# Patient Record
Sex: FEMALE | Race: WHITE | NOT HISPANIC OR LATINO | Employment: FULL TIME | URBAN - METROPOLITAN AREA
[De-identification: names, ages, dates, MRNs, and addresses within clinical notes are randomized per-mention and may not be internally consistent; named-entity substitution may affect disease eponyms.]

---

## 2017-03-10 ENCOUNTER — ALLSCRIPTS OFFICE VISIT (OUTPATIENT)
Dept: OTHER | Facility: OTHER | Age: 52
End: 2017-03-10

## 2017-04-28 ENCOUNTER — ALLSCRIPTS OFFICE VISIT (OUTPATIENT)
Dept: OTHER | Facility: OTHER | Age: 52
End: 2017-04-28

## 2017-04-28 DIAGNOSIS — E78.5 HYPERLIPIDEMIA: ICD-10-CM

## 2017-04-28 DIAGNOSIS — R53.83 OTHER FATIGUE: ICD-10-CM

## 2017-04-28 DIAGNOSIS — M75.21 BICIPITAL TENDINITIS OF RIGHT SHOULDER: ICD-10-CM

## 2017-04-28 DIAGNOSIS — Z12.31 ENCOUNTER FOR SCREENING MAMMOGRAM FOR MALIGNANT NEOPLASM OF BREAST: ICD-10-CM

## 2017-05-03 ENCOUNTER — LAB CONVERSION - ENCOUNTER (OUTPATIENT)
Dept: OTHER | Facility: OTHER | Age: 52
End: 2017-05-03

## 2017-05-03 LAB
A/G RATIO (HISTORICAL): 2.3 (CALC) (ref 1–2.5)
ALBUMIN SERPL BCP-MCNC: 4.4 G/DL (ref 3.6–5.1)
ALP SERPL-CCNC: 74 U/L (ref 33–130)
ALT SERPL W P-5'-P-CCNC: 12 U/L (ref 6–29)
AST SERPL W P-5'-P-CCNC: 12 U/L (ref 10–35)
BILIRUB SERPL-MCNC: 0.5 MG/DL (ref 0.2–1.2)
BUN SERPL-MCNC: 18 MG/DL (ref 7–25)
BUN/CREA RATIO (HISTORICAL): ABNORMAL (CALC) (ref 6–22)
CALCIUM SERPL-MCNC: 9.2 MG/DL (ref 8.6–10.4)
CHLORIDE SERPL-SCNC: 104 MMOL/L (ref 98–110)
CHOLEST SERPL-MCNC: 204 MG/DL (ref 125–200)
CHOLEST/HDLC SERPL: 3.5 (CALC)
CO2 SERPL-SCNC: 26 MMOL/L (ref 20–31)
CREAT SERPL-MCNC: 0.68 MG/DL (ref 0.5–1.05)
DEPRECATED RDW RBC AUTO: 13.9 % (ref 11–15)
EGFR AFRICAN AMERICAN (HISTORICAL): 117 ML/MIN/1.73M2
EGFR-AMERICAN CALC (HISTORICAL): 101 ML/MIN/1.73M2
GAMMA GLOBULIN (HISTORICAL): 1.9 G/DL (CALC) (ref 1.9–3.7)
GLUCOSE (HISTORICAL): 106 MG/DL (ref 65–99)
HCT VFR BLD AUTO: 41 % (ref 35–45)
HDLC SERPL-MCNC: 58 MG/DL
HGB BLD-MCNC: 13.8 G/DL (ref 11.7–15.5)
LDL CHOLESTEROL (HISTORICAL): 123 MG/DL (CALC)
MCH RBC QN AUTO: 31.3 PG (ref 27–33)
MCHC RBC AUTO-ENTMCNC: 33.5 G/DL (ref 32–36)
MCV RBC AUTO: 93.3 FL (ref 80–100)
NON-HDL-CHOL (CHOL-HDL) (HISTORICAL): 146 MG/DL (CALC)
PLATELET # BLD AUTO: 230 THOUSAND/UL (ref 140–400)
PMV BLD AUTO: 8.8 FL (ref 7.5–12.5)
POTASSIUM SERPL-SCNC: 4 MMOL/L (ref 3.5–5.3)
RBC # BLD AUTO: 4.39 MILLION/UL (ref 3.8–5.1)
SODIUM SERPL-SCNC: 138 MMOL/L (ref 135–146)
TOTAL PROTEIN (HISTORICAL): 6.3 G/DL (ref 6.1–8.1)
TRIGL SERPL-MCNC: 117 MG/DL
TSH SERPL DL<=0.05 MIU/L-ACNC: 2.35 MIU/L
WBC # BLD AUTO: 8 THOUSAND/UL (ref 3.8–10.8)

## 2017-05-15 ENCOUNTER — ALLSCRIPTS OFFICE VISIT (OUTPATIENT)
Dept: OTHER | Facility: OTHER | Age: 52
End: 2017-05-15

## 2017-05-22 ENCOUNTER — ALLSCRIPTS OFFICE VISIT (OUTPATIENT)
Dept: OTHER | Facility: OTHER | Age: 52
End: 2017-05-22

## 2017-08-28 ENCOUNTER — GENERIC CONVERSION - ENCOUNTER (OUTPATIENT)
Dept: OTHER | Facility: OTHER | Age: 52
End: 2017-08-28

## 2017-10-09 ENCOUNTER — ALLSCRIPTS OFFICE VISIT (OUTPATIENT)
Dept: OTHER | Facility: OTHER | Age: 52
End: 2017-10-09

## 2017-10-09 DIAGNOSIS — M75.21 BICIPITAL TENDINITIS OF RIGHT SHOULDER: ICD-10-CM

## 2017-10-10 NOTE — PROGRESS NOTES
Assessment  1  Biceps tendinitis of right shoulder (726 12) (M75 21)   2  Right rotator cuff tendinitis (726 10) (M75 81)    Plan  Biceps tendinitis of right shoulder    · *1 - [facility]  *  Status: Active  Requested for: [id number]  Care Summary provided  : Yes    Chief Complaint  1  Shoulder Pain    Discussion/Summary    Left shoulder pain suspected biceps tendinitis or rotator cuff tendinitisInjection given again into the subacromial space today versus the biceps tendon last visit  Referral to physical therapy  She has not been able to complete therapy so were going to try that again  If she continues to have pain despite having therapy and injection when she comes back in 6 weeks we will look at getting an MRI  History of Present Illness  Patient comes in with regards to her left shoulder  She was seen back in May  She was given a cortisone injection this help  She was unable to attend physical therapy due to family issues  She comes in today she reports the pain is in the from the shoulder its most exquisite when she crosses her arms and tries to reach for opposite side her hip  Her pain is like it was before  Active Problems  1  Acute bronchitis (466 0) (J20 9)   2  Arm skin lesion, right (709 9) (L98 9)   3  Arthralgia of hip, right (719 45) (M25 551)   4  Back Pain   5  Biceps tendinitis of right shoulder (726 12) (M75 21)   6  Biceps tendinitis on left (726 12) (M75 22)   7  Biliary dyskinesia (575 8) (K82 8)   8  Depression with anxiety (300 4) (F41 8)   9  Dermatitis (692 9) (L30 9)   10  Dyslipidemia (272 4) (E78 5)   11  Encounter for screening mammogram for breast cancer (V76 12) (Z12 31)   12  Fall (E888 9) (W19 XXXA)   13  Fatigue (780 79) (R53 83)   14  Gastritis (535 50) (K29 70)   15  Hot flashes, menopausal (627 2) (N95 1)   16  Impingement syndrome of left shoulder (726 2) (M75 42)   17  Insomnia (780 52) (G47 00)   18  Keloid of skin (701 4) (L91 0)   19  Laceration (879 8)   20  Lipoma (214 9) (D17 9)   21  Meralgia paresthetica of right side (355 1) (G57 11)   22  Neck pain (723 1) (M54 2)   23  Neck strain (847 0) (S16 1XXA)   24  Onychomycosis of toenail (110 1) (B35 1)   25  Pain in joint of right shoulder region (719 41) (M25 511)   26  Pharyngitis (462) (J02 9)   27  Rash (782 1) (R21)   28  Shoulder pain (719 41) (M25 519)   29  Sprain of neck (847 0) (S13 9XXA)   30  Status post fall (V15 88) (Z91 81)   31  Tendonitis of wrist, left (727 05) (M77 8)   32  TMJ arthralgia (524 62) (M26 629)   33  Upper respiratory infection (465 9) (J06 9)    Past Medical History   · History of headache (V13 89) (Z87 898)    Family History  Sister    · Family history of seizures (V19 8) (Z84 89)   · Family history of systemic lupus erythematosus (V19 4) (Z82 69)    Social History   · Current every day smoker (305 1) (F17 200)   · 3/4 PPD x25+ years   · Employed   · at [employer]   ·    · No alcohol use   · No illicit drug use    Current Meds   1  PARoxetine HCl ER 12 5 MG Oral Tablet Extended Release 24 Hour; Take 1 tablet daily; Therapy: 51LPB8420 to (Last Rx:15Qva2445)  Requested for: 07Jco9274 Ordered    Allergies  1  Iodine Crystals    Vitals   Recorded: 52NOM3711 12:10PM   Heart Rate 71   Systolic 733   Diastolic 79   Height 5 ft 2 in   Weight 134 lb 2 08 oz   BMI Calculated 24 53   BSA Calculated 1 61     Physical Exam  Shows deficits in flexion and abduction  She has pain with empty can she has pain with speed's and Neer's  She has pain over the biceps tendon  Cross-arm adduction also causes pain  Range of motion      Procedure    Procedure: Injection of the right  Indication:  inflammation  Potential complications include bleeding  Risk were discussed with the patient  Verbal consent was obtained prior to the procedure  Alcohol and betadine was used to prep the area  ethyl chloride spray was used as a topical anesthetic   Was used to inject 1 mL of 1% Lidocaine,-1 mL 0 25% Bupivacaine-and-1 mL of 6mg/mL betamethasone  A bandage was applied  the patient tolerated the procedure well  Complications: none        Signatures   Electronically signed by : Lauren Wetzel DO; Oct  9 2017 12:36PM EST                       (Author)

## 2018-01-09 NOTE — RESULT NOTES
Message   can you please let pt know that her throat culture was negative for strep?  thank you     Verified Results  (1) THROAT CULTURE (CULTURE, UPPER RESPIRATORY) [id number] 08:31PM Serafin Tiburcio     Test Name Result Flag Reference   CLINICAL REPORT (Report)     Test:        Throat culture  Specimen Type:   Throat  Specimen Date:   8/9/2016 8:31 PM  Result Date:    8/11/2016 9:08 AM  Result Status:   Final result  Resulting Lab:   [facility] [address] [place] [address]            Tel: [phone]      CULTURE                                       ------------------                                   Negative for beta-hemolytic Streptococcus

## 2018-01-13 VITALS
BODY MASS INDEX: 24.93 KG/M2 | DIASTOLIC BLOOD PRESSURE: 70 MMHG | TEMPERATURE: 96.2 F | WEIGHT: 135.5 LBS | HEIGHT: 62 IN | SYSTOLIC BLOOD PRESSURE: 110 MMHG | HEART RATE: 80 BPM | RESPIRATION RATE: 14 BRPM

## 2018-01-13 VITALS
BODY MASS INDEX: 24.68 KG/M2 | SYSTOLIC BLOOD PRESSURE: 123 MMHG | DIASTOLIC BLOOD PRESSURE: 79 MMHG | HEIGHT: 62 IN | WEIGHT: 134.13 LBS | HEART RATE: 71 BPM

## 2018-01-14 VITALS
BODY MASS INDEX: 25.12 KG/M2 | DIASTOLIC BLOOD PRESSURE: 68 MMHG | HEIGHT: 62 IN | TEMPERATURE: 98.1 F | WEIGHT: 136.5 LBS | SYSTOLIC BLOOD PRESSURE: 106 MMHG | RESPIRATION RATE: 14 BRPM | HEART RATE: 76 BPM

## 2018-01-14 VITALS
BODY MASS INDEX: 25.12 KG/M2 | DIASTOLIC BLOOD PRESSURE: 88 MMHG | WEIGHT: 136.5 LBS | HEART RATE: 109 BPM | HEIGHT: 62 IN | SYSTOLIC BLOOD PRESSURE: 142 MMHG
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
## 2018-01-22 VITALS
TEMPERATURE: 97.3 F | SYSTOLIC BLOOD PRESSURE: 112 MMHG | DIASTOLIC BLOOD PRESSURE: 66 MMHG | HEART RATE: 78 BPM | RESPIRATION RATE: 14 BRPM

## 2018-02-21 ENCOUNTER — OFFICE VISIT (OUTPATIENT)
Dept: FAMILY MEDICINE CLINIC | Facility: CLINIC | Age: 53
End: 2018-02-21
Payer: COMMERCIAL

## 2018-02-21 VITALS
SYSTOLIC BLOOD PRESSURE: 122 MMHG | WEIGHT: 139.2 LBS | HEIGHT: 62 IN | TEMPERATURE: 98.8 F | DIASTOLIC BLOOD PRESSURE: 78 MMHG | RESPIRATION RATE: 16 BRPM | HEART RATE: 88 BPM | BODY MASS INDEX: 25.62 KG/M2

## 2018-02-21 DIAGNOSIS — J11.1 INFLUENZA: Primary | ICD-10-CM

## 2018-02-21 PROCEDURE — 99213 OFFICE O/P EST LOW 20 MIN: CPT | Performed by: NURSE PRACTITIONER

## 2018-02-21 RX ORDER — OSELTAMIVIR PHOSPHATE 75 MG/1
75 CAPSULE ORAL EVERY 12 HOURS SCHEDULED
Qty: 10 CAPSULE | Refills: 0 | Status: SHIPPED | OUTPATIENT
Start: 2018-02-21 | End: 2018-02-26

## 2018-02-21 NOTE — LETTER
February 21, 2018     Patient: Yohannes Ruiz   YOB: 1965   Date of Visit: 2/21/2018       To Whom it May Concern:    Betzy Cheek is under my professional care  She was seen in my office on 2/21/2018  She may return to work on 2/23/2018  If you have any questions or concerns, please don't hesitate to call           Sincerely,          HARESH Pete        CC: No Recipients

## 2018-02-21 NOTE — PROGRESS NOTES
FAMILY PRACTICE OFFICE VISIT       NAME: Jeana Arredondo  AGE: 46 y o  SEX: female       : 1965        MRN: [id number]    DATE: 2018  TIME: 6:43 PM    Assessment and Plan     Problem List Items Addressed This Visit     None      Visit Diagnoses     Influenza    -  Primary    Relevant Medications    oseltamivir (TAMIFLU) 75 mg capsule            There are no Patient Instructions on file for this visit  1  Influenza  oseltamivir (TAMIFLU) 75 mg capsule     Patient presents today with symptoms concerning for influenza  No rapid flu tests available in office today  Will treat based on symptoms with Tamiflu 75 mg twice daily for 5 days  Reviewed usual course of illness and prevention of spread of illness  Note provided for work  Continue supportive care:  Rest, increase fluids, warm fluids, honey, and humidification  May use Tylenol or ibuprofen as needed  If symptoms worsen, or if symptoms do not improve over the next few days, instructed to call the office  Chief Complaint     Chief Complaint   Patient presents with    Generalized Body Aches     Pt is states she has had the sx's times two weeks     Nasal Congestion    Cough    Chills       History of Present Illness     Patient presents today with body aches, sneezing, nasal congestion, cough, and sore throat for the past 2 days  She does feel feverish with chills and hot flashes, but has not taken her temperature  Denies chest pain, shortness of breath, or chest tightness  She does feel she has chest congestion  Denies any nausea or vomiting  Coworkers have been sick as well  Cough   Associated symptoms include chills, a fever, myalgias, postnasal drip, rhinorrhea and a sore throat  Pertinent negatives include no chest pain, ear pain, headaches, rash, shortness of breath or wheezing  Review of Systems   Review of Systems   Constitutional: Positive for appetite change ( decreased), chills, fatigue and fever     HENT: Positive for congestion (decreased), postnasal drip, rhinorrhea, sinus pressure, sneezing and sore throat  Negative for ear pain  Respiratory: Positive for cough  Negative for chest tightness, shortness of breath and wheezing  Cardiovascular: Negative for chest pain  Gastrointestinal: Negative for diarrhea, nausea and vomiting  Musculoskeletal: Positive for myalgias  Skin: Negative for rash  Neurological: Negative for dizziness and headaches  Active Problem List     Patient Active Problem List   Diagnosis    Biliary dyskinesia    Depression with anxiety    Dyslipidemia    Insomnia    TMJ arthralgia       Past Medical History:  Past Medical History:   Diagnosis Date    Headache        Past Surgical History:  No past surgical history on file  Family History:  Family History   Problem Relation Age of Onset    Seizures Sister     Lupus Sister        Social History:  Social History     Social History    Marital status: /Civil Union     Spouse name: N/A    Number of children: N/A    Years of education: N/A     Occupational History     [employer]     Social History Main Topics    Smoking status: Current Every Day Smoker     Packs/day: 0 50     Years: 25 00    Smokeless tobacco: Never Used    Alcohol use No    Drug use: No    Sexual activity: Not on file     Other Topics Concern    Not on file     Social History Narrative    No narrative on file       I have reviewed the patient's medical history in detail; there are no changes to the history as noted in the electronic medical record      Objective     Vitals:    02/21/18 1401   BP: 122/78   BP Location: Left arm   Patient Position: Sitting   Cuff Size: Adult   Pulse: 88   Resp: 16   Temp: 98 8 °F (37 1 °C)   TempSrc: Tympanic   Weight: 63 1 kg (139 lb 3 2 oz)   Height: 5' 2" (1 575 m)     Wt Readings from Last 3 Encounters:   02/21/18 63 1 kg (139 lb 3 2 oz)   10/09/17 60 8 kg (134 lb 2 1 oz)   05/22/17 61 5 kg (135 lb 8 oz) Body mass index is 25 46 kg/m²  Physical Exam   Constitutional: She appears well-developed and well-nourished  HENT:   Head: Normocephalic and atraumatic  Right Ear: Tympanic membrane and ear canal normal    Left Ear: Tympanic membrane and ear canal normal    Nose: Mucosal edema and rhinorrhea present  Mouth/Throat: Posterior oropharyngeal erythema present  No oropharyngeal exudate  Posterior oropharyngeal edema: Postnasal drip  Eyes: Conjunctivae are normal    Neck: Normal range of motion  Neck supple  Cardiovascular: Normal rate, regular rhythm and normal heart sounds  No murmur heard  Pulmonary/Chest: Effort normal and breath sounds normal    Musculoskeletal: She exhibits no edema  Lymphadenopathy:     She has cervical adenopathy (Bilateral submandibular adenopathy, left greater than right )  Psychiatric: She has a normal mood and affect  Nursing note and vitals reviewed  ALLERGIES:  Allergies   Allergen Reactions    Iodine Edema     Reaction Date: [id number];        Current Medications     Current Outpatient Prescriptions   Medication Sig Dispense Refill    PARoxetine (PAXIL-CR) 12 5 mg 24 hr tablet Take 1 tablet by mouth daily      oseltamivir (TAMIFLU) 75 mg capsule Take 1 capsule (75 mg total) by mouth every 12 (twelve) hours for 5 days 10 capsule 0     No current facility-administered medications for this visit  Health Maintenance   There are no preventive care reminders to display for this patient  There is no immunization history on file for this patient      Sonia Nieto

## 2018-03-29 DIAGNOSIS — F32.A DEPRESSION, UNSPECIFIED DEPRESSION TYPE: Primary | ICD-10-CM

## 2018-03-30 RX ORDER — PAROXETINE HYDROCHLORIDE 12.5 MG/1
12.5 TABLET, FILM COATED, EXTENDED RELEASE ORAL DAILY
Qty: 90 TABLET | Refills: 1 | Status: SHIPPED | OUTPATIENT
Start: 2018-03-30 | End: 2018-09-20 | Stop reason: SDUPTHER

## 2018-06-27 ENCOUNTER — OFFICE VISIT (OUTPATIENT)
Dept: FAMILY MEDICINE CLINIC | Facility: CLINIC | Age: 53
End: 2018-06-27
Payer: COMMERCIAL

## 2018-06-27 VITALS
TEMPERATURE: 96.9 F | RESPIRATION RATE: 20 BRPM | BODY MASS INDEX: 24.69 KG/M2 | HEIGHT: 62 IN | SYSTOLIC BLOOD PRESSURE: 110 MMHG | DIASTOLIC BLOOD PRESSURE: 78 MMHG | WEIGHT: 134.2 LBS | HEART RATE: 80 BPM

## 2018-06-27 DIAGNOSIS — J06.9 UPPER RESPIRATORY TRACT INFECTION, UNSPECIFIED TYPE: Primary | ICD-10-CM

## 2018-06-27 PROCEDURE — 99213 OFFICE O/P EST LOW 20 MIN: CPT | Performed by: NURSE PRACTITIONER

## 2018-06-27 RX ORDER — AZITHROMYCIN 250 MG/1
TABLET, FILM COATED ORAL
Qty: 6 TABLET | Refills: 0 | Status: SHIPPED | OUTPATIENT
Start: 2018-06-27 | End: 2018-06-29

## 2018-06-27 NOTE — PROGRESS NOTES
FAMILY PRACTICE OFFICE VISIT       NAME: Meli Oliva  AGE: 48 y o  SEX: female       : 1965        MRN: [id number]    DATE: 2018    Assessment and Plan     Problem List Items Addressed This Visit     None      Visit Diagnoses     Upper respiratory tract infection, unspecified type    -  Primary    Relevant Medications    azithromycin (ZITHROMAX) 250 mg tablet                1  Upper respiratory tract infection, unspecified type  azithromycin (ZITHROMAX) 250 mg tablet     Patient presents today with symptoms consistent with a URI  She is a current smoker, therefore will treat with a z-jerry  Take with food as directed  Continue supportive care: rest, increase fluids, throat lozenges, & warm salt water gargles  Mucinex and Tylenol as needed  If symptoms worsen, or if symptoms do not improve over the next few days, instructed to call the office  Chief Complaint     Chief Complaint   Patient presents with    Chills    Generalized Body Aches    Cough     w/ green mucous     Sore Throat       History of Present Illness     Patient presents today for sore throat, productive cough, body aches, and chills for the past 2 days  Denies fevers, shortness of breath, or chest pain  Her  is sick with similar symptoms  She is a current every day smoker  Review of Systems   Review of Systems   Constitutional: Positive for chills and fatigue  Negative for fever  HENT: Positive for postnasal drip, rhinorrhea and sore throat  Negative for congestion, ear pain and sinus pressure  Respiratory: Positive for cough and chest tightness  Negative for shortness of breath and wheezing  Cardiovascular: Negative for chest pain, palpitations and leg swelling  Gastrointestinal: Negative for diarrhea, nausea and vomiting  Musculoskeletal: Positive for myalgias  Neurological: Negative for dizziness and headaches         Active Problem List     Patient Active Problem List   Diagnosis    Biliary dyskinesia    Depression with anxiety    Dyslipidemia    Insomnia    TMJ arthralgia       Past Medical History:  Past Medical History:   Diagnosis Date    Headache        Past Surgical History:  History reviewed  No pertinent surgical history  Family History:  Family History   Problem Relation Age of Onset    Seizures Sister     Lupus Sister        Social History:  Social History     Social History    Marital status: /Civil Union     Spouse name: N/A    Number of children: N/A    Years of education: N/A     Occupational History     [employer]     Social History Main Topics    Smoking status: Current Every Day Smoker     Packs/day: 0 50     Years: 25 00    Smokeless tobacco: Never Used    Alcohol use No    Drug use: No    Sexual activity: Not on file     Other Topics Concern    Not on file     Social History Narrative    No narrative on file       I have reviewed the patient's medical history in detail; there are no changes to the history as noted in the electronic medical record  Objective     Vitals:    06/27/18 1031   BP: 110/78   BP Location: Left arm   Patient Position: Sitting   Cuff Size: Adult   Pulse: 80   Resp: 20   Temp: (!) 96 9 °F (36 1 °C)   TempSrc: Tympanic   Weight: 60 9 kg (134 lb 3 2 oz)   Height: 5' 2" (1 575 m)     Wt Readings from Last 3 Encounters:   06/27/18 60 9 kg (134 lb 3 2 oz)   02/21/18 63 1 kg (139 lb 3 2 oz)   10/09/17 60 8 kg (134 lb 2 1 oz)     Body mass index is 24 55 kg/m²  Physical Exam   Constitutional: She appears well-developed and well-nourished  HENT:   Head: Normocephalic and atraumatic  Right Ear: Tympanic membrane and ear canal normal    Left Ear: Tympanic membrane and ear canal normal    Nose: Rhinorrhea present  Mouth/Throat: Posterior oropharyngeal erythema (post nasal drip) present  No oropharyngeal exudate  Eyes: Conjunctivae are normal    Neck: Normal range of motion  Neck supple     Cardiovascular: Normal rate, regular rhythm and normal heart sounds  No murmur heard  Pulmonary/Chest: Effort normal and breath sounds normal    Harsh moist cough   Musculoskeletal: She exhibits no edema  Lymphadenopathy:     She has no cervical adenopathy  Psychiatric: She has a normal mood and affect  Nursing note and vitals reviewed  ALLERGIES:  Allergies   Allergen Reactions    Iodine Edema and Swelling     Reaction Date: [id number];        Current Medications     Current Outpatient Prescriptions   Medication Sig Dispense Refill    PARoxetine (PAXIL-CR) 12 5 mg 24 hr tablet Take 1 tablet (12 5 mg total) by mouth daily 90 tablet 1    azithromycin (ZITHROMAX) 250 mg tablet Take 2 tablets on day 1 and then take 1 tablet on days 2-5  6 tablet 0     No current facility-administered medications for this visit  Health Maintenance     Health Maintenance   Topic Date Due    HIV SCREENING  1965    Hepatitis C Screening  1965    CRC Screening: Colonoscopy  1965    PNEUMOCOCCAL POLYSACCHARIDE VACCINE AGE 2-64 HIGH RISK  03/09/1967    DTaP,Tdap,and Td Vaccines (1 - Tdap) 03/09/1986    INFLUENZA VACCINE  09/01/2018       There is no immunization history on file for this patient      Sonia Rodriguez Confer

## 2018-06-29 ENCOUNTER — TELEPHONE (OUTPATIENT)
Dept: FAMILY MEDICINE CLINIC | Facility: CLINIC | Age: 53
End: 2018-06-29

## 2018-06-29 ENCOUNTER — OFFICE VISIT (OUTPATIENT)
Dept: FAMILY MEDICINE CLINIC | Facility: CLINIC | Age: 53
End: 2018-06-29
Payer: COMMERCIAL

## 2018-06-29 VITALS
RESPIRATION RATE: 20 BRPM | WEIGHT: 134.4 LBS | HEART RATE: 72 BPM | TEMPERATURE: 96.9 F | DIASTOLIC BLOOD PRESSURE: 70 MMHG | SYSTOLIC BLOOD PRESSURE: 118 MMHG | HEIGHT: 62 IN | BODY MASS INDEX: 24.73 KG/M2

## 2018-06-29 DIAGNOSIS — J01.90 ACUTE NON-RECURRENT SINUSITIS, UNSPECIFIED LOCATION: Primary | ICD-10-CM

## 2018-06-29 PROCEDURE — 99213 OFFICE O/P EST LOW 20 MIN: CPT | Performed by: NURSE PRACTITIONER

## 2018-06-29 PROCEDURE — 3008F BODY MASS INDEX DOCD: CPT | Performed by: NURSE PRACTITIONER

## 2018-06-29 RX ORDER — LEVOFLOXACIN 500 MG/1
500 TABLET, FILM COATED ORAL EVERY 24 HOURS
Qty: 7 TABLET | Refills: 0 | Status: SHIPPED | OUTPATIENT
Start: 2018-06-29 | End: 2018-07-06

## 2018-06-29 RX ORDER — GUAIFENESIN AND CODEINE PHOSPHATE 100; 10 MG/5ML; MG/5ML
5 SOLUTION ORAL 3 TIMES DAILY PRN
Qty: 120 ML | Refills: 0 | Status: SHIPPED | OUTPATIENT
Start: 2018-06-29 | End: 2018-10-18

## 2018-06-29 NOTE — PROGRESS NOTES
FAMILY PRACTICE OFFICE VISIT       NAME: Jose So  AGE: 48 y o  SEX: female       : 1965        MRN: [id number]    DATE: 2018    Assessment and Plan     Problem List Items Addressed This Visit     None      Visit Diagnoses     Acute non-recurrent sinusitis, unspecified location    -  Primary    Relevant Medications    levofloxacin (LEVAQUIN) 500 mg tablet    guaifenesin-codeine (GUAIFENESIN AC) 100-10 MG/5ML liquid            1  Acute non-recurrent sinusitis, unspecified location  Will stop Azithromycin and start Levofloxacin 500 mg daily for 7 days  Cheratussin as needed for cough  Reviewed sedating side effects of this medication  Avoid driving while taking this medication  Continue to rest and increase fluids  If symptoms worsen, or persist, instructed to call       levofloxacin (LEVAQUIN) 500 mg tablet    guaifenesin-codeine (GUAIFENESIN AC) 100-10 MG/5ML liquid             Chief Complaint     Chief Complaint   Patient presents with    Headache    Neck Pain    Cough       History of Present Illness     Patient presents today with cold symptoms that are worsening  She was seen in office 2 days ago  She has been taking Azithromycin as prescribed, but it has caused nausea, and diarrhea  States her cough is worsening, chest hurts worse with cough  She has a sore throat, headache, neck ache, and clogged ears  Body aches have improved  No fever  Current smoker  Review of Systems   Review of Systems   Constitutional: Positive for fatigue  Negative for chills and fever  HENT: Positive for congestion, ear pain, postnasal drip, sinus pressure and sore throat  Respiratory: Positive for cough and chest tightness  Negative for shortness of breath and wheezing  Cardiovascular: Positive for chest pain (with cough)  Negative for palpitations and leg swelling  Gastrointestinal: Positive for diarrhea and nausea  Negative for vomiting  Musculoskeletal: Negative for myalgias  Neurological: Positive for headaches  Negative for dizziness  Active Problem List     Patient Active Problem List   Diagnosis    Biliary dyskinesia    Depression with anxiety    Dyslipidemia    Insomnia    TMJ arthralgia       Past Medical History:  Past Medical History:   Diagnosis Date    Headache        Past Surgical History:  History reviewed  No pertinent surgical history  Family History:  Family History   Problem Relation Age of Onset    Seizures Sister     Lupus Sister        Social History:  Social History     Social History    Marital status: /Civil Union     Spouse name: N/A    Number of children: N/A    Years of education: N/A     Occupational History     [employer]     Social History Main Topics    Smoking status: Current Every Day Smoker     Packs/day: 0 50     Years: 25 00    Smokeless tobacco: Never Used    Alcohol use No    Drug use: No    Sexual activity: Not on file     Other Topics Concern    Not on file     Social History Narrative    No narrative on file       I have reviewed the patient's medical history in detail; there are no changes to the history as noted in the electronic medical record  Objective     Vitals:    06/29/18 1503   BP: 118/70   BP Location: Left arm   Patient Position: Sitting   Cuff Size: Adult   Pulse: 72   Resp: 20   Temp: (!) 96 9 °F (36 1 °C)   TempSrc: Tympanic   Weight: 61 kg (134 lb 6 4 oz)   Height: 5' 2" (1 575 m)     Wt Readings from Last 3 Encounters:   06/29/18 61 kg (134 lb 6 4 oz)   06/27/18 60 9 kg (134 lb 3 2 oz)   02/21/18 63 1 kg (139 lb 3 2 oz)     Body mass index is 24 58 kg/m²  Physical Exam   Constitutional: She appears well-developed and well-nourished  No distress  HENT:   Head: Normocephalic and atraumatic  Right Ear: Tympanic membrane and ear canal normal    Left Ear: Tympanic membrane and ear canal normal    Nose: Mucosal edema present  Mouth/Throat: Posterior oropharyngeal erythema present     Eyes: Conjunctivae are normal    Neck: Normal range of motion  Neck supple  Cardiovascular: Normal rate, regular rhythm and normal heart sounds  Pulmonary/Chest: Effort normal and breath sounds normal    Musculoskeletal: She exhibits no edema  Lymphadenopathy:     She has cervical adenopathy  Psychiatric: She has a normal mood and affect  Nursing note and vitals reviewed  ALLERGIES:  Allergies   Allergen Reactions    Azithromycin Nausea Only    Iodine Edema and Swelling     Reaction Date: [id number];        Current Medications     Current Outpatient Prescriptions   Medication Sig Dispense Refill    guaifenesin-codeine (GUAIFENESIN AC) 100-10 MG/5ML liquid Take 5 mL by mouth 3 (three) times a day as needed for cough 120 mL 0    levofloxacin (LEVAQUIN) 500 mg tablet Take 1 tablet (500 mg total) by mouth every 24 hours for 7 days 7 tablet 0    PARoxetine (PAXIL-CR) 12 5 mg 24 hr tablet Take 1 tablet (12 5 mg total) by mouth daily 90 tablet 1     No current facility-administered medications for this visit  Health Maintenance     Health Maintenance   Topic Date Due    HIV SCREENING  1965    Hepatitis C Screening  1965    CRC Screening: Colonoscopy  1965    PNEUMOCOCCAL POLYSACCHARIDE VACCINE AGE 2-64 HIGH RISK  03/09/1967    DTaP,Tdap,and Td Vaccines (1 - Tdap) 03/09/1986    INFLUENZA VACCINE  09/01/2018       There is no immunization history on file for this patient      Sonia Cavazos

## 2018-07-02 ENCOUNTER — TELEPHONE (OUTPATIENT)
Dept: FAMILY MEDICINE CLINIC | Facility: CLINIC | Age: 53
End: 2018-07-02

## 2018-07-02 DIAGNOSIS — R05.9 COUGH: Primary | ICD-10-CM

## 2018-07-02 RX ORDER — ALBUTEROL SULFATE 90 UG/1
2 AEROSOL, METERED RESPIRATORY (INHALATION) EVERY 4 HOURS PRN
Qty: 1 INHALER | Refills: 0 | Status: SHIPPED | OUTPATIENT
Start: 2018-07-02 | End: 2018-10-18

## 2018-07-02 NOTE — TELEPHONE ENCOUNTER
Patient called stating that she was here on Friday but her cough has not gotten any better  She still has mucous in chest also  Is there something else that can be prescribed or what should she do at this time?   Please call and advise

## 2018-07-02 NOTE — TELEPHONE ENCOUNTER
I have sent an inhaler to the pharmacy for her  Please have her call if symptoms worsen, or if they are not improving by the end of the week

## 2018-07-02 NOTE — TELEPHONE ENCOUNTER
Please contact patient  I did not see her  We can either  forward this task to Sonia for advise OR I  can see her tomorrow    Thank you

## 2018-07-02 NOTE — TELEPHONE ENCOUNTER
Spoke with pt, she does not have any fever  Pt states her,"chest feels heavy with mucous"  Moist productive cough continues

## 2018-09-20 DIAGNOSIS — F32.A DEPRESSION, UNSPECIFIED DEPRESSION TYPE: ICD-10-CM

## 2018-09-21 RX ORDER — PAROXETINE HYDROCHLORIDE 12.5 MG/1
TABLET, FILM COATED, EXTENDED RELEASE ORAL
Qty: 90 TABLET | Refills: 1 | Status: SHIPPED | OUTPATIENT
Start: 2018-09-21 | End: 2019-03-13 | Stop reason: SDUPTHER

## 2018-10-01 ENCOUNTER — OFFICE VISIT (OUTPATIENT)
Dept: FAMILY MEDICINE CLINIC | Facility: CLINIC | Age: 53
End: 2018-10-01
Payer: COMMERCIAL

## 2018-10-01 VITALS
DIASTOLIC BLOOD PRESSURE: 78 MMHG | HEIGHT: 62 IN | WEIGHT: 132 LBS | BODY MASS INDEX: 24.29 KG/M2 | HEART RATE: 64 BPM | TEMPERATURE: 97 F | SYSTOLIC BLOOD PRESSURE: 118 MMHG | RESPIRATION RATE: 16 BRPM

## 2018-10-01 DIAGNOSIS — R53.83 FATIGUE, UNSPECIFIED TYPE: ICD-10-CM

## 2018-10-01 DIAGNOSIS — L42 PITYRIASIS ROSEA: Primary | ICD-10-CM

## 2018-10-01 DIAGNOSIS — E55.9 VITAMIN D DEFICIENCY: ICD-10-CM

## 2018-10-01 DIAGNOSIS — E78.5 DYSLIPIDEMIA: ICD-10-CM

## 2018-10-01 PROCEDURE — 99213 OFFICE O/P EST LOW 20 MIN: CPT | Performed by: FAMILY MEDICINE

## 2018-10-01 RX ORDER — TRIAMCINOLONE ACETONIDE 1 MG/G
CREAM TOPICAL 2 TIMES DAILY
Qty: 80 G | Refills: 0 | Status: SHIPPED | OUTPATIENT
Start: 2018-10-01 | End: 2020-03-02 | Stop reason: ALTCHOICE

## 2018-10-01 NOTE — PROGRESS NOTES
FAMILY PRACTICE OFFICE VISIT       NAME: Mónica Cuellar  AGE: 48 y o  SEX: female       : 1965        MRN: [id number]        Assessment and Plan     Problem List Items Addressed This Visit     Dyslipidemia    Relevant Orders    Lipid panel      Other Visit Diagnoses     Pityriasis rosea    -  Primary    Relevant Medications    triamcinolone (KENALOG) 0 1 % cream    Fatigue, unspecified type        Relevant Orders    CBC    Comprehensive metabolic panel    TSH, 3rd generation    Vitamin D deficiency        Relevant Orders    Vitamin D 25 hydroxy       Patient presents for evaluation of non pruritic erythematous rash within past 10 days  No fever, no systemic symptoms, no new medications or personal care items  Her exam is consistent with pityriasis rosea  I advised patient to start Claritin 10 mg once a day and use topical triamcinolone cream   Patient will follow up with me for routine physical in a few weeks  Will reassess rash at that time  If symptoms will persist-will consider low dose of oral corticosteroids  History of dyslipidemia and fatigue  Patient will proceed with complete blood work prior to her next office visit  Patient Instructions    CLARITIN  10  Mg daily  ( LORATADINE)      Pityriasis rosea   WHAT YOU NEED TO KNOW:   What is Pityriasis rosea? Pityriasis rosea is a skin disorder that causes a scaly rash  The cause of Pityriasis rosea is not known  It usually goes away on its own in 2 to 12 weeks  Pityriasis rosea most often occurs in people who are 8to 28years old and during pregnancy  What are the signs and symptoms of Pityriasis rosea? The rash first appears as a single pink patch on the chest or back  In people who have dark skin, the color may be violet or dark gray  Within 90 days of the first patch, the rash spreads to the rest of the torso  The rash can also spread to the neck, arms, and legs  Some people with Pityriasis rosea have mild to moderate itching     How is Pityriasis rosea diagnosed? Your healthcare provider will examine your rash and ask about your symptoms  He may take a sample of your skin to check for a fungal infection  How is Pityriasis rosea treated? Your healthcare provider may prescribe antihistamines or steroids to help reduce itching  They may be given as a pill or cream  Severe cases may be treated with ultraviolet light therapy  How can I manage my symptoms? Heat may irritate your skin and cause itching  Avoid hot showers and physical activity that may make your skin too warm  When should I contact my healthcare provider? · Your rash does not improve within 10 weeks  · Your itching becomes worse  · Your rash becomes more red and you have fever  CARE AGREEMENT:   You have the right to help plan your care  Learn about your health condition and how it may be treated  Discuss treatment options with your caregivers to decide what care you want to receive  You always have the right to refuse treatment  The above information is an  only  It is not intended as medical advice for individual conditions or treatments  Talk to your doctor, nurse or pharmacist before following any medical regimen to see if it is safe and effective for you  © 2017 [facility] Information is for End User's use only and may not be sold, redistributed or otherwise used for commercial purposes  All illustrations and images included in CareNotes® are the copyrighted property of [employer] , [employer]  or [employer]  Chief Complaint     Chief Complaint   Patient presents with    Rash     Pt is here for rash on abdominal area times 10 days        History of Present Illness     Painless nonpruritic rash abdomen and groin that has appeared 10 days ago  Patient denies any new personal care items, no new medications  No recent travel  She denies any difficulty breathing or swallowing    Multiple erythematous patches right and left side of her mid/lower abdomen, spreading to the groin  Patient did not try any medications for this symptoms  She otherwise has been feeling well  No fever, fatigue or any new symptoms  Rash   This is a new problem  The current episode started 1 to 4 weeks ago  The problem is unchanged  The affected locations include the abdomen and groin  The rash is characterized by redness  She was exposed to nothing  Pertinent negatives include no anorexia, facial edema, fatigue, fever, joint pain, nail changes, shortness of breath or sore throat  Past treatments include nothing  Review of Systems   Review of Systems   Constitutional: Negative  Negative for fatigue and fever  HENT: Negative  Negative for sore throat  Respiratory: Negative  Negative for shortness of breath  Cardiovascular: Negative  Gastrointestinal: Negative  Negative for anorexia  Musculoskeletal: Negative  Negative for joint pain  Skin: Positive for rash  Negative for nail changes  Neurological: Negative  Active Problem List     Patient Active Problem List   Diagnosis    Biliary dyskinesia    Depression with anxiety    Dyslipidemia    Insomnia    TMJ arthralgia       Past Medical History:  Past Medical History:   Diagnosis Date    Headache        Past Surgical History:  History reviewed  No pertinent surgical history      Family History:  Family History   Problem Relation Age of Onset    Seizures Sister     Lupus Sister        Social History:  Social History     Social History    Marital status: /Civil Union     Spouse name: N/A    Number of children: N/A    Years of education: N/A     Occupational History     [employer]     Social History Main Topics    Smoking status: Current Every Day Smoker     Packs/day: 0 50     Years: 25 00    Smokeless tobacco: Never Used    Alcohol use No    Drug use: No    Sexual activity: Not on file     Other Topics Concern    Not on file     Social History Narrative    No narrative on file       Objective     Vitals:    10/01/18 1703   BP: 118/78   Pulse: 64   Resp: 16   Temp: (!) 97 °F (36 1 °C)     Wt Readings from Last 3 Encounters:   10/01/18 59 9 kg (132 lb)   06/29/18 61 kg (134 lb 6 4 oz)   06/27/18 60 9 kg (134 lb 3 2 oz)       Physical Exam   Constitutional: She is oriented to person, place, and time  She appears well-developed and well-nourished  Neurological: She is alert and oriented to person, place, and time  No cranial nerve deficit  Skin:   Erythematous papular oval shaped rash mid lower abdomen and groin, bilateral, symmetrical, nonpruritic, non scaly   Psychiatric: She has a normal mood and affect  Her behavior is normal    Nursing note and vitals reviewed        Pertinent Laboratory/Diagnostic Studies:  Lab Results   Component Value Date    BUN 18 05/02/2017    CREATININE 0 68 05/02/2017    CALCIUM 9 2 05/02/2017     05/02/2017    K 4 0 05/02/2017    CO2 26 05/02/2017     05/02/2017     Lab Results   Component Value Date    ALT 12 05/02/2017    AST 12 05/02/2017    ALKPHOS 74 05/02/2017    BILITOT 0 5 05/02/2017       Lab Results   Component Value Date    WBC 8 0 05/02/2017    HGB 13 8 05/02/2017    HCT 41 0 05/02/2017    MCV 93 3 05/02/2017     05/02/2017       No results found for: TSH    Lab Results   Component Value Date    CHOL 204 (H) 05/02/2017     Lab Results   Component Value Date    TRIG 117 05/02/2017     Lab Results   Component Value Date    HDL 58 05/02/2017     No results found for: LDLCALC  No results found for: HGBA1C    Results for orders placed or performed in visit on 05/03/17   TSH, 3RD GENERATION (HISTORICAL)   Result Value Ref Range    TSH 3RD GENERATON 2 35 mIU/L   CBC AND PLATELET (HISTORICAL)   Result Value Ref Range    WBC 8 0 3 8 - 10 8 Thousand/uL    RBC 4 39 3 80 - 5 10 Million/uL    Hemoglobin 13 8 11 7 - 15 5 g/dL    Hematocrit 41 0 35 0 - 45 0 %    MCV 93 3 80 0 - 100 0 fL    MCH 31 3 27 0 - 33 0 pg    MCHC 33 5 32 0 - 36 0 g/dL    RDW 13 9 11 0 - 15 0 %    Platelets 468 197 - 190 Thousand/uL    MPV 8 8 7 5 - 12 5 fL   LIPID PANEL WITH DIRECT LDL REFLEX (HISTORICAL)   Result Value Ref Range    Cholesterol 204 (H) 125 - 200 mg/dL    HDL 58 > OR = 46 mg/dL    Triglycerides 117 <150 mg/dL    LDL CHOLESTEROL 123 <130 mg/dL (calc)    Chol/HDL Ratio 3 5 < OR = 5 0 (calc)    NON-HDL-CHOL (CHOL-HDL) 146 mg/dL (calc)       Orders Placed This Encounter   Procedures    CBC    Lipid panel    Comprehensive metabolic panel    TSH, 3rd generation    Vitamin D 25 hydroxy       ALLERGIES:  Allergies   Allergen Reactions    Azithromycin Nausea Only    Iodine Edema and Swelling     Reaction Date: [id number];        Current Medications     Current Outpatient Prescriptions   Medication Sig Dispense Refill    albuterol (VENTOLIN HFA) 90 mcg/act inhaler Inhale 2 puffs every 4 (four) hours as needed for wheezing or shortness of breath (cough or chest tightness) 1 Inhaler 0    guaifenesin-codeine (GUAIFENESIN AC) 100-10 MG/5ML liquid Take 5 mL by mouth 3 (three) times a day as needed for cough 120 mL 0    PARoxetine (PAXIL-CR) 12 5 mg 24 hr tablet TAKE 1 TABLET DAILY  90 tablet 1    triamcinolone (KENALOG) 0 1 % cream Apply topically 2 (two) times a day 80 g 0     No current facility-administered medications for this visit  Health Maintenance     Health Maintenance   Topic Date Due    CRC Screening: Colonoscopy  1965    Pneumococcal PPSV23 Medium Risk Adult (1 of 1 - PPSV23) 03/09/1984    DTaP,Tdap,and Td Vaccines (1 - Tdap) 03/09/1986    INFLUENZA VACCINE  09/01/2018       There is no immunization history on file for this patient      Ngozi Gay MD

## 2018-10-01 NOTE — PATIENT INSTRUCTIONS
CLARITIN  10  Mg daily  ( LORATADINE)      Pityriasis rosea   WHAT YOU NEED TO KNOW:   What is Pityriasis rosea? Pityriasis rosea is a skin disorder that causes a scaly rash  The cause of Pityriasis rosea is not known  It usually goes away on its own in 2 to 12 weeks  Pityriasis rosea most often occurs in people who are 8to 28years old and during pregnancy  What are the signs and symptoms of Pityriasis rosea? The rash first appears as a single pink patch on the chest or back  In people who have dark skin, the color may be violet or dark gray  Within 90 days of the first patch, the rash spreads to the rest of the torso  The rash can also spread to the neck, arms, and legs  Some people with Pityriasis rosea have mild to moderate itching  How is Pityriasis rosea diagnosed? Your healthcare provider will examine your rash and ask about your symptoms  He may take a sample of your skin to check for a fungal infection  How is Pityriasis rosea treated? Your healthcare provider may prescribe antihistamines or steroids to help reduce itching  They may be given as a pill or cream  Severe cases may be treated with ultraviolet light therapy  How can I manage my symptoms? Heat may irritate your skin and cause itching  Avoid hot showers and physical activity that may make your skin too warm  When should I contact my healthcare provider? · Your rash does not improve within 10 weeks  · Your itching becomes worse  · Your rash becomes more red and you have fever  CARE AGREEMENT:   You have the right to help plan your care  Learn about your health condition and how it may be treated  Discuss treatment options with your caregivers to decide what care you want to receive  You always have the right to refuse treatment  The above information is an  only  It is not intended as medical advice for individual conditions or treatments   Talk to your doctor, nurse or pharmacist before following any medical regimen to see if it is safe and effective for you  © 2017 [address] [employer] Information is for End User's use only and may not be sold, redistributed or otherwise used for commercial purposes  All illustrations and images included in CareNotes® are the copyrighted property of [employer]  or [employer]

## 2018-10-18 ENCOUNTER — OFFICE VISIT (OUTPATIENT)
Dept: FAMILY MEDICINE CLINIC | Facility: CLINIC | Age: 53
End: 2018-10-18
Payer: COMMERCIAL

## 2018-10-18 VITALS
BODY MASS INDEX: 24.73 KG/M2 | HEART RATE: 68 BPM | RESPIRATION RATE: 16 BRPM | DIASTOLIC BLOOD PRESSURE: 70 MMHG | WEIGHT: 131 LBS | HEIGHT: 61 IN | SYSTOLIC BLOOD PRESSURE: 120 MMHG | TEMPERATURE: 98.7 F

## 2018-10-18 DIAGNOSIS — G89.29 CHRONIC RIGHT-SIDED LOW BACK PAIN WITH RIGHT-SIDED SCIATICA: ICD-10-CM

## 2018-10-18 DIAGNOSIS — M54.41 CHRONIC RIGHT-SIDED LOW BACK PAIN WITH RIGHT-SIDED SCIATICA: ICD-10-CM

## 2018-10-18 DIAGNOSIS — E55.9 VITAMIN D DEFICIENCY: ICD-10-CM

## 2018-10-18 DIAGNOSIS — F41.8 DEPRESSION WITH ANXIETY: ICD-10-CM

## 2018-10-18 DIAGNOSIS — E78.5 DYSLIPIDEMIA: ICD-10-CM

## 2018-10-18 DIAGNOSIS — Z28.21 REFUSED INFLUENZA VACCINE: ICD-10-CM

## 2018-10-18 DIAGNOSIS — B35.3 TINEA PEDIS OF BOTH FEET: ICD-10-CM

## 2018-10-18 DIAGNOSIS — Z12.39 SCREENING FOR BREAST CANCER: ICD-10-CM

## 2018-10-18 DIAGNOSIS — Z00.00 WELL ADULT EXAM: Primary | ICD-10-CM

## 2018-10-18 PROCEDURE — 99396 PREV VISIT EST AGE 40-64: CPT | Performed by: FAMILY MEDICINE

## 2018-10-18 RX ORDER — CLOTRIMAZOLE AND BETAMETHASONE DIPROPIONATE 10; .64 MG/G; MG/G
CREAM TOPICAL 2 TIMES DAILY
Qty: 45 G | Refills: 0 | Status: SHIPPED | OUTPATIENT
Start: 2018-10-18 | End: 2020-03-02 | Stop reason: ALTCHOICE

## 2018-10-18 RX ORDER — CLOTRIMAZOLE AND BETAMETHASONE DIPROPIONATE 10; .64 MG/G; MG/G
CREAM TOPICAL 2 TIMES DAILY
Qty: 45 G | Refills: 1 | Status: SHIPPED | OUTPATIENT
Start: 2018-10-18 | End: 2018-10-18 | Stop reason: SDUPTHER

## 2018-10-18 NOTE — PATIENT INSTRUCTIONS
[facility] [address]  -  Fish oil supplement,  Around 3444-8025 mg daily    Vitamin D3 capsules  Daily  Dose of  2000 IU daily         Trigger Finger   WHAT YOU NEED TO KNOW:   What is trigger finger? Trigger finger is when your finger or thumb gets stuck in a bent position and snaps, pops, or clicks when you straighten it  What causes trigger finger? Trigger finger is caused by narrowing of the tendon sheath  The tendon sheath is the tunnel that your tendon slides through when you bend or straighten your finger  A tendon is strong tissue that attaches muscle to bone  When the tendon sheath narrows, the tendon does not slide as easily  Certain medical conditions, such as diabetes or arthritis, may increase your risk of trigger finger  What are the signs and symptoms of trigger finger? · Clicking, snapping, or popping noise when you move your finger    · Finger stuck in a bent position    · Pain    · Swelling and stiffness    · A bump at the base of your finger  How is trigger finger diagnosed? Your healthcare provider will ask about your health history and examine your finger  He will ask about your signs and symptoms and have you bend and straighten your finger  How is trigger finger treated? · Medicines:      ¨ NSAIDs:  These medicines decrease pain and swelling  NSAIDs can be bought without a doctor's order  Ask which medicine is right for you and how much to take  Take as directed  NSAIDs can cause stomach bleeding or kidney problems if not taken correctly  ¨ Steroid injection: This medicine helps decrease inflammation  It is given as a shot into your finger  You may need more than 1 injection  · Splint:  You may need to wear a splint for up to 6 weeks to keep your finger straight  This will help your finger joints rest and prevent you from bending your finger while you sleep      · Physical therapy:  A physical therapist teaches you exercises to help improve movement and strength, and to decrease pain  · Tendon release: This is surgery to cut open a small piece of the tendon sheath so that your tendon can slide smoothly  Your healthcare provider may do this through an incision or with a needle  What are the risks of trigger finger? Splinting may not decrease your signs and symptoms  Steroid injections or tendon release surgery may damage the tendon or nerves in your finger  After tendon release surgery, your finger may be stiff, painful, or weak  Your finger may be bruised and you may get an infection  Your signs and symptoms may return, even after treatment  Without treatment, your symptoms can get worse  Your finger may become locked in the bent position  When should I contact my healthcare provider? · Your symptoms do not go away or they return, even after treatment  · The pain, swelling, or stiffness interferes with your daily activities  · You have more trouble moving your finger  · Your finger is tingling  · You have questions or concerns about your condition or care  When should I seek immediate care? · You cannot move your finger at all  · Your finger is numb  CARE AGREEMENT:   You have the right to help plan your care  Learn about your health condition and how it may be treated  Discuss treatment options with your caregivers to decide what care you want to receive  You always have the right to refuse treatment  The above information is an  only  It is not intended as medical advice for individual conditions or treatments  Talk to your doctor, nurse or pharmacist before following any medical regimen to see if it is safe and effective for you  © 2017 [address] [employer] Information is for End User's use only and may not be sold, redistributed or otherwise used for commercial purposes   All illustrations and images included in CareNotes® are the copyrighted property of [employer]  or [employer]

## 2018-10-18 NOTE — PROGRESS NOTES
FAMILY PRACTICE OFFICE VISIT       NAME: Barb Busch  AGE: 48 y o  SEX: female       : 1965        MRN: [id number]        Assessment and Plan     Problem List Items Addressed This Visit     Depression with anxiety    Dyslipidemia    Relevant Orders    Lipid Panel with Direct LDL reflex      Other Visit Diagnoses     Well adult exam    -  Primary    Relevant Orders    Ambulatory referral to Obstetrics / Gynecology    Refused influenza vaccine        Relevant Orders    influenza vaccine, [id number], quadrivalent, recombinant, PF, 0 5 mL, for patients 18 yr+ (FLUBLOK)    Vitamin D deficiency        Screening for breast cancer        Relevant Orders    Mammo screening bilateral w cad    Chronic right-sided low back pain with right-sided sciatica        Relevant Orders    XR spine lumbar complete w bending minimum 6 views    EMG 2 limb lower extremity    Tinea pedis of both feet        Relevant Medications    clotrimazole-betamethasone (LOTRISONE) 1-0 05 % cream       Patient presents for annual well exam   She will use finger splints for treatment of 3rd and 4th right trigger fingers and will proceed with evaluation by [facility] Orthopedic surgery if symptoms will not improve  Will treat tenia rash with Lotrisone cream   Patient will schedule wart removal on right hand at her convenience  She is not up-to-date with health maintenance  Referral to mammography and gyn exam   Patient is also past due colonoscopy and is planning to schedule  We discussed importance of tobacco cessation  Patient will start fatty 3 Omega acids for mild dyslipidemia  Repeat lipid panel in 6 months  She remains on Paxil CR 12 5 mg daily for treatment of longstanding anxiety, she has been under lot of family related stress lately    Will evaluate chronic low back pain with x-ray and EMG/nerve conduction study    Patient Instructions                                                                      ResQue 1250  -  Fish oil supplement,  Around 1072-4197 mg daily    Vitamin D3 capsules  Daily  Dose of  2000 IU daily         Trigger Finger   WHAT YOU NEED TO KNOW:   What is trigger finger? Trigger finger is when your finger or thumb gets stuck in a bent position and snaps, pops, or clicks when you straighten it  What causes trigger finger? Trigger finger is caused by narrowing of the tendon sheath  The tendon sheath is the tunnel that your tendon slides through when you bend or straighten your finger  A tendon is strong tissue that attaches muscle to bone  When the tendon sheath narrows, the tendon does not slide as easily  Certain medical conditions, such as diabetes or arthritis, may increase your risk of trigger finger  What are the signs and symptoms of trigger finger? · Clicking, snapping, or popping noise when you move your finger    · Finger stuck in a bent position    · Pain    · Swelling and stiffness    · A bump at the base of your finger  How is trigger finger diagnosed? Your healthcare provider will ask about your health history and examine your finger  He will ask about your signs and symptoms and have you bend and straighten your finger  How is trigger finger treated? · Medicines:      ¨ NSAIDs:  These medicines decrease pain and swelling  NSAIDs can be bought without a doctor's order  Ask which medicine is right for you and how much to take  Take as directed  NSAIDs can cause stomach bleeding or kidney problems if not taken correctly  ¨ Steroid injection: This medicine helps decrease inflammation  It is given as a shot into your finger  You may need more than 1 injection  · Splint:  You may need to wear a splint for up to 6 weeks to keep your finger straight  This will help your finger joints rest and prevent you from bending your finger while you sleep  · Physical therapy:  A physical therapist teaches you exercises to help improve movement and strength, and to decrease pain       · Tendon release: This is surgery to cut open a small piece of the tendon sheath so that your tendon can slide smoothly  Your healthcare provider may do this through an incision or with a needle  What are the risks of trigger finger? Splinting may not decrease your signs and symptoms  Steroid injections or tendon release surgery may damage the tendon or nerves in your finger  After tendon release surgery, your finger may be stiff, painful, or weak  Your finger may be bruised and you may get an infection  Your signs and symptoms may return, even after treatment  Without treatment, your symptoms can get worse  Your finger may become locked in the bent position  When should I contact my healthcare provider? · Your symptoms do not go away or they return, even after treatment  · The pain, swelling, or stiffness interferes with your daily activities  · You have more trouble moving your finger  · Your finger is tingling  · You have questions or concerns about your condition or care  When should I seek immediate care? · You cannot move your finger at all  · Your finger is numb  CARE AGREEMENT:   You have the right to help plan your care  Learn about your health condition and how it may be treated  Discuss treatment options with your caregivers to decide what care you want to receive  You always have the right to refuse treatment  The above information is an  only  It is not intended as medical advice for individual conditions or treatments  Talk to your doctor, nurse or pharmacist before following any medical regimen to see if it is safe and effective for you  © 2017 [employer] Information is for End User's use only and may not be sold, redistributed or otherwise used for commercial purposes  All illustrations and images included in CareNotes® are the copyrighted property of [employer]  or [employer]              Chief Complaint     Chief Complaint   Patient presents with    Physical Exam     Pt is here for physical       History of Present Illness     Patient presents for annual well exam   Results of recent blood work discussed  Mild dyslipidemia  Total cholesterol is 214 with LDL of 125, HDL is normal at 71, triglycerides 82  CMP reveals fasting glucose of 99 and otherwise is normal   TSH is normal at 1 99, CBC is within normal limits  Vitamin-D level is decreased at 22  Patient is not up-to-date with gyn exam and mammography  She is still unfortunately smoking approximately 13 cigarettes per day and is trying to quit  She is complaining of persistent bump on the right metal in her finger, concerned about possibility of wart  Patient denies any hand numbness, tingling or paresthesias  Trigger fingers:  3rd and 4th right hand, worse in the morning  She is a   Right handed, chronic overuse  Pruritic rash right foot and lower calf  Occasional right low back pain, radiating to the right leg, no injury or fall, no dysuria bowel/bladder dysfunction  Rash of  Pityriasis rosea  has almost resolved        Review of Systems   Review of Systems   Constitutional: Negative  HENT: Negative  Eyes: Negative  Respiratory: Negative  Gastrointestinal: Negative  Endocrine: Negative  Genitourinary: Negative  Musculoskeletal: Positive for arthralgias and back pain  Negative for joint swelling  Trigger fingers right hand  Low back pain with right sciatica   Skin: Positive for rash  Wart right 3rd digit, eczema left lower leg   Allergic/Immunologic: Negative  Neurological: Negative  Hematological: Negative  Psychiatric/Behavioral: The patient is nervous/anxious          Active Problem List     Patient Active Problem List   Diagnosis    Biliary dyskinesia    Depression with anxiety    Dyslipidemia    Insomnia    TMJ arthralgia       Past Medical History:  Past Medical History:   Diagnosis Date    Headache        Past Surgical History:  History reviewed  No pertinent surgical history  Family History:  Family History   Problem Relation Age of Onset    Seizures Sister     Lupus Sister        Social History:  Social History     Social History    Marital status: /Civil Union     Spouse name: N/A    Number of children: N/A    Years of education: N/A     Occupational History     [employer]     Social History Main Topics    Smoking status: Current Every Day Smoker     Packs/day: 0 50     Years: 25 00    Smokeless tobacco: Never Used    Alcohol use Yes      Comment: occasionally    Drug use: No    Sexual activity: Not on file     Other Topics Concern    Not on file     Social History Narrative    No narrative on file     Ready to quit: Yes  Counseling given: Yes        Objective     Vitals:    10/18/18 1517   BP: 120/70   Pulse: 68   Resp: 16   Temp: 98 7 °F (37 1 °C)   TempSrc: Tympanic   Weight: 59 4 kg (131 lb)   Height: 5' 0 63" (1 54 m)     Wt Readings from Last 3 Encounters:   10/18/18 59 4 kg (131 lb)   10/01/18 59 9 kg (132 lb)   06/29/18 61 kg (134 lb 6 4 oz)       Physical Exam   Constitutional: She is oriented to person, place, and time  She appears well-developed and well-nourished  HENT:   Head: Normocephalic and atraumatic  Eyes: Pupils are equal, round, and reactive to light  Conjunctivae are normal    Neck: Normal range of motion  Neck supple  No thyromegaly present  Cardiovascular: Normal rate, regular rhythm and normal heart sounds  No murmur heard  Pulmonary/Chest: Effort normal and breath sounds normal  She has no wheezes  She has no rales  Abdominal: Soft  Bowel sounds are normal  She exhibits no abdominal bruit  There is no tenderness  Musculoskeletal: Normal range of motion  She exhibits no edema  Neurological: She is alert and oriented to person, place, and time  No cranial nerve deficit  Skin: No rash noted       Right 3rd digit:  Inner mid surface:  Approximately 4-5 mm raised hyper keratinized verruca lesion  Left foot:  Tenia rash, irritated, pruritic   Psychiatric: She has a normal mood and affect  Judgment and thought content normal    Nursing note and vitals reviewed        Pertinent Laboratory/Diagnostic Studies:  Lab Results   Component Value Date    BUN 18 05/02/2017    CREATININE 0 68 05/02/2017    CALCIUM 9 2 05/02/2017     05/02/2017    K 4 0 05/02/2017    CO2 26 05/02/2017     05/02/2017     Lab Results   Component Value Date    ALT 12 05/02/2017    AST 12 05/02/2017    ALKPHOS 74 05/02/2017    BILITOT 0 5 05/02/2017       Lab Results   Component Value Date    WBC 8 0 05/02/2017    HGB 13 8 05/02/2017    HCT 41 0 05/02/2017    MCV 93 3 05/02/2017     05/02/2017       No results found for: TSH    Lab Results   Component Value Date    CHOL 204 (H) 05/02/2017     Lab Results   Component Value Date    TRIG 117 05/02/2017     Lab Results   Component Value Date    HDL 58 05/02/2017     No results found for: LDLCALC  No results found for: HGBA1C    Results for orders placed or performed in visit on 05/03/17   TSH, 3RD GENERATION (HISTORICAL)   Result Value Ref Range    TSH 3RD GENERATON 2 35 mIU/L   CBC AND PLATELET (HISTORICAL)   Result Value Ref Range    WBC 8 0 3 8 - 10 8 Thousand/uL    RBC 4 39 3 80 - 5 10 Million/uL    Hemoglobin 13 8 11 7 - 15 5 g/dL    Hematocrit 41 0 35 0 - 45 0 %    MCV 93 3 80 0 - 100 0 fL    MCH 31 3 27 0 - 33 0 pg    MCHC 33 5 32 0 - 36 0 g/dL    RDW 13 9 11 0 - 15 0 %    Platelets 376 375 - 557 Thousand/uL    MPV 8 8 7 5 - 12 5 fL   LIPID PANEL WITH DIRECT LDL REFLEX (HISTORICAL)   Result Value Ref Range    Cholesterol 204 (H) 125 - 200 mg/dL    HDL 58 > OR = 46 mg/dL    Triglycerides 117 <150 mg/dL    LDL CHOLESTEROL 123 <130 mg/dL (calc)    Chol/HDL Ratio 3 5 < OR = 5 0 (calc)    NON-HDL-CHOL (CHOL-HDL) 146 mg/dL (calc)       Orders Placed This Encounter   Procedures    Mammo screening bilateral w cad    XR spine lumbar complete w bending minimum 6 views    influenza vaccine, [id number], quadrivalent, recombinant, PF, 0 5 mL, for patients 18 yr+ (FLUBLOK)    Lipid Panel with Direct LDL reflex    Ambulatory referral to Obstetrics / Gynecology    EMG 2 limb lower extremity       ALLERGIES:  Allergies   Allergen Reactions    Azithromycin Nausea Only    Iodine Edema and Swelling     Reaction Date: [id number];        Current Medications     Current Outpatient Prescriptions   Medication Sig Dispense Refill    PARoxetine (PAXIL-CR) 12 5 mg 24 hr tablet TAKE 1 TABLET DAILY  90 tablet 1    triamcinolone (KENALOG) 0 1 % cream Apply topically 2 (two) times a day 80 g 0    clotrimazole-betamethasone (LOTRISONE) 1-0 05 % cream Apply topically 2 (two) times a day 45 g 0     No current facility-administered medications for this visit  Health Maintenance     Health Maintenance   Topic Date Due    CRC Screening: Colonoscopy  1965    Pneumococcal PPSV23 Medium Risk Adult (1 of 1 - PPSV23) 03/09/1984    DTaP,Tdap,and Td Vaccines (1 - Tdap) 03/09/1986    INFLUENZA VACCINE  07/01/2018     There is no immunization history for the selected administration types on file for this patient      Steffanie Osler, MD

## 2019-01-10 ENCOUNTER — HOSPITAL ENCOUNTER (OUTPATIENT)
Dept: NEUROLOGY | Facility: CLINIC | Age: 54
Discharge: HOME | End: 2019-01-10
Payer: COMMERCIAL

## 2019-01-10 DIAGNOSIS — M54.41 CHRONIC RIGHT-SIDED LOW BACK PAIN WITH RIGHT-SIDED SCIATICA: ICD-10-CM

## 2019-01-10 DIAGNOSIS — G89.29 CHRONIC RIGHT-SIDED LOW BACK PAIN WITH RIGHT-SIDED SCIATICA: ICD-10-CM

## 2019-01-10 PROCEDURE — 95909 NRV CNDJ TST 5-6 STUDIES: CPT | Performed by: PHYSICAL MEDICINE & REHABILITATION

## 2019-01-10 PROCEDURE — 95886 MUSC TEST DONE W/N TEST COMP: CPT | Performed by: PHYSICAL MEDICINE & REHABILITATION

## 2019-01-10 NOTE — PROCEDURES
[facility] , [address], [address] [facility]  [phone]        Name: Naina Samson  Patient ID: [id number]   Age: 48 Years 6 Months  YOB: 1965   Account Number:    Gender: Female   Technologist:    Date of Exam: 1/10/2019 10:50   Referring Physician: Dominique Wahl MD  Temperature: 35   Examining Physician: Sammie Queen MD  Height:                 Patient History:   48 y o female with 1 year history of low back pain, right lateral thigh numbness  Endorses subjective RLE weakness  No history of back surgeries  Referred for lumbar radiculopathy eval      5/5 bilateral EHL, dorsi/plantarflexion, knee extension, hip flexion         MNC      Nerve / Sites Muscle Latency Ref  Amplitude Ref  Duration Rel Amp Distance Lat Diff Velocity Ref  ms ms mV mV ms % mm ms m/s m/s   R Peroneal - EDB      Ankle EDB 4 95 ?5 50 4 6 ?2 0 5 05 100 80         Fib head EDB 10 47  3 9  5 63 83 5 255 5 52 46 ?40      Pop fossa EDB 12 50  3 6  5 63 92 3 100 2 03 49 ?40            7 55     R Tibial - AH      Ankle AH 4 27 ?6 00 9 1 ?4 0 5 68 100 100         Pop fossa AH 10 52  8 3  6 82 90 8 290 6 25 46 ?40       SNC      Nerve / Sites Rec  Site Onset Lat Peak Lat Ref  Amp Ref  Distance Velocity Ref  ms ms ms µV µV mm m/s m/s   R Sural - Ankle (Calf)      Calf Ankle 2 40 3 18 ?4 00 14 6 ?6 0 140 58 ?40   R Superficial peroneal - Ankle      Lat leg Ankle 2 50 3 39 ?3 50 5 8 ?6 0 100 40 ?40   R Lateral femoral cutaneous - Thigh (Inguinal ligament)      A  Ing ligament Thigh 2 81 3 65  4 3       L Lateral femoral cutaneous - Thigh (Inguinal ligament)      A  Ing ligament Thigh 3 70 4 58  3 9           EMG         Needle EMG Examination     Insertional Spontaneous MUAP   Muscle Nerve Roots Activity Fib PSW Fasc Dur  Amp Poly Config Recruitment   R  Vastus medialis Femoral L2-L4 Normal None None None Normal Normal None Normal Normal   R   Tibialis anterior Peroneal L4-L5 Normal None None None Normal Normal None Normal Normal   R  Gastrocnemius (Medial head) Tibial S1-S2 Normal None None None Normal Normal None Normal Normal   R  Peroneus longus Perineal L5-S1 Normal None None None Normal Normal None Normal Normal   R  Gluteus medius Superior gluteal L4-S1 Normal None None None Normal Normal None Normal Normal   R  Iliopsoas Femoral L2-L3 Normal None None None Normal Normal None Normal Normal         Findings:   Motor:  Right peroneal compound motor action potential (CMAP) to the extensor digitorum brevis demonstrated normal distal latency, normal amplitude, and normal conduction velocity across the fibular head and lower leg  Right tibial compound motor action potential (CMAP) to the abductor hallucis demonstrated normal distal latency, normal amplitude, and normal conduction velocity across the lower leg  Sensory:  Right sural sensory nerve action potential (SNAP) demonstrated normal peak latency and normal amplitude  Right superficial peroneal sensory nerve action potential (SNAP) demonstrated normal peak latency and mildly decreased amplitude  Right and left lateral femoral cutaneous SNAPs demonstrate similar amplitudes  The left lateral femoral cutaneous SNAP demonstrated prolonged peak latency compared to the right lateral femoral cutaneous SNAP      EMG:  A disposable monopolar needle was used to study selected muscles in the right lower extremity including the tibialis anterior, medial gastrocnemius, peroneus longus, vastus medialis, iliopsoas, and gluteus medius  All muscles tested demonstrated normal insertional activity, no abnormal spontaneous activity, and normal volitional motor unit action potentials  Impression: Normal study  1  There is no electrodiagnostic evidence of a right tibial or peroneal entrapment mononeuropathy  2  There is no electrodiagnostic evidence of a right lateral femoral cutaneous nerve mononeuropathy across the inguinal ligament     3  There is no electrodiagnostic evidence of a right lumbosacral plexopathy or lumbar radiculopathy  4  There is no electrodiagnostic evidence of a large fiber peripheral polyneuropathy               ___________________________  Sammie Queen MD

## 2019-01-13 ENCOUNTER — TELEPHONE (OUTPATIENT)
Dept: FAMILY MEDICINE CLINIC | Facility: CLINIC | Age: 54
End: 2019-01-13

## 2019-01-13 DIAGNOSIS — G89.29 CHRONIC RIGHT-SIDED LOW BACK PAIN WITH RIGHT-SIDED SCIATICA: Primary | ICD-10-CM

## 2019-01-13 DIAGNOSIS — M54.41 CHRONIC RIGHT-SIDED LOW BACK PAIN WITH RIGHT-SIDED SCIATICA: Primary | ICD-10-CM

## 2019-01-13 DIAGNOSIS — M79.604 PAIN OF RIGHT LOWER EXTREMITY: ICD-10-CM

## 2019-01-14 NOTE — TELEPHONE ENCOUNTER
Please contact patient  I received results of EMG/nerve conduction study  They are normal     Please advise patient to proceed with x-ray of lumbar spine and will also add x-ray of right hip to her imaging    Thank you

## 2019-02-22 RX ORDER — DIPHENOXYLATE HYDROCHLORIDE AND ATROPINE SULFATE 2.5; .025 MG/1; MG/1
1 TABLET ORAL
COMMUNITY
End: 2021-07-13

## 2019-02-25 ENCOUNTER — OFFICE VISIT (OUTPATIENT)
Dept: FAMILY MEDICINE CLINIC | Facility: CLINIC | Age: 54
End: 2019-02-25
Payer: COMMERCIAL

## 2019-02-25 VITALS
HEART RATE: 76 BPM | SYSTOLIC BLOOD PRESSURE: 102 MMHG | BODY MASS INDEX: 25.68 KG/M2 | TEMPERATURE: 97 F | DIASTOLIC BLOOD PRESSURE: 80 MMHG | RESPIRATION RATE: 16 BRPM | HEIGHT: 61 IN | WEIGHT: 136 LBS

## 2019-02-25 DIAGNOSIS — Z12.11 SCREENING FOR COLON CANCER: ICD-10-CM

## 2019-02-25 DIAGNOSIS — Z11.59 NEED FOR HEPATITIS C SCREENING TEST: ICD-10-CM

## 2019-02-25 DIAGNOSIS — M25.521 RIGHT ELBOW PAIN: Primary | ICD-10-CM

## 2019-02-25 DIAGNOSIS — M17.11 PRIMARY OSTEOARTHRITIS OF RIGHT KNEE: ICD-10-CM

## 2019-02-25 DIAGNOSIS — M65.30 TRIGGER FINGER OF RIGHT HAND, UNSPECIFIED FINGER: ICD-10-CM

## 2019-02-25 PROCEDURE — 3008F BODY MASS INDEX DOCD: CPT | Performed by: FAMILY MEDICINE

## 2019-02-25 PROCEDURE — 4004F PT TOBACCO SCREEN RCVD TLK: CPT | Performed by: FAMILY MEDICINE

## 2019-02-25 PROCEDURE — 99213 OFFICE O/P EST LOW 20 MIN: CPT | Performed by: FAMILY MEDICINE

## 2019-02-25 NOTE — PROGRESS NOTES
FAMILY PRACTICE OFFICE VISIT       NAME: Gabriel José  AGE: 48 y o  SEX: female       : 1965        MRN: [id number]        Assessment and Plan     Problem List Items Addressed This Visit     None      Visit Diagnoses     Right elbow pain    -  Primary    Relevant Orders    XR elbow 2 vw right    Ambulatory referral to Orthopedic Surgery    Screening for colon cancer        Relevant Orders    Occult Blood, Fecal Immunochemical    Need for hepatitis C screening test        Relevant Orders    Hepatitis C antibody    Trigger finger of right hand, unspecified finger        Relevant Orders    Ambulatory referral to Orthopedic Surgery    Primary osteoarthritis of right knee        Relevant Orders    XR knee 3 vw right non injury       Patient presents for evaluation of right elbow pain  Concerned about primary arthritis  She will be proceeding with x-ray of right elbow along with x-rays of right hip, lumbar sacral spine, will add x-ray of right knee due to persistent sensation of numbness in the right knee radiating up to the right anterior lateral thigh  I reminded patient to schedule mammography  Patient will be proceeding with fecal test for colorectal screening, she prefers to hold off colonoscopy at present time  We discussed benefits of colonoscopy as primary tool for colorectal screening  I reminded patient to schedule mammography  Referral to [facility]/Hand surgery for evaluation of trigger fingers and right elbow pain  I will be contacting patient pending x-ray results  She would likely need follow-up with Dr Weir as well regarding chronic right leg paresthesias    I reminded patient to proceed with annual gyn exam     I have spent 15 minutes with Patient  today in which greater than 50% of this time was spent in counseling/coordination of care regarding Diagnostic results, Intructions for management, Patient and family education, Importance of tx compliance, Risk factor reductions and Impressions  2    There are no Patient Instructions on file for this visit  M*Modal software was used to dictate this note  It may contain errors with dictating incorrect words/spelling  Please contact provider directly with any questions  Chief Complaint     Chief Complaint   Patient presents with    Elbow Pain     Pt is here w/ c/o right elbow pain x 6 months        History of Present Illness     Patient presents for evaluation of right elbow pain  She denies injury or fall  She denies swelling  Patient is right handed, , pain and right elbow and intermittent discomfort due to trigger fingers in the right hand are affecting her job performance  Patient states that right elbow usually hurts with pressure or wearing heavy clothes  No numbness or tingling in the right forearm  Patient is still experiencing intermittent sensation of numbness in her right anterior lateral thigh  EMG performed in January 2019 was normal   Patient is planning to proceed with x-rays of lower back and right hip  She is also complaining of intermittent numb sensation in her right knee radiating up to the right thigh  Patient did not schedule mammography or colonoscopy so far  She is taking care of her  with multiple chronic medical conditions and believes that she has not time for colonoscopy at present time  She is interested in proceeding with stool testing for the time being  Review of Systems   Review of Systems   Constitutional: Negative  HENT: Negative  Respiratory: Negative  Cardiovascular: Negative  Musculoskeletal: Positive for arthralgias ( right elbow pain)  Neurological: Positive for numbness (Right anterior lateral thigh)  Psychiatric/Behavioral: Negative          Active Problem List     Patient Active Problem List   Diagnosis    Biliary dyskinesia    Depression with anxiety    Dyslipidemia    Insomnia    TMJ arthralgia    Chronic right-sided low back pain with right-sided sciatica    Pain of right lower extremity       Past Medical History:  Past Medical History:   Diagnosis Date    Headache        Past Surgical History:  History reviewed  No pertinent surgical history      Family History:  Family History   Problem Relation Age of Onset    Seizures Sister     Lupus Sister        Social History:  Social History     Socioeconomic History    Marital status: /Civil Union     Spouse name: Not on file    Number of children: Not on file    Years of education: Not on file    Highest education level: Not on file   Occupational History     Employer: [employer]   Social Needs    Financial resource strain: Not on file    Food insecurity:     Worry: Not on file     Inability: Not on file    Transportation needs:     Medical: Not on file     Non-medical: Not on file   Tobacco Use    Smoking status: Current Every Day Smoker     Packs/day: 0 50     Years: 25 00     Pack years: 12 50    Smokeless tobacco: Never Used   Substance and Sexual Activity    Alcohol use: Yes     Comment: occasionally    Drug use: No    Sexual activity: Not on file   Lifestyle    Physical activity:     Days per week: Not on file     Minutes per session: Not on file    Stress: Not on file   Relationships    Social connections:     Talks on phone: Not on file     Gets together: Not on file     Attends Confucianism service: Not on file     Active member of club or organization: Not on file     Attends meetings of clubs or organizations: Not on file     Relationship status: Not on file    Intimate partner violence:     Fear of current or ex partner: Not on file     Emotionally abused: Not on file     Physically abused: Not on file     Forced sexual activity: Not on file   Other Topics Concern    Not on file   Social History Narrative    Not on file         Objective     Vitals:    02/25/19 0738   BP: 102/80   BP Location: Left arm   Patient Position: Sitting   Cuff Size: Adult   Pulse: 76   Resp: 16   Temp: (!) 97 °F (36 1 °C)   TempSrc: Tympanic   Weight: 61 7 kg (136 lb)   Height: 5' 0 63" (1 54 m)     Wt Readings from Last 3 Encounters:   02/25/19 61 7 kg (136 lb)   10/18/18 59 4 kg (131 lb)   10/01/18 59 9 kg (132 lb)       Physical Exam   Constitutional: She appears well-developed and well-nourished  Musculoskeletal:   Right elbow:  Full range of motion, tenderness at the tip of right elbow, reproducing patient's discomfort   Mild warmth, no edema or erythema  Painful flexion, painless extension   Psychiatric: She has a normal mood and affect  Her behavior is normal    Nursing note and vitals reviewed        Pertinent Laboratory/Diagnostic Studies:  Lab Results   Component Value Date    BUN 18 05/02/2017    CREATININE 0 68 05/02/2017    CALCIUM 9 2 05/02/2017     05/02/2017    K 4 0 05/02/2017    CO2 26 05/02/2017     05/02/2017     Lab Results   Component Value Date    ALT 12 05/02/2017    AST 12 05/02/2017    ALKPHOS 74 05/02/2017    BILITOT 0 5 05/02/2017       Lab Results   Component Value Date    WBC 8 0 05/02/2017    HGB 13 8 05/02/2017    HCT 41 0 05/02/2017    MCV 93 3 05/02/2017     05/02/2017       No results found for: TSH    Lab Results   Component Value Date    CHOL 204 (H) 05/02/2017     Lab Results   Component Value Date    TRIG 117 05/02/2017     Lab Results   Component Value Date    HDL 58 05/02/2017     No results found for: LDLCALC  No results found for: HGBA1C    Results for orders placed or performed in visit on 05/03/17   TSH, 3RD GENERATION (HISTORICAL)   Result Value Ref Range    TSH 3RD GENERATON 2 35 mIU/L   CBC AND PLATELET (HISTORICAL)   Result Value Ref Range    WBC 8 0 3 8 - 10 8 Thousand/uL    RBC 4 39 3 80 - 5 10 Million/uL    Hemoglobin 13 8 11 7 - 15 5 g/dL    Hematocrit 41 0 35 0 - 45 0 %    MCV 93 3 80 0 - 100 0 fL    MCH 31 3 27 0 - 33 0 pg    MCHC 33 5 32 0 - 36 0 g/dL    RDW 13 9 11 0 - 15 0 %    Platelets 401 547 - 908 Thousand/uL MPV 8 8 7 5 - 12 5 fL   LIPID PANEL WITH DIRECT LDL REFLEX (HISTORICAL)   Result Value Ref Range    Cholesterol 204 (H) 125 - 200 mg/dL    HDL 58 > OR = 46 mg/dL    Triglycerides 117 <150 mg/dL    LDL CHOLESTEROL 123 <130 mg/dL (calc)    Chol/HDL Ratio 3 5 < OR = 5 0 (calc)    NON-HDL-CHOL (CHOL-HDL) 146 mg/dL (calc)       Orders Placed This Encounter   Procedures    Occult Blood, Fecal Immunochemical    XR elbow 2 vw right    XR knee 3 vw right non injury    Hepatitis C antibody    Ambulatory referral to Orthopedic Surgery       ALLERGIES:  Allergies   Allergen Reactions    Azithromycin Nausea Only    Iodine Edema and Swelling     Reaction Date: [id number];        Current Medications     Current Outpatient Medications   Medication Sig Dispense Refill    clotrimazole-betamethasone (LOTRISONE) 1-0 05 % cream Apply topically 2 (two) times a day 45 g 0    multivitamin (THERAGRAN) TABS Take 1 tablet by mouth      PARoxetine (PAXIL-CR) 12 5 mg 24 hr tablet TAKE 1 TABLET DAILY  90 tablet 1    triamcinolone (KENALOG) 0 1 % cream Apply topically 2 (two) times a day 80 g 0     No current facility-administered medications for this visit            Health Maintenance     Health Maintenance   Topic Date Due    Hepatitis C Screening  1965    MAMMOGRAM  1965    CRC Screening: Colonoscopy  1965    BMI: Followup Plan  03/09/1983    Pneumococcal PPSV23 Medium Risk Adult (1 of 1 - PPSV23) 03/09/1984    DTaP,Tdap,and Td Vaccines (1 - Tdap) 03/09/1986    PAP SMEAR  06/15/2019    BMI: Adult  02/25/2020    INFLUENZA VACCINE  Completed    HEPATITIS B VACCINES  Aged Out     Immunization History   Administered Date(s) Administered     Influenza (IM) Preservative Free 11/01/2018       Skinny Bravo MD

## 2019-03-07 ENCOUNTER — TELEPHONE (OUTPATIENT)
Dept: FAMILY MEDICINE CLINIC | Facility: CLINIC | Age: 54
End: 2019-03-07

## 2019-03-07 NOTE — TELEPHONE ENCOUNTER
I reviewed results of recent x-rays  26 degree levoscoliosis  L1-L2 and L2-L3 moderate degenerative disc disease of lumbar spine  Moderate arthritic changes of left SI joint mild DJD of right hip DJD  Unremarkable right knee and elbow

## 2019-03-13 DIAGNOSIS — F32.A DEPRESSION, UNSPECIFIED DEPRESSION TYPE: ICD-10-CM

## 2019-03-13 RX ORDER — PAROXETINE HYDROCHLORIDE 12.5 MG/1
TABLET, FILM COATED, EXTENDED RELEASE ORAL
Qty: 90 TABLET | Refills: 1 | Status: SHIPPED | OUTPATIENT
Start: 2019-03-13 | End: 2019-09-29 | Stop reason: SDUPTHER

## 2019-03-14 ENCOUNTER — TELEPHONE (OUTPATIENT)
Dept: FAMILY MEDICINE CLINIC | Facility: CLINIC | Age: 54
End: 2019-03-14

## 2019-03-14 DIAGNOSIS — G89.29 CHRONIC RIGHT-SIDED LOW BACK PAIN WITH RIGHT-SIDED SCIATICA: Primary | ICD-10-CM

## 2019-03-14 DIAGNOSIS — M54.41 CHRONIC RIGHT-SIDED LOW BACK PAIN WITH RIGHT-SIDED SCIATICA: Primary | ICD-10-CM

## 2019-03-14 NOTE — TELEPHONE ENCOUNTER
I spoke with patient tonight regarding results of her x-rays  She is scheduled to see hand specialist next week  Will also refer her to Pain Management due to findings on x-ray of lumbar sacral spine, sacroiliac joint and her right hip  Patient understands instructions and agrees

## 2019-03-20 ENCOUNTER — OFFICE VISIT (OUTPATIENT)
Dept: OBGYN CLINIC | Facility: HOSPITAL | Age: 54
End: 2019-03-20
Payer: COMMERCIAL

## 2019-03-20 VITALS
DIASTOLIC BLOOD PRESSURE: 77 MMHG | WEIGHT: 130 LBS | SYSTOLIC BLOOD PRESSURE: 118 MMHG | HEIGHT: 61 IN | HEART RATE: 73 BPM | BODY MASS INDEX: 24.55 KG/M2

## 2019-03-20 DIAGNOSIS — M65.341 TRIGGER RING FINGER OF RIGHT HAND: Primary | ICD-10-CM

## 2019-03-20 DIAGNOSIS — M65.331 TRIGGER MIDDLE FINGER OF RIGHT HAND: ICD-10-CM

## 2019-03-20 DIAGNOSIS — M25.521 PAIN IN RIGHT ELBOW: ICD-10-CM

## 2019-03-20 PROCEDURE — 99243 OFF/OP CNSLTJ NEW/EST LOW 30: CPT | Performed by: SURGERY

## 2019-03-20 PROCEDURE — 20550 NJX 1 TENDON SHEATH/LIGAMENT: CPT | Performed by: SURGERY

## 2019-03-20 RX ORDER — LIDOCAINE HYDROCHLORIDE 20 MG/ML
1 INJECTION, SOLUTION EPIDURAL; INFILTRATION; INTRACAUDAL; PERINEURAL
Status: COMPLETED | OUTPATIENT
Start: 2019-03-20 | End: 2019-03-20

## 2019-03-20 RX ORDER — BETAMETHASONE SODIUM PHOSPHATE AND BETAMETHASONE ACETATE 3; 3 MG/ML; MG/ML
6 INJECTION, SUSPENSION INTRA-ARTICULAR; INTRALESIONAL; INTRAMUSCULAR; SOFT TISSUE
Status: COMPLETED | OUTPATIENT
Start: 2019-03-20 | End: 2019-03-20

## 2019-03-20 RX ADMIN — LIDOCAINE HYDROCHLORIDE 1 ML: 20 INJECTION, SOLUTION EPIDURAL; INFILTRATION; INTRACAUDAL; PERINEURAL at 17:22

## 2019-03-20 RX ADMIN — BETAMETHASONE SODIUM PHOSPHATE AND BETAMETHASONE ACETATE 6 MG: 3; 3 INJECTION, SUSPENSION INTRA-ARTICULAR; INTRALESIONAL; INTRAMUSCULAR; SOFT TISSUE at 17:22

## 2019-03-20 RX ADMIN — BETAMETHASONE SODIUM PHOSPHATE AND BETAMETHASONE ACETATE 6 MG: 3; 3 INJECTION, SUSPENSION INTRA-ARTICULAR; INTRALESIONAL; INTRAMUSCULAR; SOFT TISSUE at 17:21

## 2019-03-20 RX ADMIN — LIDOCAINE HYDROCHLORIDE 1 ML: 20 INJECTION, SOLUTION EPIDURAL; INFILTRATION; INTRACAUDAL; PERINEURAL at 17:21

## 2019-03-20 NOTE — LETTER
March 20, 2019     Adelaide Vázquez MD  [address] [facility] [address]    Patient: Shay Parent   YOB: 1965   Date of Visit: 3/20/2019       Dear Dr Burroughs : Thank you for referring Susanna Gutierrez to me for evaluation  Below are my notes for this consultation  If you have questions, please do not hesitate to call me  I look forward to following your patient along with you  Sincerely,        Hector Rodriguez MD        CC: No Recipients  Hector Rodriguez MD  3/20/2019  5:33 PM  Sign at close encounter  Hector LANCASTER  Attending, Orthopaedic Surgery  Hand, Wrist, and Elbow Surgery  [facility]      ORTHOPAEDIC HAND, WRIST, AND ELBOW OFFICE  VISIT       ASSESSMENT/PLAN:      47 y o  female with a right ring and long finger trigger finger and right elbow bursa thickening  Treatment options were discussed with Antonieta Garcia today being ring and long finger CSI's for trigger fingers  It was discussed with Antonieta Garcia today that if she gets some relief following the 1st CSI's she may undergo a second CSI's in 6 weeks time  If she gets no relief following the CSI's surgical release will be discussed  With regards to her elbow, she may benefit from taking NSAID's a CSI into her right elbow bursa was briefly discussed  Antonieta Garcia declined today due to risk of causing olecranon bursitis that potentially could require surgical intervention  We will see Antonieta Garcia back in 6 weeks time with regards to her trigger fingers  She should take ibuprofen or tylenol tonight due to increased pain from the CSI  The patient verbalized understanding of exam findings and treatment plan  We engaged in the shared decision-making process and treatment options were discussed at length with the patient  Surgical and conservative management discussed today along with risks and benefits      Diagnoses and all orders for this visit:    Trigger ring finger of right hand    Trigger middle finger of right hand    Pain in right elbow        Follow Up:  Return in about 6 weeks (around 5/1/2019)  To Do Next Visit:  Re-evaluation of current issue      General Discussions:  Trigger Finger: The anatomy and physiology of trigger finger was discussed with the patient today in the office  Edema and increased contact pressure within the flexor tendons at the A1 pulley can cause pain, crepitation, and triggering or locking of the digit resulting in limitation of function  Symptoms can occur at anytime but are typically worse in the morning or after a brief rest from repetitive activity  Treatment options include resting/nighttime MP blocking splints to decrease edema, oral anti-inflammatory medications, home or formal therapy exercises, up to 2 steroid injections within the tendon sheath, or surgical release  While majority of patients do respond to conservative treatment, up to 20% may require surgical release  Operative Discussions:  Trigger Finger Release: The anatomy and physiology of trigger finger was discussed with the patient today in the office  Edema and increased contact pressure within the flexor tendons at the A1 pulley can cause pain, crepitation, and limitation of function  Treatment options include resting MP blocking splints to decrease edema, oral anti-inflammatory medications, home or formal therapy exercises, up to 2 steroid injections or surgical release  While majority of patients do respond to conservative treatment, up to 20% may require surgical release  The patient has elected release of the trigger finger  The patient has elected to undergo a release of the A1 pulley (trigger finger)  A small incision will be made over the palmar aspect of the hand, the tendon sheath holding the flexor tendons will be released    In the postoperative period, light activities are allowed immediately, driving is allowed when narcotic medication has stopped, and the incision may get wet after 2 days  Heavy activities (lifting more than approximately 10 pounds) will be allowed after the follow up appointment in 1-2 weeks  While the pain and discomfort within the wrist typically improves rapidly, some residual discomfort may be present for up to 6 weeks  The nodule that is typically palpable in the palmar aspect of the hand will not be removed, as this would necessitate removal of a portion of the flexor tendon, however the catching, clicking, and locking should resolve  Approximate success rate is 98%  The risks and benefits of the procedure were explained to the patient, which include, but are not limited to: Bleeding, infection, recurrence, pain, scar, damage to tendons, damage to nerves, and damage to blood vessels, need for future surgery and complications related to anesthesia  If bony work is done, risks also include malunion and nonunion  These risks, along with alternative conservative treatment options, and postoperative protocols were voiced back and understood by the patient  All questions were answered to the patient's satisfaction  The patient agrees to comply with a standard postoperative protocol, and is willing to proceed  Education was provided via written and auditory forms  There were no barriers to learning  Written handouts regarding wound care, incision and scar care, and general preoperative information, as well as risks and benefits were provided to the patient       ____________________________________________________________________________________________________________________________________________      CHIEF COMPLAINT:  Chief Complaint   Patient presents with    Right Elbow - Pain    Right Middle Finger - Pain    Right Ring Finger - Pain       SUBJECTIVE:  Montana Amin is a 47y o  year old RHD female who presents to the office today for clicking, locking and catching of he right long and ring finger as well as right elbow pain   Beatrice Buchanan was referred to the office by her PCP  Anali Kimmaicol states that her right long and ring fingers have been clicking and locking for 2 months  She also states that she is experiencing pain over her A1 pulley of both of those fingers  She has not had to unlock her finger with her left hand  She also notes right elbow pain that has been ongoing for 1 yr  She denies any injury or trama to her right elbow  She notes that she experiences right elbow pain, only when she is leaning on her elbow  She is not taking anything for pain control  She denies associated numbness or tinging  Pain/symptom timing:  Worse during the day when active  Pain/symptom context:  Worse with activites and work  Pain/symptom modifying factors:  Rest makes better, activities make worse  Pain/symptom associated signs/symptoms: none    Prior treatment   · NSAIDsNo   · Injections No   · Bracing/Orthotics No    Physical Therapy No     I have personally reviewed all the relevant PMH, PSH, SH, FH, Medications and allergies      PAST MEDICAL HISTORY:  Past Medical History:   Diagnosis Date    Headache        PAST SURGICAL HISTORY:  History reviewed  No pertinent surgical history  FAMILY HISTORY:  Family History   Problem Relation Age of Onset    Seizures Sister     Lupus Sister        SOCIAL HISTORY:  Social History     Tobacco Use    Smoking status: Current Every Day Smoker     Packs/day: 0 50     Years: 25 00     Pack years: 12 50    Smokeless tobacco: Never Used   Substance Use Topics    Alcohol use: Yes     Comment: occasionally    Drug use: No       MEDICATIONS:    Current Outpatient Medications:     clotrimazole-betamethasone (LOTRISONE) 1-0 05 % cream, Apply topically 2 (two) times a day, Disp: 45 g, Rfl: 0    multivitamin (THERAGRAN) TABS, Take 1 tablet by mouth, Disp: , Rfl:     PARoxetine (PAXIL-CR) 12 5 mg 24 hr tablet, TAKE 1 TABLET DAILY  , Disp: 90 tablet, Rfl: 1    triamcinolone (KENALOG) 0 1 % cream, Apply topically 2 (two) times a day, Disp: 80 g, Rfl: 0    ALLERGIES:  Allergies   Allergen Reactions    Azithromycin Nausea Only    Iodine Edema and Swelling     Reaction Date: [id number];            REVIEW OF SYSTEMS:  Review of Systems   Constitutional: Negative for chills, fever and unexpected weight change  HENT: Negative for hearing loss, nosebleeds and sore throat  Eyes: Negative for pain, redness and visual disturbance  Respiratory: Negative for cough, shortness of breath and wheezing  Cardiovascular: Negative for chest pain, palpitations and leg swelling  Gastrointestinal: Negative for abdominal pain, nausea and vomiting  Endocrine: Negative for polydipsia and polyuria  Genitourinary: Negative for difficulty urinating and hematuria  Musculoskeletal: Negative for arthralgias, joint swelling and myalgias  Skin: Negative for rash and wound  Neurological: Negative for dizziness, numbness and headaches  Psychiatric/Behavioral: Negative for decreased concentration, dysphoric mood and suicidal ideas  The patient is not nervous/anxious          VITALS:  Vitals:    03/20/19 1654   BP: 118/77   Pulse: 73       LABS:  HgA1c: No results found for: HGBA1C  BMP:   Lab Results   Component Value Date    CALCIUM 9 2 05/02/2017     05/02/2017    K 4 0 05/02/2017    CO2 26 05/02/2017     05/02/2017    BUN 18 05/02/2017    CREATININE 0 68 05/02/2017       _____________________________________________________  PHYSICAL EXAMINATION:  General: well developed and well nourished, alert, oriented times 3 and appears comfortable  Psychiatric: Normal  HEENT: Normocephalic, Atraumatic Trachea Midline, No torticollis  Pulmonary: No audible wheezing or respiratory distress   Cardiovascular: No pitting edema, 2+ radial pulse   Skin: No masses, erythema, lacerations, fluctation, ulcerations  Neurovascular: Sensation Intact to the Median, Ulnar, Radial Nerve, Motor Intact to the Median, Ulnar, Radial Nerve and Pulses Intact  Musculoskeletal: Normal, except as noted in detailed exam and in HPI  MUSCULOSKELETAL EXAMINATION:    right long finger:  Positive palpable nodule over the A1 pulley  Positive tenderness to palpation over A1 pulley  Negative catching on exam  Positive clicking  right ring finger:  Positive palpable nodule over the A1 pulley  Positive tenderness to palpation over A1 pulley  Negative catching on exam  Positive clicking        Right elbow:  No erythema  No ecchymosis   No edema  Thickening over bursa, sightly tender to palpation   Normal ROM     ___________________________________________________  STUDIES REVIEWED:  I have personally reviewed AP lateral and oblique radiographs of right elbow without evidence of traction osteophyte or soft tissue mass          PROCEDURES PERFORMED:  Hand/upper extremity injection: R long A1  Date/Time: 3/20/2019 5:21 PM  Consent given by: patient  Site marked: site marked  Timeout: Immediately prior to procedure a time out was called to verify the correct patient, procedure, equipment, support staff and site/side marked as required   Supporting Documentation  Indications: pain   Procedure Details  Condition:trigger finger Location: long finger - R long A1   Preparation: Patient was prepped and draped in the usual sterile fashion  Needle size: 25 G  Ultrasound guidance: no  Medications administered: 1 mL lidocaine (PF) 2 %; 6 mg betamethasone acetate-betamethasone sodium phosphate 6 (3-3) mg/mL    Patient tolerance: patient tolerated the procedure well with no immediate complications  Dressing:  Sterile dressing applied     Hand/upper extremity injection: R ring A1  Date/Time: 3/20/2019 5:22 PM  Consent given by: patient  Site marked: site marked  Timeout: Immediately prior to procedure a time out was called to verify the correct patient, procedure, equipment, support staff and site/side marked as required   Supporting Documentation  Indications: pain   Procedure Details  Condition:trigger finger Location: ring finger - R ring A1   Preparation: Patient was prepped and draped in the usual sterile fashion  Needle size: 25 G  Ultrasound guidance: no  Medications administered: 1 mL lidocaine (PF) 2 %; 6 mg betamethasone acetate-betamethasone sodium phosphate 6 (3-3) mg/mL    Patient tolerance: patient tolerated the procedure well with no immediate complications  Dressing:  Sterile dressing applied            _____________________________________________________      Scribe Attestation    I,:   Mary Gordon am acting as a scribe while in the presence of the attending physician :        I,:   Marcelino Jorge MD personally performed the services described in this documentation    as scribed in my presence :

## 2019-03-20 NOTE — PROGRESS NOTES
Uzma LANCASTER  Attending, Orthopaedic Surgery  Hand, Wrist, and Elbow Surgery  [facility]      ORTHOPAEDIC HAND, WRIST, AND ELBOW OFFICE  VISIT       ASSESSMENT/PLAN:      47 y o  female with a right ring and long finger trigger finger and right elbow bursa thickening  Treatment options were discussed with Sheri Kearns today being ring and long finger CSI's for trigger fingers  It was discussed with Sheri Kearns today that if she gets some relief following the 1st CSI's she may undergo a second CSI's in 6 weeks time  If she gets no relief following the CSI's surgical release will be discussed  With regards to her elbow, she may benefit from taking NSAID's a CSI into her right elbow bursa was briefly discussed  Sheri Kearns declined today due to risk of causing olecranon bursitis that potentially could require surgical intervention  We will see Sheri Kearns back in 6 weeks time with regards to her trigger fingers  She should take ibuprofen or tylenol tonight due to increased pain from the CSI  The patient verbalized understanding of exam findings and treatment plan  We engaged in the shared decision-making process and treatment options were discussed at length with the patient  Surgical and conservative management discussed today along with risks and benefits  Diagnoses and all orders for this visit:    Trigger ring finger of right hand    Trigger middle finger of right hand    Pain in right elbow        Follow Up:  Return in about 6 weeks (around 5/1/2019)  To Do Next Visit:  Re-evaluation of current issue      General Discussions:  Trigger Finger: The anatomy and physiology of trigger finger was discussed with the patient today in the office  Edema and increased contact pressure within the flexor tendons at the A1 pulley can cause pain, crepitation, and triggering or locking of the digit resulting in limitation of function    Symptoms can occur at anytime but are typically worse in the morning or after a brief rest from repetitive activity  Treatment options include resting/nighttime MP blocking splints to decrease edema, oral anti-inflammatory medications, home or formal therapy exercises, up to 2 steroid injections within the tendon sheath, or surgical release  While majority of patients do respond to conservative treatment, up to 20% may require surgical release  Operative Discussions:  Trigger Finger Release: The anatomy and physiology of trigger finger was discussed with the patient today in the office  Edema and increased contact pressure within the flexor tendons at the A1 pulley can cause pain, crepitation, and limitation of function  Treatment options include resting MP blocking splints to decrease edema, oral anti-inflammatory medications, home or formal therapy exercises, up to 2 steroid injections or surgical release  While majority of patients do respond to conservative treatment, up to 20% may require surgical release  The patient has elected release of the trigger finger  The patient has elected to undergo a release of the A1 pulley (trigger finger)  A small incision will be made over the palmar aspect of the hand, the tendon sheath holding the flexor tendons will be released  In the postoperative period, light activities are allowed immediately, driving is allowed when narcotic medication has stopped, and the incision may get wet after 2 days  Heavy activities (lifting more than approximately 10 pounds) will be allowed after the follow up appointment in 1-2 weeks  While the pain and discomfort within the wrist typically improves rapidly, some residual discomfort may be present for up to 6 weeks  The nodule that is typically palpable in the palmar aspect of the hand will not be removed, as this would necessitate removal of a portion of the flexor tendon, however the catching, clicking, and locking should resolve  Approximate success rate is 98%  The risks and benefits of the procedure were explained to the patient, which include, but are not limited to: Bleeding, infection, recurrence, pain, scar, damage to tendons, damage to nerves, and damage to blood vessels, need for future surgery and complications related to anesthesia  If bony work is done, risks also include malunion and nonunion  These risks, along with alternative conservative treatment options, and postoperative protocols were voiced back and understood by the patient  All questions were answered to the patient's satisfaction  The patient agrees to comply with a standard postoperative protocol, and is willing to proceed  Education was provided via written and auditory forms  There were no barriers to learning  Written handouts regarding wound care, incision and scar care, and general preoperative information, as well as risks and benefits were provided to the patient       ____________________________________________________________________________________________________________________________________________      CHIEF COMPLAINT:  Chief Complaint   Patient presents with    Right Elbow - Pain    Right Middle Finger - Pain    Right Ring Finger - Pain       SUBJECTIVE:  Henri Dorman is a 47y o  year old RHD female who presents to the office today for clicking, locking and catching of he right long and ring finger as well as right elbow pain  Lisa Williamson was referred to the office by her PCP  Lisa Williamson states that her right long and ring fingers have been clicking and locking for 2 months  She also states that she is experiencing pain over her A1 pulley of both of those fingers  She has not had to unlock her finger with her left hand  She also notes right elbow pain that has been ongoing for 1 yr  She denies any injury or trama to her right elbow  She notes that she experiences right elbow pain, only when she is leaning on her elbow  She is not taking anything for pain control  She denies associated numbness or tinging  Pain/symptom timing:  Worse during the day when active  Pain/symptom context:  Worse with activites and work  Pain/symptom modifying factors:  Rest makes better, activities make worse  Pain/symptom associated signs/symptoms: none    Prior treatment   · NSAIDsNo   · Injections No   · Bracing/Orthotics No    Physical Therapy No     I have personally reviewed all the relevant PMH, PSH, SH, FH, Medications and allergies      PAST MEDICAL HISTORY:  Past Medical History:   Diagnosis Date    Headache        PAST SURGICAL HISTORY:  History reviewed  No pertinent surgical history  FAMILY HISTORY:  Family History   Problem Relation Age of Onset    Seizures Sister     Lupus Sister        SOCIAL HISTORY:  Social History     Tobacco Use    Smoking status: Current Every Day Smoker     Packs/day: 0 50     Years: 25 00     Pack years: 12 50    Smokeless tobacco: Never Used   Substance Use Topics    Alcohol use: Yes     Comment: occasionally    Drug use: No       MEDICATIONS:    Current Outpatient Medications:     clotrimazole-betamethasone (LOTRISONE) 1-0 05 % cream, Apply topically 2 (two) times a day, Disp: 45 g, Rfl: 0    multivitamin (THERAGRAN) TABS, Take 1 tablet by mouth, Disp: , Rfl:     PARoxetine (PAXIL-CR) 12 5 mg 24 hr tablet, TAKE 1 TABLET DAILY  , Disp: 90 tablet, Rfl: 1    triamcinolone (KENALOG) 0 1 % cream, Apply topically 2 (two) times a day, Disp: 80 g, Rfl: 0    ALLERGIES:  Allergies   Allergen Reactions    Azithromycin Nausea Only    Iodine Edema and Swelling     Reaction Date: [id number];            REVIEW OF SYSTEMS:  Review of Systems   Constitutional: Negative for chills, fever and unexpected weight change  HENT: Negative for hearing loss, nosebleeds and sore throat  Eyes: Negative for pain, redness and visual disturbance  Respiratory: Negative for cough, shortness of breath and wheezing  Cardiovascular: Negative for chest pain, palpitations and leg swelling     Gastrointestinal: Negative for abdominal pain, nausea and vomiting  Endocrine: Negative for polydipsia and polyuria  Genitourinary: Negative for difficulty urinating and hematuria  Musculoskeletal: Negative for arthralgias, joint swelling and myalgias  Skin: Negative for rash and wound  Neurological: Negative for dizziness, numbness and headaches  Psychiatric/Behavioral: Negative for decreased concentration, dysphoric mood and suicidal ideas  The patient is not nervous/anxious  VITALS:  Vitals:    03/20/19 1654   BP: 118/77   Pulse: 73       LABS:  HgA1c: No results found for: HGBA1C  BMP:   Lab Results   Component Value Date    CALCIUM 9 2 05/02/2017     05/02/2017    K 4 0 05/02/2017    CO2 26 05/02/2017     05/02/2017    BUN 18 05/02/2017    CREATININE 0 68 05/02/2017       _____________________________________________________  PHYSICAL EXAMINATION:  General: well developed and well nourished, alert, oriented times 3 and appears comfortable  Psychiatric: Normal  HEENT: Normocephalic, Atraumatic Trachea Midline, No torticollis  Pulmonary: No audible wheezing or respiratory distress   Cardiovascular: No pitting edema, 2+ radial pulse   Skin: No masses, erythema, lacerations, fluctation, ulcerations  Neurovascular: Sensation Intact to the Median, Ulnar, Radial Nerve, Motor Intact to the Median, Ulnar, Radial Nerve and Pulses Intact  Musculoskeletal: Normal, except as noted in detailed exam and in HPI  MUSCULOSKELETAL EXAMINATION:    right long finger:  Positive palpable nodule over the A1 pulley  Positive tenderness to palpation over A1 pulley  Negative catching on exam  Positive clicking  right ring finger:  Positive palpable nodule over the A1 pulley  Positive tenderness to palpation over A1 pulley  Negative catching on exam  Positive clicking        Right elbow:  No erythema  No ecchymosis   No edema  Thickening over bursa, sightly tender to palpation   Normal ROM ___________________________________________________  STUDIES REVIEWED:  I have personally reviewed AP lateral and oblique radiographs of right elbow without evidence of traction osteophyte or soft tissue mass          PROCEDURES PERFORMED:  Hand/upper extremity injection: R long A1  Date/Time: 3/20/2019 5:21 PM  Consent given by: patient  Site marked: site marked  Timeout: Immediately prior to procedure a time out was called to verify the correct patient, procedure, equipment, support staff and site/side marked as required   Supporting Documentation  Indications: pain   Procedure Details  Condition:trigger finger Location: long finger - R long A1   Preparation: Patient was prepped and draped in the usual sterile fashion  Needle size: 25 G  Ultrasound guidance: no  Medications administered: 1 mL lidocaine (PF) 2 %; 6 mg betamethasone acetate-betamethasone sodium phosphate 6 (3-3) mg/mL    Patient tolerance: patient tolerated the procedure well with no immediate complications  Dressing:  Sterile dressing applied     Hand/upper extremity injection: R ring A1  Date/Time: 3/20/2019 5:22 PM  Consent given by: patient  Site marked: site marked  Timeout: Immediately prior to procedure a time out was called to verify the correct patient, procedure, equipment, support staff and site/side marked as required   Supporting Documentation  Indications: pain   Procedure Details  Condition:trigger finger Location: ring finger - R ring A1   Preparation: Patient was prepped and draped in the usual sterile fashion  Needle size: 25 G  Ultrasound guidance: no  Medications administered: 1 mL lidocaine (PF) 2 %; 6 mg betamethasone acetate-betamethasone sodium phosphate 6 (3-3) mg/mL    Patient tolerance: patient tolerated the procedure well with no immediate complications  Dressing:  Sterile dressing applied            _____________________________________________________      Scribe Attestation    I,:   Min Gordon am acting as a scribe while in the presence of the attending physician :        I,:   Eric Foote MD personally performed the services described in this documentation    as scribed in my presence :

## 2019-05-12 ENCOUNTER — HOSPITAL ENCOUNTER (EMERGENCY)
Facility: HOSPITAL | Age: 54
Discharge: HOME | End: 2019-05-12
Attending: EMERGENCY MEDICINE
Payer: COMMERCIAL

## 2019-05-12 ENCOUNTER — APPOINTMENT (EMERGENCY)
Dept: RADIOLOGY | Facility: HOSPITAL | Age: 54
End: 2019-05-12
Payer: COMMERCIAL

## 2019-05-12 VITALS
RESPIRATION RATE: 16 BRPM | HEART RATE: 73 BPM | SYSTOLIC BLOOD PRESSURE: 118 MMHG | WEIGHT: 132.72 LBS | BODY MASS INDEX: 25.08 KG/M2 | OXYGEN SATURATION: 97 % | DIASTOLIC BLOOD PRESSURE: 92 MMHG | TEMPERATURE: 97.4 F

## 2019-05-12 DIAGNOSIS — S67.22XA CRUSHING INJURY OF LEFT HAND, INITIAL ENCOUNTER: Primary | ICD-10-CM

## 2019-05-12 PROCEDURE — 99283 EMERGENCY DEPT VISIT LOW MDM: CPT | Performed by: EMERGENCY MEDICINE

## 2019-05-12 PROCEDURE — 99283 EMERGENCY DEPT VISIT LOW MDM: CPT

## 2019-05-12 PROCEDURE — 73130 X-RAY EXAM OF HAND: CPT

## 2019-06-07 ENCOUNTER — OFFICE VISIT (OUTPATIENT)
Dept: OBGYN CLINIC | Facility: HOSPITAL | Age: 54
End: 2019-06-07
Payer: COMMERCIAL

## 2019-06-07 VITALS
SYSTOLIC BLOOD PRESSURE: 118 MMHG | WEIGHT: 130 LBS | DIASTOLIC BLOOD PRESSURE: 77 MMHG | HEIGHT: 61 IN | HEART RATE: 76 BPM | BODY MASS INDEX: 24.55 KG/M2

## 2019-06-07 DIAGNOSIS — M70.21 OLECRANON BURSITIS OF RIGHT ELBOW: ICD-10-CM

## 2019-06-07 DIAGNOSIS — M65.331 TRIGGER FINGER, RIGHT MIDDLE FINGER: Primary | ICD-10-CM

## 2019-06-07 DIAGNOSIS — M65.341 TRIGGER FINGER, RIGHT RING FINGER: ICD-10-CM

## 2019-06-07 DIAGNOSIS — S67.22XD CRUSHING INJURY OF LEFT HAND, SUBSEQUENT ENCOUNTER: ICD-10-CM

## 2019-06-07 PROCEDURE — 99214 OFFICE O/P EST MOD 30 MIN: CPT | Performed by: SURGERY

## 2019-06-07 RX ORDER — CEFAZOLIN SODIUM 1 G/50ML
1000 SOLUTION INTRAVENOUS ONCE
Status: CANCELLED | OUTPATIENT
Start: 2019-06-07 | End: 2019-06-07

## 2019-07-19 ENCOUNTER — TELEPHONE (OUTPATIENT)
Dept: OBGYN CLINIC | Facility: HOSPITAL | Age: 54
End: 2019-07-19

## 2019-07-19 NOTE — TELEPHONE ENCOUNTER
Called and lm for patient to call me back and schedule her sx with Dr Johnathan Bernal  Left my direct line      Thanks

## 2019-08-09 ENCOUNTER — OFFICE VISIT (OUTPATIENT)
Dept: OBGYN CLINIC | Facility: HOSPITAL | Age: 54
End: 2019-08-09
Payer: COMMERCIAL

## 2019-08-09 VITALS
BODY MASS INDEX: 23.98 KG/M2 | HEIGHT: 61 IN | SYSTOLIC BLOOD PRESSURE: 109 MMHG | DIASTOLIC BLOOD PRESSURE: 73 MMHG | WEIGHT: 127 LBS | HEART RATE: 77 BPM

## 2019-08-09 DIAGNOSIS — M65.341 TRIGGER FINGER, RIGHT RING FINGER: ICD-10-CM

## 2019-08-09 DIAGNOSIS — M65.331 TRIGGER FINGER, RIGHT MIDDLE FINGER: Primary | ICD-10-CM

## 2019-08-09 PROCEDURE — 99213 OFFICE O/P EST LOW 20 MIN: CPT | Performed by: SURGERY

## 2019-08-09 NOTE — PROGRESS NOTES
ASSESSMENT/PLAN:      47 y o  female with right long and ring finger trigger fingers  Right long and ring finger trigger finger releases were discussed at length today  Gaby expressed interest in a percutaneous release, which I do not perform  I explained to Olivia Tianna today that I do not perform the percutaneous release as there is a chance of incompliant release  If Olivia Wynn wishes to proceed with open trigger finger release's I would recommend that she takes 2 week off of work and leave her surgical dressings on until she is seen back int he office 10-14 days afterwards  Olivia Wynn will discuss her options with her   She will give the office a call and come in to sign surgical consent if she wishes to proceed with surgery  The patient verbalized understanding of exam findings and treatment plan  We engaged in the shared decision-making process and treatment options were discussed at length with the patient  Surgical and conservative management discussed today along with risks and benefits  Diagnoses and all orders for this visit:    Trigger finger, right middle finger    Trigger finger, right ring finger      Trigger Finger Release: The anatomy and physiology of trigger finger was discussed with the patient today in the office  Edema and increased contact pressure within the flexor tendons at the A1 pulley can cause pain, crepitation, and limitation of function  Treatment options include resting MP blocking splints to decrease edema, oral anti-inflammatory medications, home or formal therapy exercises, up to 2 steroid injections or surgical release  While majority of patients do respond to conservative treatment, up to 20% may require surgical release  The patient has elected release of the trigger finger  The patient has elected to undergo a release of the A1 pulley (trigger finger)    A small incision will be made over the palmar aspect of the hand, the tendon sheath holding the flexor tendons will be released  In the postoperative period, light activities are allowed immediately, driving is allowed when narcotic medication has stopped, and the incision may get wet after 2 days  Heavy activities (lifting more than approximately 10 pounds) will be allowed after the follow up appointment in 1-2 weeks  While the pain and discomfort within the wrist typically improves rapidly, some residual discomfort may be present for up to 6 weeks  The nodule that is typically palpable in the palmar aspect of the hand will not be removed, as this would necessitate removal of a portion of the flexor tendon, however the catching, clicking, and locking should resolve  Approximate success rate is 98%  The risks and benefits of the procedure were explained to the patient, which include, but are not limited to: Bleeding, infection, recurrence, pain, scar, damage to tendons, damage to nerves, and damage to blood vessels, need for future surgery and complications related to anesthesia  If bony work is done, risks also include malunion and nonunion  These risks, along with alternative conservative treatment options, and postoperative protocols were voiced back and understood by the patient  All questions were answered to the patient's satisfaction  The patient agrees to comply with a standard postoperative protocol, and is willing to proceed  Education was provided via written and auditory forms  There were no barriers to learning  Written handouts regarding wound care, incision and scar care, and general preoperative information, as well as risks and benefits were provided to the patient  Follow Up:  Return if symptoms worsen or fail to improve      To Do Next Visit:  Re-evaluation of current issue    ____________________________________________________________________________________________________________________________________________      CHIEF COMPLAINT:  Chief Complaint   Patient presents with    Right Hand - Pain       SUBJECTIVE:  Tati Macdonald is a 47y o  year old RHD female who presents to the office today for a follow up regarding a right long and ring ring finger trigger fingers  Debora Calderon states her fingers have been clicking, catching and locking for aprox  1 year  She tried trigger finger CSI in the past without relief  She would like to discuss surgical intervention options  I have personally reviewed all the relevant PMH, PSH, SH, FH, Medications and allergies  PAST MEDICAL HISTORY:  Past Medical History:   Diagnosis Date    Headache        PAST SURGICAL HISTORY:  History reviewed  No pertinent surgical history  FAMILY HISTORY:  Family History   Problem Relation Age of Onset    Seizures Sister     Lupus Sister        SOCIAL HISTORY:  Social History     Tobacco Use    Smoking status: Current Every Day Smoker     Packs/day: 0 50     Years: 25 00     Pack years: 12 50    Smokeless tobacco: Never Used   Substance Use Topics    Alcohol use: Yes     Comment: occasionally    Drug use: No       MEDICATIONS:    Current Outpatient Medications:     clotrimazole-betamethasone (LOTRISONE) 1-0 05 % cream, Apply topically 2 (two) times a day, Disp: 45 g, Rfl: 0    multivitamin (THERAGRAN) TABS, Take 1 tablet by mouth, Disp: , Rfl:     PARoxetine (PAXIL-CR) 12 5 mg 24 hr tablet, TAKE 1 TABLET DAILY  , Disp: 90 tablet, Rfl: 1    triamcinolone (KENALOG) 0 1 % cream, Apply topically 2 (two) times a day, Disp: 80 g, Rfl: 0    ALLERGIES:  Allergies   Allergen Reactions    Azithromycin Nausea Only    Iodine Edema and Swelling     Reaction Date: [id number];        REVIEW OF SYSTEMS:  Review of Systems   Constitutional: Negative for chills, fever and unexpected weight change  HENT: Negative for hearing loss, nosebleeds and sore throat  Eyes: Negative for pain, redness and visual disturbance  Respiratory: Negative for cough, shortness of breath and wheezing      Cardiovascular: Negative for chest pain, palpitations and leg swelling  Gastrointestinal: Negative for abdominal pain, nausea and vomiting  Endocrine: Negative for polydipsia and polyuria  Genitourinary: Negative for difficulty urinating and hematuria  Musculoskeletal: Negative for arthralgias, joint swelling and myalgias  Skin: Negative for rash and wound  Neurological: Negative for dizziness, numbness and headaches  Psychiatric/Behavioral: Negative for decreased concentration, dysphoric mood and suicidal ideas  The patient is not nervous/anxious  VITALS:  Vitals:    08/09/19 1421   BP: 109/73   Pulse: 77       LABS:  HgA1c: No results found for: HGBA1C  BMP:   Lab Results   Component Value Date    CALCIUM 9 2 05/02/2017     05/02/2017    K 4 0 05/02/2017    CO2 26 05/02/2017     05/02/2017    BUN 18 05/02/2017    CREATININE 0 68 05/02/2017       _____________________________________________________  PHYSICAL EXAMINATION:  General: well developed and well nourished, alert, oriented times 3 and appears comfortable  Psychiatric: Normal  HEENT: Normocephalic, Atraumatic Trachea Midline, No torticollis  Pulmonary: No audible wheezing or respiratory distress   Cardiovascular: No pitting edema, 2+ radial pulse   Skin: No masses, erythema, lacerations, fluctation, ulcerations  Neurovascular: Sensation Intact to the Median, Ulnar, Radial Nerve, Motor Intact to the Median, Ulnar, Radial Nerve and Pulses Intact  Musculoskeletal: Normal, except as noted in detailed exam and in HPI  MUSCULOSKELETAL EXAMINATION:    Right long finger:  Positive palpable nodule over the A1 pulley  Positive tenderness to palpation over A1 pulley  Positive catching  Positive clicking  Full composite fist  Sensation intact  Right ring finger:  Positive palpable nodule over the A1 pulley  Positive tenderness to palpation over A1 pulley  Positive catching  Positive clicking  Full composite fist  Sensation intact  ___________________________________________________  STUDIES REVIEWED:  No new imaging to review            PROCEDURES PERFORMED:  Procedures  No Procedures performed today    _____________________________________________________      Matt Kenton    I,:   Bert Gordon am acting as a scribe while in the presence of the attending physician :        I,:   Betito Jackson MD personally performed the services described in this documentation    as scribed in my presence :

## 2019-09-29 DIAGNOSIS — F32.A DEPRESSION, UNSPECIFIED DEPRESSION TYPE: ICD-10-CM

## 2019-09-29 RX ORDER — PAROXETINE HYDROCHLORIDE 12.5 MG/1
TABLET, FILM COATED, EXTENDED RELEASE ORAL
Qty: 90 TABLET | Refills: 1 | Status: SHIPPED | OUTPATIENT
Start: 2019-09-29 | End: 2020-03-02

## 2019-12-03 ENCOUNTER — HOSPITAL ENCOUNTER (EMERGENCY)
Facility: HOSPITAL | Age: 54
Discharge: HOME | End: 2019-12-03
Attending: EMERGENCY MEDICINE
Payer: OTHER MISCELLANEOUS

## 2019-12-03 VITALS
HEART RATE: 77 BPM | OXYGEN SATURATION: 98 % | RESPIRATION RATE: 16 BRPM | SYSTOLIC BLOOD PRESSURE: 124 MMHG | WEIGHT: 126.98 LBS | DIASTOLIC BLOOD PRESSURE: 86 MMHG | TEMPERATURE: 98 F | BODY MASS INDEX: 23.99 KG/M2

## 2019-12-03 DIAGNOSIS — M65.30 TRIGGER FINGER, RIGHT: Primary | ICD-10-CM

## 2019-12-03 PROCEDURE — 99284 EMERGENCY DEPT VISIT MOD MDM: CPT | Performed by: EMERGENCY MEDICINE

## 2019-12-03 PROCEDURE — 99283 EMERGENCY DEPT VISIT LOW MDM: CPT

## 2019-12-03 NOTE — DISCHARGE INSTRUCTIONS
Continue symptomatic management with Tylenol, Motrin, icing the affected digit, passive stretching  Follow up with occupational medicine at [facility]  Additionally we have provided information to follow up with Orthopedic surgery for further evaluation

## 2019-12-03 NOTE — ED ATTENDING ATTESTATION
12/3/2019  IOlvin MD, saw and evaluated the patient  I have discussed the patient with the resident/non-physician practitioner and agree with the resident's/non-physician practitioner's findings, Plan of Care, and MDM as documented in the resident's/non-physician practitioner's note, except where noted  All available labs and Radiology studies were reviewed  I was present for key portions of any procedure(s) performed by the resident/non-physician practitioner and I was immediately available to provide assistance  At this point I agree with the current assessment done in the Emergency Department  I have conducted an independent evaluation of this patient a history and physical is as follows:    ED Course     The patient presents for evaluation due to left 3rd finger pain that she states is locked  Patient states that she has had previous symptoms in the past but is able to slowly extend finger usually  Patient is a  and states that she requires to flex finger frequently  Patient has never been treated for this issue despite having 6 months of symptoms  No additional complaints  Exam: AAOx3, NAD, left 3rd finger in flexion, no erythema or swelling no motor/sensory deficits  A/P:  Trigger finger  Will recommend follow-up with Hand surgery and occupational medicine      Critical Care Time  Procedures

## 2019-12-04 NOTE — ED PROVIDER NOTES
History  Chief Complaint   Patient presents with    Finger Pain     Pt c/o left middle finger pain and states it is "locked down" and tight  Pt uses hands a lot at work  Denies injury to finger  HPI  Patient is a 17-year-old female presenting for evaluation of pain and decreased range of motion of her left 3rd digit  Patient states that she was working as a  earlier in the day, was squeezing a bag when her finger became stuck in flexion  Patient states that she has been unable to stretch and extend this digit  Patient denies trauma to the digit  Patient denies spasm in any other part of her body  Patient states that she has been having similar episodes in the affected digit for the last 6 months but has not seeked further evaluation  Prior to Admission Medications   Prescriptions Last Dose Informant Patient Reported? Taking? PARoxetine (PAXIL-CR) 12 5 mg 24 hr tablet   No No   Sig: TAKE 1 TABLET DAILY  clotrimazole-betamethasone (LOTRISONE) 1-0 05 % cream   No No   Sig: Apply topically 2 (two) times a day   multivitamin (THERAGRAN) TABS   Yes No   Sig: Take 1 tablet by mouth   triamcinolone (KENALOG) 0 1 % cream   No No   Sig: Apply topically 2 (two) times a day      Facility-Administered Medications: None       Past Medical History:   Diagnosis Date    Headache        No past surgical history on file  Family History   Problem Relation Age of Onset    Seizures Sister     Lupus Sister      I have reviewed and agree with the history as documented  Social History     Tobacco Use    Smoking status: Current Every Day Smoker     Packs/day: 0 50     Years: 25 00     Pack years: 12 50    Smokeless tobacco: Never Used   Substance Use Topics    Alcohol use: Yes     Comment: occasionally    Drug use: No        Review of Systems   Constitutional: Negative for chills, fatigue and fever  HENT: Negative for hearing loss  Eyes: Negative for visual disturbance     Respiratory: Negative for cough, chest tightness and shortness of breath  Cardiovascular: Negative for chest pain  Gastrointestinal: Negative for abdominal distention, abdominal pain, constipation, diarrhea, nausea and vomiting  Endocrine: Negative for polydipsia and polyuria  Genitourinary: Negative for dysuria and hematuria  Musculoskeletal: Positive for arthralgias (left third digit)  Skin: Negative for color change and rash  Neurological: Negative for dizziness and headaches  Psychiatric/Behavioral: Negative for confusion  Physical Exam  ED Triage Vitals   Temperature Pulse Respirations Blood Pressure SpO2   12/03/19 1650 12/03/19 1649 12/03/19 1649 12/03/19 1649 12/03/19 1649   98 °F (36 7 °C) 77 16 124/86 98 %      Temp src Heart Rate Source Patient Position - Orthostatic VS BP Location FiO2 (%)   -- -- -- -- --             Pain Score       --                    Orthostatic Vital Signs  Vitals:    12/03/19 1649   BP: 124/86   Pulse: 77       Physical Exam   Constitutional: She is oriented to person, place, and time  She appears well-developed and well-nourished  No distress  HENT:   Head: Normocephalic and atraumatic  Right Ear: External ear normal    Left Ear: External ear normal    Nose: Nose normal    Mouth/Throat: Oropharynx is clear and moist  No oropharyngeal exudate  Eyes: Pupils are equal, round, and reactive to light  Neck: Normal range of motion  Cardiovascular: Normal rate, regular rhythm and normal heart sounds  Exam reveals no gallop and no friction rub  No murmur heard  Pulmonary/Chest: Effort normal and breath sounds normal  No respiratory distress  She has no wheezes  She exhibits no tenderness  Abdominal: Soft  Bowel sounds are normal  She exhibits no distension and no mass  There is no tenderness  There is no guarding  Musculoskeletal: Normal range of motion  She exhibits no edema or deformity     Left 3rd digit in flexion, easily able to passively extend without significant pain, no tenderness on examination, no erythema, no swelling, no palpable bony deformity or nodules  Lymphadenopathy:     She has no cervical adenopathy  Neurological: She is alert and oriented to person, place, and time  Skin: Skin is warm and dry  Capillary refill takes less than 2 seconds  She is not diaphoretic  Psychiatric: She has a normal mood and affect  Vitals reviewed  ED Medications  Medications - No data to display    Diagnostic Studies  Results Reviewed     None                 No orders to display         Procedures  Procedures      ED Course                               MDM  Number of Diagnoses or Management Options  Trigger finger, right:   Diagnosis management comments: 63-year-old female presenting for trigger finger of her left 3rd digit, provided with ice, instructions to use NSAIDs at home, passive stretching, referral to occupational medicine and orthopedic surgery for further evaluation  Amount and/or Complexity of Data Reviewed  Decide to obtain previous medical records or to obtain history from someone other than the patient: yes  Review and summarize past medical records: yes  Independent visualization of images, tracings, or specimens: yes    Risk of Complications, Morbidity, and/or Mortality  Presenting problems: low  Diagnostic procedures: minimal  Management options: minimal    Patient Progress  Patient progress: improved        Disposition  Final diagnoses:   Trigger finger, right     Time reflects when diagnosis was documented in both MDM as applicable and the Disposition within this note     Time User Action Codes Description Comment    12/3/2019  5:40 PM Latha Lopez Add [M65 30] Trigger finger, right       ED Disposition     ED Disposition Condition Date/Time Comment    Discharge Stable Tue Dec 3, 2019  5:40 PM Noy Cadena discharge to home/self care              Follow-up Information     Follow up With Specialties Details Why Contact Info Additional Information    Romana Breaux MD [facility]   [address] [facility] [address]       [address] [facility]   [address] [id number] [address], [address] [address] [phone]           Discharge Medication List as of 12/3/2019  5:43 PM      CONTINUE these medications which have NOT CHANGED    Details   clotrimazole-betamethasone (LOTRISONE) 1-0 05 % cream Apply topically 2 (two) times a day, Starting Thu 10/18/2018, Print      multivitamin (THERAGRAN) TABS Take 1 tablet by mouth, Historical Med      PARoxetine (PAXIL-CR) 12 5 mg 24 hr tablet TAKE 1 TABLET DAILY , Normal      triamcinolone (KENALOG) 0 1 % cream Apply topically 2 (two) times a day, Starting Mon 10/1/2018, Normal           No discharge procedures on file  ED Provider  Attending physically available and evaluated Ramon James I managed the patient along with the ED Attending      Electronically Signed by         Roel Rodriguez MD  12/04/19 1002

## 2019-12-10 ENCOUNTER — CLINICAL SUPPORT (OUTPATIENT)
Dept: FAMILY MEDICINE CLINIC | Facility: CLINIC | Age: 54
End: 2019-12-10
Payer: COMMERCIAL

## 2019-12-10 VITALS — TEMPERATURE: 97.6 F

## 2019-12-10 DIAGNOSIS — Z23 INFLUENZA VACCINE NEEDED: Primary | ICD-10-CM

## 2019-12-10 PROCEDURE — 90682 RIV4 VACC RECOMBINANT DNA IM: CPT

## 2019-12-10 PROCEDURE — 90471 IMMUNIZATION ADMIN: CPT

## 2019-12-11 ENCOUNTER — APPOINTMENT (OUTPATIENT)
Dept: URGENT CARE | Age: 54
End: 2019-12-11
Payer: OTHER MISCELLANEOUS

## 2019-12-11 ENCOUNTER — APPOINTMENT (OUTPATIENT)
Dept: RADIOLOGY | Age: 54
End: 2019-12-11
Attending: PHYSICIAN ASSISTANT
Payer: OTHER MISCELLANEOUS

## 2019-12-11 DIAGNOSIS — M79.641 RIGHT HAND PAIN: Primary | ICD-10-CM

## 2019-12-11 DIAGNOSIS — M79.641 RIGHT HAND PAIN: ICD-10-CM

## 2019-12-11 PROCEDURE — 99213 OFFICE O/P EST LOW 20 MIN: CPT | Performed by: PHYSICIAN ASSISTANT

## 2019-12-11 PROCEDURE — 73130 X-RAY EXAM OF HAND: CPT

## 2019-12-18 ENCOUNTER — OFFICE VISIT (OUTPATIENT)
Dept: OBGYN CLINIC | Facility: CLINIC | Age: 54
End: 2019-12-18
Payer: OTHER MISCELLANEOUS

## 2019-12-18 VITALS
BODY MASS INDEX: 23.79 KG/M2 | SYSTOLIC BLOOD PRESSURE: 158 MMHG | HEART RATE: 85 BPM | HEIGHT: 61 IN | DIASTOLIC BLOOD PRESSURE: 82 MMHG | WEIGHT: 126 LBS

## 2019-12-18 DIAGNOSIS — M65.331 TRIGGER FINGER, RIGHT MIDDLE FINGER: Primary | ICD-10-CM

## 2019-12-18 DIAGNOSIS — M65.341 TRIGGER FINGER, RIGHT RING FINGER: ICD-10-CM

## 2019-12-18 PROCEDURE — 99214 OFFICE O/P EST MOD 30 MIN: CPT | Performed by: SURGERY

## 2019-12-18 NOTE — PROGRESS NOTES
ASSESSMENT/PLAN:      47 y o  female with right long and ring finger trigger fingers  Right long and ring finger trigger finger releases were discussed at length today  I would recommend proceeding with the ring finger trigger finger release darcy to increased swelling from undergoing a long finger trigger finger release would could make her symptoms worse  Right long and ring finger trigger finger release surgical consent was signed in the office today  I estimate that she will be out of work for aprox  2 weeks following surgery as she is a   Follow up 10-14 days following surgery for suture removal      She was advised to d/c the use of her finger splint as it will cause stiffness, she was provided with Co-Ban to be wrapped around her PIP joint as needed to avoid triggering  The patient verbalized understanding of exam findings and treatment plan  We engaged in the shared decision-making process and treatment options were discussed at length with the patient  Surgical and conservative management discussed today along with risks and benefits  Diagnoses and all orders for this visit:    Trigger finger, right middle finger    Trigger finger, right ring finger      Trigger Finger Release: The anatomy and physiology of trigger finger was discussed with the patient today in the office  Edema and increased contact pressure within the flexor tendons at the A1 pulley can cause pain, crepitation, and limitation of function  Treatment options include resting MP blocking splints to decrease edema, oral anti-inflammatory medications, home or formal therapy exercises, up to 2 steroid injections or surgical release  While majority of patients do respond to conservative treatment, up to 20% may require surgical release  The patient has elected release of the trigger finger  The patient has elected to undergo a release of the A1 pulley (trigger finger)    A small incision will be made over the palmar aspect of the hand, the tendon sheath holding the flexor tendons will be released  In the postoperative period, light activities are allowed immediately, driving is allowed when narcotic medication has stopped, and the incision may get wet after 2 days  Heavy activities (lifting more than approximately 10 pounds) will be allowed after the follow up appointment in 1-2 weeks  While the pain and discomfort within the wrist typically improves rapidly, some residual discomfort may be present for up to 6 weeks  The nodule that is typically palpable in the palmar aspect of the hand will not be removed, as this would necessitate removal of a portion of the flexor tendon, however the catching, clicking, and locking should resolve  Approximate success rate is 98%  The risks and benefits of the procedure were explained to the patient, which include, but are not limited to: Bleeding, infection, recurrence, pain, scar, damage to tendons, damage to nerves, and damage to blood vessels, need for future surgery and complications related to anesthesia  If bony work is done, risks also include malunion and nonunion  These risks, along with alternative conservative treatment options, and postoperative protocols were voiced back and understood by the patient  All questions were answered to the patient's satisfaction  The patient agrees to comply with a standard postoperative protocol, and is willing to proceed  Education was provided via written and auditory forms  There were no barriers to learning  Written handouts regarding wound care, incision and scar care, and general preoperative information, as well as risks and benefits were provided to the patient  Follow Up:  Return 10-14 days after surgery         To Do Next Visit:  Re-evaluation of current issue and Sutures out    ____________________________________________________________________________________________________________________________________________      CHIEF COMPLAINT:  Chief Complaint   Patient presents with    Right Hand - Follow-up       SUBJECTIVE:  Jackie Abel is a 47y o  year old RHD female who presents to the office today for right long finger clicking catching and locking  Antonella Ramirez states that on 12/03/19 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 right long finger was locked in flexion  She notes that since then, she has experienced more episodes of locking with gripping activates  She also states that her ring finger is clicking with gripping activities or flexion of her finger  Antonella Ramirez was seen in the office on 03/20/19 where she elected to proceed with right ring and long finger trigger finger CSI's  She notes locking more frequently at work  I have personally reviewed all the relevant PMH, PSH, SH, FH, Medications and allergies  PAST MEDICAL HISTORY:  Past Medical History:   Diagnosis Date    Headache        PAST SURGICAL HISTORY:  History reviewed  No pertinent surgical history  FAMILY HISTORY:  Family History   Problem Relation Age of Onset    Seizures Sister     Lupus Sister        SOCIAL HISTORY:  Social History     Tobacco Use    Smoking status: Current Every Day Smoker     Packs/day: 0 50     Years: 25 00     Pack years: 12 50    Smokeless tobacco: Never Used   Substance Use Topics    Alcohol use: Yes     Comment: occasionally    Drug use: No       MEDICATIONS:    Current Outpatient Medications:     clotrimazole-betamethasone (LOTRISONE) 1-0 05 % cream, Apply topically 2 (two) times a day, Disp: 45 g, Rfl: 0    multivitamin (THERAGRAN) TABS, Take 1 tablet by mouth, Disp: , Rfl:     PARoxetine (PAXIL-CR) 12 5 mg 24 hr tablet, TAKE 1 TABLET DAILY  , Disp: 90 tablet, Rfl: 1    triamcinolone (KENALOG) 0 1 % cream, Apply topically 2 (two) times a day, Disp: 80 g, Rfl: 0    ALLERGIES:  Allergies   Allergen Reactions    Azithromycin Nausea Only    Iodine Edema and Swelling     Reaction Date: [id number];        REVIEW OF SYSTEMS:  Review of Systems   Constitutional: Negative for chills, fever and unexpected weight change  HENT: Negative for hearing loss, nosebleeds and sore throat  Eyes: Negative for pain, redness and visual disturbance  Respiratory: Negative for cough, shortness of breath and wheezing  Cardiovascular: Negative for chest pain, palpitations and leg swelling  Gastrointestinal: Negative for abdominal pain, nausea and vomiting  Endocrine: Negative for polydipsia and polyuria  Genitourinary: Negative for difficulty urinating and hematuria  Musculoskeletal: Negative for arthralgias, joint swelling and myalgias  Skin: Negative for rash and wound  Neurological: Negative for dizziness, numbness and headaches  Psychiatric/Behavioral: Negative for decreased concentration, dysphoric mood and suicidal ideas  The patient is not nervous/anxious  VITALS:  Vitals:    12/18/19 1433   BP: 158/82   Pulse: 85       LABS:  HgA1c: No results found for: HGBA1C  BMP:   Lab Results   Component Value Date    CALCIUM 9 2 05/02/2017     05/02/2017    K 4 0 05/02/2017    CO2 26 05/02/2017     05/02/2017    BUN 18 05/02/2017    CREATININE 0 68 05/02/2017       _____________________________________________________  PHYSICAL EXAMINATION:  General: well developed and well nourished, alert, oriented times 3 and appears comfortable  Psychiatric: Normal  HEENT: Normocephalic, Atraumatic Trachea Midline, No torticollis  Pulmonary: No audible wheezing or respiratory distress   Cardiovascular: No pitting edema, 2+ radial pulse   Skin: No masses, erythema, lacerations, fluctation, ulcerations  Neurovascular: Sensation Intact to the Median, Ulnar, Radial Nerve, Motor Intact to the Median, Ulnar, Radial Nerve and Pulses Intact  Musculoskeletal: Normal, except as noted in detailed exam and in HPI  MUSCULOSKELETAL EXAMINATION:    right long finger:  Positive palpable nodule over the A1 pulley    Positive tenderness to palpation over A1 pulley  Positive catching  Positive clicking  right ring finger:  Positive palpable nodule over the A1 pulley  Positive tenderness to palpation over A1 pulley  Negative catching   Positive clicking       ___________________________________________________  STUDIES REVIEWED:  No new imaging to review           PROCEDURES PERFORMED:  Procedures  No Procedures performed today    _____________________________________________________      Codi Shea    I,:   Kirstie Gordon am acting as a scribe while in the presence of the attending physician :        I,:   Christin Dominguez MD personally performed the services described in this documentation    as scribed in my presence :

## 2019-12-18 NOTE — LETTER
December 18, 2019     Patient: Jocelyn Hendrickson   YOB: 1965   Date of Visit: 12/18/2019       To Whom it May Concern:    Izabela Anderson is under my professional care  She was seen in my office on 12/18/2019  OT will not be beneficial prior to surgical intervention  She may be require to attend OT following surgical intervention for ROM exercises  If you have any questions or concerns, please don't hesitate to call           Sincerely,          Christin Dominguez MD

## 2019-12-18 NOTE — H&P
ASSESSMENT/PLAN:      47 y o  female with right long and ring finger trigger fingers  Right long and ring finger trigger finger releases were discussed at length today  I would recommend proceeding with the ring finger trigger finger release darcy to increased swelling from undergoing a long finger trigger finger release would could make her symptoms worse  Right long and ring finger trigger finger release surgical consent was signed in the office today  I estimate that she will be out of work for aprox  2 weeks following surgery as she is a   Follow up 10-14 days following surgery for suture removal      She was advised to d/c the use of her finger splint as it will cause stiffness, she was provided with Co-Ban to be wrapped around her PIP joint as needed to avoid triggering  The patient verbalized understanding of exam findings and treatment plan  We engaged in the shared decision-making process and treatment options were discussed at length with the patient  Surgical and conservative management discussed today along with risks and benefits  Diagnoses and all orders for this visit:    Trigger finger, right middle finger    Trigger finger, right ring finger      Trigger Finger Release: The anatomy and physiology of trigger finger was discussed with the patient today in the office  Edema and increased contact pressure within the flexor tendons at the A1 pulley can cause pain, crepitation, and limitation of function  Treatment options include resting MP blocking splints to decrease edema, oral anti-inflammatory medications, home or formal therapy exercises, up to 2 steroid injections or surgical release  While majority of patients do respond to conservative treatment, up to 20% may require surgical release  The patient has elected release of the trigger finger  The patient has elected to undergo a release of the A1 pulley (trigger finger)    A small incision will be made over the palmar aspect of the hand, the tendon sheath holding the flexor tendons will be released  In the postoperative period, light activities are allowed immediately, driving is allowed when narcotic medication has stopped, and the incision may get wet after 2 days  Heavy activities (lifting more than approximately 10 pounds) will be allowed after the follow up appointment in 1-2 weeks  While the pain and discomfort within the wrist typically improves rapidly, some residual discomfort may be present for up to 6 weeks  The nodule that is typically palpable in the palmar aspect of the hand will not be removed, as this would necessitate removal of a portion of the flexor tendon, however the catching, clicking, and locking should resolve  Approximate success rate is 98%  The risks and benefits of the procedure were explained to the patient, which include, but are not limited to: Bleeding, infection, recurrence, pain, scar, damage to tendons, damage to nerves, and damage to blood vessels, need for future surgery and complications related to anesthesia  If bony work is done, risks also include malunion and nonunion  These risks, along with alternative conservative treatment options, and postoperative protocols were voiced back and understood by the patient  All questions were answered to the patient's satisfaction  The patient agrees to comply with a standard postoperative protocol, and is willing to proceed  Education was provided via written and auditory forms  There were no barriers to learning  Written handouts regarding wound care, incision and scar care, and general preoperative information, as well as risks and benefits were provided to the patient  Follow Up:  Return 10-14 days after surgery         To Do Next Visit:  Re-evaluation of current issue and Sutures out    ____________________________________________________________________________________________________________________________________________      CHIEF COMPLAINT:  Chief Complaint   Patient presents with    Right Hand - Follow-up       SUBJECTIVE:  Marquita Litten is a 47y o  year old RHD female who presents to the office today for right long finger clicking catching and locking  Joceline Huizar states that on 12/03/19 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 right long finger was locked in flexion  She notes that since then, she has experienced more episodes of locking with gripping activates  She also states that her ring finger is clicking with gripping activities or flexion of her finger  Joceline Huizar was seen in the office on 03/20/19 where she elected to proceed with right ring and long finger trigger finger CSI's  She notes locking more frequently at work  I have personally reviewed all the relevant PMH, PSH, SH, FH, Medications and allergies  PAST MEDICAL HISTORY:  Past Medical History:   Diagnosis Date    Headache        PAST SURGICAL HISTORY:  History reviewed  No pertinent surgical history  FAMILY HISTORY:  Family History   Problem Relation Age of Onset    Seizures Sister     Lupus Sister        SOCIAL HISTORY:  Social History     Tobacco Use    Smoking status: Current Every Day Smoker     Packs/day: 0 50     Years: 25 00     Pack years: 12 50    Smokeless tobacco: Never Used   Substance Use Topics    Alcohol use: Yes     Comment: occasionally    Drug use: No       MEDICATIONS:    Current Outpatient Medications:     clotrimazole-betamethasone (LOTRISONE) 1-0 05 % cream, Apply topically 2 (two) times a day, Disp: 45 g, Rfl: 0    multivitamin (THERAGRAN) TABS, Take 1 tablet by mouth, Disp: , Rfl:     PARoxetine (PAXIL-CR) 12 5 mg 24 hr tablet, TAKE 1 TABLET DAILY  , Disp: 90 tablet, Rfl: 1    triamcinolone (KENALOG) 0 1 % cream, Apply topically 2 (two) times a day, Disp: 80 g, Rfl: 0    ALLERGIES:  Allergies   Allergen Reactions    Azithromycin Nausea Only    Iodine Edema and Swelling     Reaction Date: [id number];        REVIEW OF SYSTEMS:  Review of Systems   Constitutional: Negative for chills, fever and unexpected weight change  HENT: Negative for hearing loss, nosebleeds and sore throat  Eyes: Negative for pain, redness and visual disturbance  Respiratory: Negative for cough, shortness of breath and wheezing  Cardiovascular: Negative for chest pain, palpitations and leg swelling  Gastrointestinal: Negative for abdominal pain, nausea and vomiting  Endocrine: Negative for polydipsia and polyuria  Genitourinary: Negative for difficulty urinating and hematuria  Musculoskeletal: Negative for arthralgias, joint swelling and myalgias  Skin: Negative for rash and wound  Neurological: Negative for dizziness, numbness and headaches  Psychiatric/Behavioral: Negative for decreased concentration, dysphoric mood and suicidal ideas  The patient is not nervous/anxious  VITALS:  Vitals:    12/18/19 1433   BP: 158/82   Pulse: 85       LABS:  HgA1c: No results found for: HGBA1C  BMP:   Lab Results   Component Value Date    CALCIUM 9 2 05/02/2017     05/02/2017    K 4 0 05/02/2017    CO2 26 05/02/2017     05/02/2017    BUN 18 05/02/2017    CREATININE 0 68 05/02/2017       _____________________________________________________  PHYSICAL EXAMINATION:  General: well developed and well nourished, alert, oriented times 3 and appears comfortable  Psychiatric: Normal  HEENT: Normocephalic, Atraumatic Trachea Midline, No torticollis  Pulmonary: No audible wheezing or respiratory distress   Cardiovascular: No pitting edema, 2+ radial pulse   Skin: No masses, erythema, lacerations, fluctation, ulcerations  Neurovascular: Sensation Intact to the Median, Ulnar, Radial Nerve, Motor Intact to the Median, Ulnar, Radial Nerve and Pulses Intact  Musculoskeletal: Normal, except as noted in detailed exam and in HPI  MUSCULOSKELETAL EXAMINATION:    right long finger:  Positive palpable nodule over the A1 pulley    Positive tenderness to palpation over A1 pulley  Positive catching  Positive clicking  right ring finger:  Positive palpable nodule over the A1 pulley  Positive tenderness to palpation over A1 pulley  Negative catching   Positive clicking       ___________________________________________________  STUDIES REVIEWED:  No new imaging to review           PROCEDURES PERFORMED:  Procedures  No Procedures performed today    _____________________________________________________      [facility]    I,:   Kristyn Gordon am acting as a scribe while in the presence of the attending physician :        I,:   Glynn Coleman MD personally performed the services described in this documentation    as scribed in my presence :

## 2019-12-30 ENCOUNTER — APPOINTMENT (OUTPATIENT)
Dept: URGENT CARE | Age: 54
End: 2019-12-30
Payer: OTHER MISCELLANEOUS

## 2019-12-30 PROCEDURE — 99213 OFFICE O/P EST LOW 20 MIN: CPT | Performed by: PREVENTIVE MEDICINE

## 2020-02-18 ENCOUNTER — TELEPHONE (OUTPATIENT)
Dept: ADMINISTRATIVE | Facility: OTHER | Age: 55
End: 2020-02-18

## 2020-02-18 NOTE — TELEPHONE ENCOUNTER
----- Message from Tanja Boateng sent at 2/17/2020  3:39 PM EST -----  Regarding: Pap-SVFP  Contact: [phone]  02/17/20 3:40 PM    Hello, our patient Sal Emanuel has had Pap Smear (HPV) aka Cervical Cancer Screening completed/performed  Please assist in updating the patient chart by pulling the Care Everywhere (CE) document  The date of service is 10/11/2019       Thank you,  Richie Spicer MA  [facility]

## 2020-02-18 NOTE — TELEPHONE ENCOUNTER
Upon review of the In Basket request we were able to locate, review, and update the patient chart as requested for Pap Smear (HPV) aka Cervical Cancer Screening  Any additional questions or concerns should be emailed to the Practice Liaisons via [email or web address] email, please do not reply via In Basket      Thank you  Damir Hernandez

## 2020-03-02 ENCOUNTER — OFFICE VISIT (OUTPATIENT)
Dept: FAMILY MEDICINE CLINIC | Facility: CLINIC | Age: 55
End: 2020-03-02
Payer: COMMERCIAL

## 2020-03-02 VITALS
RESPIRATION RATE: 16 BRPM | BODY MASS INDEX: 21.52 KG/M2 | HEART RATE: 79 BPM | TEMPERATURE: 98.2 F | HEIGHT: 61 IN | OXYGEN SATURATION: 98 % | SYSTOLIC BLOOD PRESSURE: 110 MMHG | DIASTOLIC BLOOD PRESSURE: 90 MMHG | WEIGHT: 114 LBS

## 2020-03-02 DIAGNOSIS — R53.83 FATIGUE, UNSPECIFIED TYPE: ICD-10-CM

## 2020-03-02 DIAGNOSIS — E55.9 VITAMIN D DEFICIENCY: ICD-10-CM

## 2020-03-02 DIAGNOSIS — R82.90 ABNORMAL URINE: ICD-10-CM

## 2020-03-02 DIAGNOSIS — G47.00 INSOMNIA, UNSPECIFIED TYPE: ICD-10-CM

## 2020-03-02 DIAGNOSIS — M65.331 TRIGGER FINGER, RIGHT MIDDLE FINGER: ICD-10-CM

## 2020-03-02 DIAGNOSIS — F41.8 DEPRESSION WITH ANXIETY: ICD-10-CM

## 2020-03-02 DIAGNOSIS — Z00.00 WELL ADULT EXAM: Primary | ICD-10-CM

## 2020-03-02 DIAGNOSIS — L98.9 SKIN LESION: ICD-10-CM

## 2020-03-02 DIAGNOSIS — E78.5 DYSLIPIDEMIA: ICD-10-CM

## 2020-03-02 LAB
BACTERIA UR QL AUTO: ABNORMAL /HPF
BILIRUB UR QL STRIP: NEGATIVE
CLARITY UR: CLEAR
COLOR UR: YELLOW
GLUCOSE UR STRIP-MCNC: NEGATIVE MG/DL
HGB UR QL STRIP.AUTO: ABNORMAL
HYALINE CASTS #/AREA URNS LPF: ABNORMAL /LPF
KETONES UR STRIP-MCNC: NEGATIVE MG/DL
LEUKOCYTE ESTERASE UR QL STRIP: ABNORMAL
NITRITE UR QL STRIP: NEGATIVE
NON-SQ EPI CELLS URNS QL MICRO: ABNORMAL /HPF
PH UR STRIP.AUTO: 7 [PH]
PROT UR STRIP-MCNC: NEGATIVE MG/DL
RBC #/AREA URNS AUTO: ABNORMAL /HPF
SL AMB  POCT GLUCOSE, UA: NORMAL
SL AMB LEUKOCYTE ESTERASE,UA: NORMAL
SL AMB POCT BILIRUBIN,UA: NORMAL
SL AMB POCT BLOOD,UA: NORMAL
SL AMB POCT CLARITY,UA: CLEAR
SL AMB POCT COLOR,UA: YELLOW
SL AMB POCT KETONES,UA: NORMAL
SL AMB POCT NITRITE,UA: NORMAL
SL AMB POCT PH,UA: 7
SL AMB POCT SPECIFIC GRAVITY,UA: 1
SL AMB POCT URINE PROTEIN: NORMAL
SL AMB POCT UROBILINOGEN: 0.2
SP GR UR STRIP.AUTO: 1 (ref 1–1.03)
UROBILINOGEN UR QL STRIP.AUTO: 0.2 E.U./DL
WBC #/AREA URNS AUTO: ABNORMAL /HPF

## 2020-03-02 PROCEDURE — 87086 URINE CULTURE/COLONY COUNT: CPT | Performed by: FAMILY MEDICINE

## 2020-03-02 PROCEDURE — 99396 PREV VISIT EST AGE 40-64: CPT | Performed by: FAMILY MEDICINE

## 2020-03-02 PROCEDURE — 81001 URINALYSIS AUTO W/SCOPE: CPT | Performed by: FAMILY MEDICINE

## 2020-03-02 PROCEDURE — 81003 URINALYSIS AUTO W/O SCOPE: CPT | Performed by: FAMILY MEDICINE

## 2020-03-02 RX ORDER — LORAZEPAM 0.5 MG/1
TABLET ORAL
Qty: 30 TABLET | Refills: 1 | Status: SHIPPED | OUTPATIENT
Start: 2020-03-02 | End: 2020-06-04

## 2020-03-02 NOTE — PROGRESS NOTES
FAMILY PRACTICE OFFICE VISIT       NAME: Licha Goyal  AGE: 47 y o  SEX: female       : 1965        MRN: [id number]        Assessment and Plan     Problem List Items Addressed This Visit        Musculoskeletal and Integument    Trigger finger, right middle finger       Other    Depression with anxiety    Relevant Medications    sertraline (ZOLOFT) 50 mg tablet    LORazepam (ATIVAN) 0 5 mg tablet    Dyslipidemia    Relevant Orders    Lipid Panel with Direct LDL reflex    Insomnia    Relevant Medications    LORazepam (ATIVAN) 0 5 mg tablet      Other Visit Diagnoses     Well adult exam    -  Primary    Relevant Orders    POCT urine dip (Completed)    POCT urine dip auto non-scope    Skin lesion        Relevant Orders    Ambulatory referral to Dermatology    Ambulatory referral to Dermatology    Abnormal urine        Relevant Orders    UA (URINE) with reflex to Scope (Completed)    Urine culture (Completed)    Urine Microscopic (Completed)    Urinalysis with microscopic    Vitamin D deficiency        Relevant Orders    Vitamin D 25 hydroxy    Fatigue, unspecified type        Relevant Orders    CBC and differential    Comprehensive metabolic panel    TSH, 3rd generation        Patient presents for annual well exam   Assessment and plan as outlined above  · Patient will proceed with blood work  · She remains under care of hand/orthopedic surgery for treatment of trigger finger  · Patient reports worsening of depression/anxiety symptoms due to family related stress  Will discontinue Paxil CR and switch to Zoloft, patient will use 25 mg daily for 6 days and then will increase dose to 50 mg daily  Trial of lorazepam 0 5 mg q h s  p r n  Insomnia/anxiety  · I advised start of counseling  · I reminded patient to schedule mammogram   · Patient is past due colorectal screening and is planning to proceed  · We discussed importance of tobacco cessation    · Referral to Dermatology for evaluation nonhealing lesion upper forehead  · Urinalysis in the office reveals trace of blood and small leukocytes, will proceed with UA C&S, patient is asymptomatic  · Will schedule follow-up in 6-8 weeks  There are no Patient Instructions on file for this visit  Discussed with the patient and all questioned fully answered  She will call me if any problems arise  M*Modal software was used to dictate this note  It may contain errors with dictating incorrect words/spelling  Please contact provider directly with any questions  Chief Complaint     Chief Complaint   Patient presents with    Annual Exam     Pt is here for physical exam       History of Present Illness     Patient presents for annual well exam   She is following up for treatment of right 3rd trigger finger at [address] [place] [address]  Patient complains of chronic fatigue and arthralgias mainly related to overuse  Patient has been under lot of stress lately, family related  She reports worsening of symptoms of anxiety and depression, nervousness and tension  She denies suicidal homicidal ideation or thought  She feels discouraged and sad  Patient has been using Paxil CR 12 5 mg daily for many years  We have attempted to increase dose of medication in the past which has caused side effects of fatigue  Patient states that she has trouble falling and staying asleep, chronic insomnia is contributing to daytime fatigue  Patient sleeps for few hours at a time  Patient is still unfortunately smoking 13 cigarettes daily  Patient is up-to-date with gyn exam   Unfortunately, she has not scheduled her mammogram or colorectal screening so far  Patient would like to proceed with complete blood work including vitamin-D  She denies symptoms of abdominal pain, nausea, vomiting dysuria bright red blood per rectum  No chest pain, palpitations, shortness of breath or dizziness          Non- healing skin lesion mid upper forehead, present for a few months, patient is concerned  Review of Systems   Review of Systems   Constitutional: Positive for fatigue  HENT: Negative  Eyes: Negative  Respiratory: Negative  Cardiovascular: Negative  Gastrointestinal: Negative  Endocrine: Negative  Genitourinary: Negative  Musculoskeletal: Positive for arthralgias  Skin:        As outlined in HPI   Allergic/Immunologic: Negative  Neurological: Negative  Hematological: Negative  Psychiatric/Behavioral: Positive for dysphoric mood and sleep disturbance  Negative for self-injury and suicidal ideas  The patient is nervous/anxious  Active Problem List     Patient Active Problem List   Diagnosis    Biliary dyskinesia    Depression with anxiety    Dyslipidemia    Insomnia    TMJ arthralgia    Chronic right-sided low back pain with right-sided sciatica    Trigger finger, right middle finger    Trigger finger, right ring finger       Past Medical History:  Past Medical History:   Diagnosis Date    Headache        Past Surgical History:  No past surgical history on file      Family History:  Family History   Problem Relation Age of Onset    Seizures Sister     Lupus Sister        Social History:  Social History     Socioeconomic History    Marital status: /Civil Union     Spouse name: Not on file    Number of children: Not on file    Years of education: Not on file    Highest education level: Not on file   Occupational History     Employer: [employer]   Social Needs    Financial resource strain: Not on file    Food insecurity:     Worry: Not on file     Inability: Not on file    Transportation needs:     Medical: Not on file     Non-medical: Not on file   Tobacco Use    Smoking status: Current Every Day Smoker     Packs/day: 0 50     Years: 25 00     Pack years: 12 50    Smokeless tobacco: Never Used   Substance and Sexual Activity    Alcohol use: Yes     Comment: occasionally    Drug use: No    Sexual activity: Not on file   Lifestyle    Physical activity:     Days per week: Not on file     Minutes per session: Not on file    Stress: Not on file   Relationships    Social connections:     Talks on phone: Not on file     Gets together: Not on file     Attends Orthodoxy service: Not on file     Active member of club or organization: Not on file     Attends meetings of clubs or organizations: Not on file     Relationship status: Not on file    Intimate partner violence:     Fear of current or ex partner: Not on file     Emotionally abused: Not on file     Physically abused: Not on file     Forced sexual activity: Not on file   Other Topics Concern    Not on file   Social History Narrative    Not on file           Objective     Vitals:    03/02/20 1512   BP: 110/90   Pulse: 79   Resp: 16   Temp: 98 2 °F (36 8 °C)   TempSrc: Tympanic   SpO2: 98%   Weight: 51 7 kg (114 lb)   Height: 5' 0 63" (1 54 m)     Wt Readings from Last 3 Encounters:   03/02/20 51 7 kg (114 lb)   12/18/19 57 2 kg (126 lb)   12/03/19 57 6 kg (126 lb 15 8 oz)       Physical Exam   Constitutional: She is oriented to person, place, and time  She appears well-developed and well-nourished  HENT:   Head: Normocephalic and atraumatic  Eyes: Conjunctivae are normal    Neck: Neck supple  No JVD present  Carotid bruit is not present  No thyromegaly present  Cardiovascular: Normal rate, regular rhythm and normal heart sounds  No murmur heard  Pulmonary/Chest: Effort normal and breath sounds normal  No respiratory distress  She has no wheezes  Abdominal: She exhibits no abdominal bruit  Musculoskeletal: Normal range of motion  She exhibits no edema  Neurological: She is alert and oriented to person, place, and time  No cranial nerve deficit  Coordination normal    Skin:   Tiny scaly papular lesion upper mid forehead, rule out SCC? Psychiatric: She has a normal mood and affect   Her behavior is normal    Nursing note and vitals reviewed        Pertinent Laboratory/Diagnostic Studies:  Lab Results   Component Value Date    BUN 18 05/02/2017    CREATININE 0 68 05/02/2017    CALCIUM 9 2 05/02/2017     05/02/2017    K 4 0 05/02/2017    CO2 26 05/02/2017     05/02/2017     Lab Results   Component Value Date    ALT 12 05/02/2017    AST 12 05/02/2017    ALKPHOS 74 05/02/2017    BILITOT 0 5 05/02/2017       Lab Results   Component Value Date    WBC 8 0 05/02/2017    HGB 13 8 05/02/2017    HCT 41 0 05/02/2017    MCV 93 3 05/02/2017     05/02/2017       No results found for: TSH    Lab Results   Component Value Date    CHOL 204 (H) 05/02/2017     Lab Results   Component Value Date    TRIG 117 05/02/2017     Lab Results   Component Value Date    HDL 58 05/02/2017     No results found for: LDLCALC  No results found for: HGBA1C    Results for orders placed or performed in visit on 03/02/20   Urine culture   Result Value Ref Range    Urine Culture <10,000 cfu/ml     UA (URINE) with reflex to Scope   Result Value Ref Range    Color, UA Yellow     Clarity, UA Clear     Specific [place], UA 1 005 1 003 - 1 030    pH, UA 7 0 4 5, 5 0, 5 5, 6 0, 6 5, 7 0, 7 5, 8 0    Leukocytes, UA Small (A) Negative    Nitrite, UA Negative Negative    Protein, UA Negative Negative mg/dl    Glucose, UA Negative Negative mg/dl    Ketones, UA Negative Negative mg/dl    Urobilinogen, UA 0 2 0 2, 1 0 E U /dl E U /dl    Bilirubin, UA Negative Negative    Blood, UA Trace (A) Negative   Urine Microscopic   Result Value Ref Range    RBC, UA None Seen None Seen, 0-5 /hpf    WBC, UA 2-4 (A) None Seen, 0-5, 5-55, 5-65 /hpf    Epithelial Cells None Seen None Seen, Occasional /hpf    Bacteria, UA None Seen None Seen, Occasional /hpf    Hyaline Casts, UA None Seen None Seen /lpf   POCT urine dip   Result Value Ref Range    LEUKOCYTE ESTERASE,UA small     NITRITE,UA -     SL AMB POCT UROBILINOGEN 0 2     POCT URINE PROTEIN -      PH,UA 7 0     BLOOD,UA Hemo trace SPECIFIC GRAVITY,UA 1 005     KETONES,UA -     BILIRUBIN,UA -     GLUCOSE, UA -      COLOR,UA yellow     CLARITY,UA clear        Orders Placed This Encounter   Procedures    Urine culture    UA (URINE) with reflex to Scope    Urine Microscopic    CBC and differential    Comprehensive metabolic panel    Lipid Panel with Direct LDL reflex    TSH, 3rd generation    Vitamin D 25 hydroxy    Urinalysis with microscopic    Ambulatory referral to Dermatology    Ambulatory referral to Dermatology    POCT urine dip    POCT urine dip auto non-scope       ALLERGIES:  Allergies   Allergen Reactions    Azithromycin Nausea Only    Iodine Swelling and Edema     Reaction Date: 14Sep2011;        Current Medications     Current Outpatient Medications   Medication Sig Dispense Refill    multivitamin (THERAGRAN) TABS Take 1 tablet by mouth      LORazepam (ATIVAN) 0 5 mg tablet Take 1 tablet at bedtime as needed for anxiety/insomnia 30 tablet 1    sertraline (ZOLOFT) 50 mg tablet Take half a tablet once a day for 6 days then increase dose to 1 tablet daily 30 tablet 1     No current facility-administered medications for this visit          Medications Discontinued During This Encounter   Medication Reason    clotrimazole-betamethasone (LOTRISONE) 1-0 05 % cream Therapy completed    triamcinolone (KENALOG) 0 1 % cream Therapy completed    PARoxetine (PAXIL-CR) 12 5 mg 24 hr tablet        Health Maintenance     Health Maintenance   Topic Date Due    Hepatitis C Screening  1965    MAMMOGRAM  1965    CRC Screening: Colonoscopy  1965    Pneumococcal Vaccine: Pediatrics (0 to 5 Years) and At-Risk Patients (6 to 59 Years) (1 of 1 - PPSV23) 03/09/1971    DTaP,Tdap,and Td Vaccines (1 - Tdap) 03/09/1976    HIV Screening  03/09/1980    Annual Physical  10/18/2019    BMI: Adult  03/02/2021    Cervical Cancer Screening  10/11/2024    Pneumococcal Vaccine: 65+ Years (1 of 2 - PCV13) 03/09/2030    Influenza Vaccine  Completed    HIB Vaccine  Aged Out    Hepatitis B Vaccine  Aged Out    IPV Vaccine  Aged Out    Hepatitis A Vaccine  Aged Out    Meningococcal ACWY Vaccine  Aged Out    HPV Vaccine  Aged Out       Immunization History   Administered Date(s) Administered     Influenza (IM) Preservative Free 11/01/2018    Influenza, recombinant, quadrivalent,injectable, preservative free 12/10/2019       Romana Breaux MD

## 2020-03-03 LAB — BACTERIA UR CULT: NORMAL

## 2020-03-05 ENCOUNTER — TELEPHONE (OUTPATIENT)
Dept: FAMILY MEDICINE CLINIC | Facility: CLINIC | Age: 55
End: 2020-03-05

## 2020-03-05 PROBLEM — M79.604 PAIN OF RIGHT LOWER EXTREMITY: Status: RESOLVED | Noted: 2019-01-13 | Resolved: 2020-03-05

## 2020-03-22 RX ORDER — PAROXETINE HYDROCHLORIDE 12.5 MG/1
TABLET, FILM COATED, EXTENDED RELEASE ORAL
Qty: 30 TABLET | Refills: 2 | OUTPATIENT
Start: 2020-03-22

## 2020-03-25 ENCOUNTER — OFFICE VISIT (OUTPATIENT)
Dept: FAMILY MEDICINE CLINIC | Facility: CLINIC | Age: 55
End: 2020-03-25
Payer: COMMERCIAL

## 2020-03-25 DIAGNOSIS — F41.8 DEPRESSION WITH ANXIETY: Primary | ICD-10-CM

## 2020-03-25 DIAGNOSIS — G47.00 INSOMNIA, UNSPECIFIED TYPE: ICD-10-CM

## 2020-03-25 PROCEDURE — 99214 OFFICE O/P EST MOD 30 MIN: CPT | Performed by: FAMILY MEDICINE

## 2020-03-25 RX ORDER — PAROXETINE HYDROCHLORIDE 12.5 MG/1
12.5 TABLET, FILM COATED, EXTENDED RELEASE ORAL EVERY MORNING
Start: 2020-03-25 | End: 2020-04-08 | Stop reason: SDUPTHER

## 2020-03-25 NOTE — PROGRESS NOTES
Virtual Regular Visit    Problem List Items Addressed This Visit        Other    Depression with anxiety - Primary    Relevant Medications    PARoxetine (PAXIL-CR) 12 5 mg 24 hr tablet    Insomnia            Telephone visit regarding ongoing symptoms of depression and anxiety  Patient denies symptoms of suicidal homicidal ideation, she is committed to safety  Patient noticed worsening of symptoms on Zoloft 50 mg daily  I advised patient to discontinue sertraline and switch back to Paxil CR 12 5 mg daily for 5 days  I advised patient to increase dose of Paxil CR from 12 5-25 mg daily in 5-7 days  She will contact me with an update of her symptoms in 10 days  Patient will keep her regular scheduled appointment with me in April  I advised patient to start using lorazepam on an as-needed basis once a twice a day  I did inform her about sedating side effects of this medication  Patient understands instructions and agrees  We may consider alternative SSRI in the future but for the time being we need to stabilize symptoms of anxiety and depression  Patient feels are assured and is very comfortable with this treatment plan  Patient was prescribed p r n  lorazepam during last office visit but has not tried medications so far, I advised her to start using medication and warn her about sedating side effects  I strongly advised patient to stay in touch with me with any change or worsening of her symptoms  Patient understands instructions and agrees  Reason for visit is follow-up anxiety/depression    Encounter provider Romana Breaux MD    Provider located at [address] [place] [address]      Recent Visits  No visits were found meeting these conditions     Showing recent visits within past 7 days and meeting all other requirements     Today's Visits  Date Type Provider Dept   03/25/20 Office Visit Romana Breaux MD [place] [id number]  Showing today's visits and meeting all other requirements     Future Appointments  No visits were found meeting these conditions  Showing future appointments within next 150 days and meeting all other requirements        After connecting through Anna Lozabaio, the patient was identified by name and date of birth  Da Means was informed that this is a telemedicine visit and that the visit is being conducted through telephone which may not be secure and therefore, might not be HIPAA-compliant  My office door was closed  No one else was in the room  She acknowledged consent and understanding of privacy and security of the video platform  The patient has agreed to participate and understands they can discontinue the visit at any time  Ramón Adler is a 54 y o  female with longstanding history of depression and anxiety  Patient was seen in the office on March 2nd for annual physical   At the time she presented with worsening of depression symptoms  Patient has been using Paxil CR for many years  We discussed medication options with patient at that time and elected to switch her to Zoloft  Patient has been on new medication for nearly 3 weeks and noticed significant worsening of her symptoms  She is complaining of significant worsening of anxiety along with depression and feeling of worthlessness  Patient denies any active suicidal homicidal thoughts  He she feels very nervous  Patient is taking care of her elderly parents and has pending surgery at [facility] tentatively scheduled on March 30th  Patient feels that Zoloft is not a good medication for her  Patient admits to ongoing symptoms of insomnia, persistent stress  Past Medical History:   Diagnosis Date    Headache        No past surgical history on file      Current Outpatient Medications   Medication Sig Dispense Refill    LORazepam (ATIVAN) 0 5 mg tablet Take 1 tablet at bedtime as needed for anxiety/insomnia 30 tablet 1    multivitamin (THERAGRAN) TABS Take 1 tablet by mouth      PARoxetine (PAXIL-CR) 12 5 mg 24 hr tablet Take 1 tablet (12 5 mg total) by mouth every morning       No current facility-administered medications for this visit  Allergies   Allergen Reactions    Azithromycin Nausea Only    Iodine Swelling and Edema     Reaction Date: [id number];     Zoloft [Sertraline]      Worsening of anxiety       Review of Systems   Constitutional: Negative  Negative for fatigue and fever  Respiratory: Negative  Cardiovascular: Negative  Psychiatric/Behavioral: Positive for dysphoric mood and sleep disturbance  Negative for self-injury  The patient is nervous/anxious  Under stress         I spent 15  minutes with the patient during this visit

## 2020-04-08 ENCOUNTER — TELEPHONE (OUTPATIENT)
Dept: FAMILY MEDICINE CLINIC | Facility: CLINIC | Age: 55
End: 2020-04-08

## 2020-04-08 DIAGNOSIS — F41.8 DEPRESSION WITH ANXIETY: ICD-10-CM

## 2020-04-08 RX ORDER — PAROXETINE HYDROCHLORIDE 25 MG/1
25 TABLET, FILM COATED, EXTENDED RELEASE ORAL EVERY MORNING
Qty: 30 TABLET | Refills: 1 | Status: SHIPPED | OUTPATIENT
Start: 2020-04-08 | End: 2020-05-03 | Stop reason: SDUPTHER

## 2020-05-03 DIAGNOSIS — F41.8 DEPRESSION WITH ANXIETY: ICD-10-CM

## 2020-05-04 RX ORDER — PAROXETINE HYDROCHLORIDE 25 MG/1
25 TABLET, FILM COATED, EXTENDED RELEASE ORAL EVERY MORNING
Qty: 90 TABLET | Refills: 0 | Status: SHIPPED | OUTPATIENT
Start: 2020-05-04 | End: 2020-08-17 | Stop reason: SDUPTHER

## 2020-06-02 RX ORDER — SERTRALINE HYDROCHLORIDE 25 MG/1
25 TABLET, FILM COATED ORAL DAILY
COMMUNITY
Start: 2020-03-02 | End: 2020-06-04 | Stop reason: ALTCHOICE

## 2020-06-04 ENCOUNTER — OFFICE VISIT (OUTPATIENT)
Dept: FAMILY MEDICINE CLINIC | Facility: CLINIC | Age: 55
End: 2020-06-04
Payer: COMMERCIAL

## 2020-06-04 VITALS
BODY MASS INDEX: 23.05 KG/M2 | OXYGEN SATURATION: 97 % | SYSTOLIC BLOOD PRESSURE: 112 MMHG | HEART RATE: 75 BPM | TEMPERATURE: 96.1 F | HEIGHT: 60 IN | RESPIRATION RATE: 16 BRPM | DIASTOLIC BLOOD PRESSURE: 76 MMHG | WEIGHT: 117.4 LBS

## 2020-06-04 DIAGNOSIS — L60.9 NAIL ABNORMALITY: ICD-10-CM

## 2020-06-04 DIAGNOSIS — F41.8 DEPRESSION WITH ANXIETY: Primary | ICD-10-CM

## 2020-06-04 DIAGNOSIS — F51.01 PRIMARY INSOMNIA: ICD-10-CM

## 2020-06-04 DIAGNOSIS — B35.1 ONYCHOMYCOSIS: ICD-10-CM

## 2020-06-04 DIAGNOSIS — Z12.39 SCREENING FOR BREAST CANCER: ICD-10-CM

## 2020-06-04 PROCEDURE — 4004F PT TOBACCO SCREEN RCVD TLK: CPT | Performed by: FAMILY MEDICINE

## 2020-06-04 PROCEDURE — 3008F BODY MASS INDEX DOCD: CPT | Performed by: FAMILY MEDICINE

## 2020-06-04 PROCEDURE — 99213 OFFICE O/P EST LOW 20 MIN: CPT | Performed by: FAMILY MEDICINE

## 2020-06-04 RX ORDER — ESZOPICLONE 2 MG/1
2 TABLET, FILM COATED ORAL
Qty: 30 TABLET | Refills: 1 | Status: CANCELLED | OUTPATIENT
Start: 2020-06-04

## 2020-06-09 RX ORDER — ESZOPICLONE 2 MG/1
2 TABLET, FILM COATED ORAL
Qty: 30 TABLET | Refills: 1 | Status: SHIPPED | OUTPATIENT
Start: 2020-06-09 | End: 2021-07-14 | Stop reason: ALTCHOICE

## 2020-06-12 ENCOUNTER — EVALUATION (OUTPATIENT)
Dept: OCCUPATIONAL THERAPY | Facility: CLINIC | Age: 55
End: 2020-06-12
Payer: OTHER MISCELLANEOUS

## 2020-06-12 DIAGNOSIS — M65.331 TRIGGER FINGER, RIGHT MIDDLE FINGER: Primary | ICD-10-CM

## 2020-06-12 PROCEDURE — 97165 OT EVAL LOW COMPLEX 30 MIN: CPT | Performed by: OCCUPATIONAL THERAPIST

## 2020-06-12 PROCEDURE — 97110 THERAPEUTIC EXERCISES: CPT | Performed by: OCCUPATIONAL THERAPIST

## 2020-06-12 PROCEDURE — 97010 HOT OR COLD PACKS THERAPY: CPT | Performed by: OCCUPATIONAL THERAPIST

## 2020-06-17 ENCOUNTER — OFFICE VISIT (OUTPATIENT)
Dept: OCCUPATIONAL THERAPY | Facility: CLINIC | Age: 55
End: 2020-06-17
Payer: OTHER MISCELLANEOUS

## 2020-06-17 DIAGNOSIS — M65.331 TRIGGER FINGER, RIGHT MIDDLE FINGER: Primary | ICD-10-CM

## 2020-06-17 PROCEDURE — 97140 MANUAL THERAPY 1/> REGIONS: CPT | Performed by: OCCUPATIONAL THERAPIST

## 2020-06-17 PROCEDURE — 97110 THERAPEUTIC EXERCISES: CPT | Performed by: OCCUPATIONAL THERAPIST

## 2020-06-24 ENCOUNTER — APPOINTMENT (OUTPATIENT)
Dept: OCCUPATIONAL THERAPY | Facility: CLINIC | Age: 55
End: 2020-06-24
Payer: OTHER MISCELLANEOUS

## 2020-06-25 ENCOUNTER — OFFICE VISIT (OUTPATIENT)
Dept: OCCUPATIONAL THERAPY | Facility: CLINIC | Age: 55
End: 2020-06-25
Payer: OTHER MISCELLANEOUS

## 2020-06-25 DIAGNOSIS — M65.331 TRIGGER FINGER, RIGHT MIDDLE FINGER: Primary | ICD-10-CM

## 2020-06-25 PROCEDURE — 97110 THERAPEUTIC EXERCISES: CPT | Performed by: OCCUPATIONAL THERAPIST

## 2020-06-25 PROCEDURE — 97140 MANUAL THERAPY 1/> REGIONS: CPT | Performed by: OCCUPATIONAL THERAPIST

## 2020-06-26 ENCOUNTER — OFFICE VISIT (OUTPATIENT)
Dept: OCCUPATIONAL THERAPY | Facility: CLINIC | Age: 55
End: 2020-06-26
Payer: OTHER MISCELLANEOUS

## 2020-06-26 DIAGNOSIS — M65.331 TRIGGER FINGER, RIGHT MIDDLE FINGER: Primary | ICD-10-CM

## 2020-06-26 PROCEDURE — 97110 THERAPEUTIC EXERCISES: CPT | Performed by: OCCUPATIONAL THERAPIST

## 2020-06-30 ENCOUNTER — OFFICE VISIT (OUTPATIENT)
Dept: OCCUPATIONAL THERAPY | Facility: CLINIC | Age: 55
End: 2020-06-30
Payer: OTHER MISCELLANEOUS

## 2020-06-30 DIAGNOSIS — M65.331 TRIGGER FINGER, RIGHT MIDDLE FINGER: Primary | ICD-10-CM

## 2020-06-30 PROCEDURE — 97140 MANUAL THERAPY 1/> REGIONS: CPT | Performed by: OCCUPATIONAL THERAPIST

## 2020-06-30 PROCEDURE — 97110 THERAPEUTIC EXERCISES: CPT | Performed by: OCCUPATIONAL THERAPIST

## 2020-06-30 PROCEDURE — 97530 THERAPEUTIC ACTIVITIES: CPT | Performed by: OCCUPATIONAL THERAPIST

## 2020-07-02 ENCOUNTER — OFFICE VISIT (OUTPATIENT)
Dept: OCCUPATIONAL THERAPY | Facility: CLINIC | Age: 55
End: 2020-07-02
Payer: OTHER MISCELLANEOUS

## 2020-07-02 DIAGNOSIS — M65.331 TRIGGER FINGER, RIGHT MIDDLE FINGER: Primary | ICD-10-CM

## 2020-07-02 PROCEDURE — 97140 MANUAL THERAPY 1/> REGIONS: CPT | Performed by: OCCUPATIONAL THERAPIST

## 2020-07-02 PROCEDURE — 97110 THERAPEUTIC EXERCISES: CPT | Performed by: OCCUPATIONAL THERAPIST

## 2020-07-02 PROCEDURE — 97530 THERAPEUTIC ACTIVITIES: CPT | Performed by: OCCUPATIONAL THERAPIST

## 2020-07-02 NOTE — PROGRESS NOTES
Daily Note     Today's date: 2020  Patient name: Gabriel José  : 1965  MRN: [id number]  Referring provider: Lottie Mccray PA-C  Dx:   Encounter Diagnosis     ICD-10-CM    1  Trigger finger, right middle finger M65 331                   Subjective: "it catches me off guard"      Objective: See treatment diary below  Assessment: Pain continues to be a limiting factor in motion and function  She has been using her LMB splint and there is improvement in extension  Plan: Continue per plan of care        Precautions: Universal      Manuals        Scar massage 3' 8' 8 5 8' 8'       Intrinsic stretch   5                                    Neuro Re-Ed             Weight bearing on table      through wedge, 30 seconds, 3x                                                                                     Ther Ex             ROM as tolerated 10' 3'   3' 3'       Place and holds 10x            keypegs  1x 1x  1x with 1 marble        Wall walking    1# x 10  1# 10x       velcro pinch board  2x 2x in 3jaw          Digit extension   rubber bands 1x lrg rb, TB lrg rb onn Tb 30x Extension web yellow 3x10 Extension web yellow 3x10       Intrinsic roll   Pen 10x 10 sec          Pinch [place]    RG         Digiflex     Red 30x Red 30x       Reverse blocking   2x 10          Ther Activity             Grasping  Red PW 3x10 RPW 2x 10, G 2x 10 G 3x 10 Green 30x         WF/WE/S/P    RPB 3 x 10         Jar turning       Orange 10/10       Pinch + grasp    R CP + W 4th R/G CP + W 4th R/G CP + W 4th       Gait Training                                       Modalities             MHP 10' 5' 10 15 5' 10'

## 2020-07-03 ENCOUNTER — APPOINTMENT (OUTPATIENT)
Dept: OCCUPATIONAL THERAPY | Facility: CLINIC | Age: 55
End: 2020-07-03
Payer: OTHER MISCELLANEOUS

## 2020-07-07 ENCOUNTER — OFFICE VISIT (OUTPATIENT)
Dept: OCCUPATIONAL THERAPY | Facility: CLINIC | Age: 55
End: 2020-07-07
Payer: OTHER MISCELLANEOUS

## 2020-07-07 DIAGNOSIS — M65.331 TRIGGER FINGER, RIGHT MIDDLE FINGER: Primary | ICD-10-CM

## 2020-07-07 PROCEDURE — 97530 THERAPEUTIC ACTIVITIES: CPT | Performed by: OCCUPATIONAL THERAPIST

## 2020-07-07 PROCEDURE — 97110 THERAPEUTIC EXERCISES: CPT | Performed by: OCCUPATIONAL THERAPIST

## 2020-07-07 PROCEDURE — 97140 MANUAL THERAPY 1/> REGIONS: CPT | Performed by: OCCUPATIONAL THERAPIST

## 2020-07-07 NOTE — PROGRESS NOTES
Daily Note     Today's date: 2020  Patient name: Dominga Jean Baptiste  : 1965  MRN: [id number]  Referring provider: Araceli Saavedra PA-C  Dx:   Encounter Diagnosis     ICD-10-CM    1  Trigger finger, right middle finger M65 331                   Subjective: She has returned to work  Objective: See treatment diary below  Assessment: PIP joint pain into flexion and extension  Improving scar adhesion  Plan: Continue per plan of care        Precautions: Universal      Manuals       Scar massage 3' 8' 8 5 8' 8' 8'      Intrinsic stretch   5                                    Neuro Re-Ed             Weight bearing on table      through wedge, 30 seconds, 3x through wedge, 30 seconds, 3x                                                                                    Ther Ex             ROM as tolerated 10' 3'   3' 3' 3'      Place and holds 10x            keypegs  1x 1x  1x with 1 marble        Wall walking    1# x 10  1# 10x Red ball 8x        velcro pinch board  2x 2x in 3jaw          Digit extension   rubber bands 1x lrg rb, TB lrg rb onn Tb 30x Extension web yellow 3x10 Extension web yellow 3x10 Extension web yellow 2x10, orange 2x10       Intrinsic roll   Pen 10x 10 sec          Pinch [place]    RG         Digiflex     Red 30x Red 30x Red isolated 30x, green composite 30x      Reverse blocking   2x 10          Ther Activity             Grasping  Red PW 3x10 RPW 2x 10, G 2x 10 G 3x 10 Green 30x   Green 30x       WF/WE/S/P    RPB 3 x 10         Jar turning       Orange 10/10 Orange 10/10      Pinch + grasp    R CP + W 4th R/G CP + W 4th R/G CP + W 4th R/G CP + W 4th      Gait Training                                       Modalities             MHP 10' 5' 10 15 5' 10' 5'

## 2020-07-10 ENCOUNTER — OFFICE VISIT (OUTPATIENT)
Dept: OCCUPATIONAL THERAPY | Facility: CLINIC | Age: 55
End: 2020-07-10
Payer: OTHER MISCELLANEOUS

## 2020-07-10 DIAGNOSIS — M65.331 TRIGGER FINGER, RIGHT MIDDLE FINGER: Primary | ICD-10-CM

## 2020-07-10 PROCEDURE — 97530 THERAPEUTIC ACTIVITIES: CPT | Performed by: OCCUPATIONAL THERAPIST

## 2020-07-10 PROCEDURE — 97110 THERAPEUTIC EXERCISES: CPT | Performed by: OCCUPATIONAL THERAPIST

## 2020-07-10 PROCEDURE — 97140 MANUAL THERAPY 1/> REGIONS: CPT | Performed by: OCCUPATIONAL THERAPIST

## 2020-07-10 NOTE — PROGRESS NOTES
Daily Note     Today's date: 7/10/2020  Patient name: Marcela Parmar  : 1965  MRN: [id number]  Referring provider: Pat Aragon PA-C  Dx:   Encounter Diagnosis     ICD-10-CM    1  Trigger finger, right middle finger M65 331                   Subjective: She has returned to work  Objective: See treatment diary below  Assessment: Nearing full digital extension, however due to the inability to hyperextend at the PIP, she feels she cannot do her normal work with her hand  Added PREs today to simulate this task  Plan: Continue per plan of care        Precautions: Universal      Manuals  7/10     Scar massage 3' 8' 8 5 8' 8' 8' 8'     Intrinsic stretch   5                                    Neuro Re-Ed             Weight bearing on table      through wedge, 30 seconds, 3x through wedge, 30 seconds, 3x through wedge, 30 seconds, 3x                                                                                   Ther Ex             ROM as tolerated 10' 3'   3' 3' 3' 3'     Place and holds 10x            keypegs  1x 1x  1x with 1 marble        Wall walking    1# x 10  1# 10x Red ball 8x        velcro pinch board  2x 2x in 3jaw          Digit extension   rubber bands 1x lrg rb, TB lrg rb onn Tb 30x Extension web yellow 3x10 Extension web yellow 3x10 Extension web yellow 2x10, orange 2x10  Extension web yellow 2x10, orange 2x10      Intrinsic roll   Pen 10x 10 sec          Pinch [place]    RG         Digiiflex     Red 30x Red 30x Red isolated 30x, green composite 30x Red isolated 30x, green composite 30x     Reverse blocking   2x 10          Ther Activity             Grasping  Red PW 3x10 RPW 2x 10, G 2x 10 G 3x 10 Green 30x   Green 30x  Green 30x     WF/WE/S/P    RPB 3 x 10    GFB 3x10     Jar turning       Orange 10/10 [place] 10/10 Orange 10/10     Pinch + grasp    R CP + W 4th R/G CP + W 4th R/G CP + W 4th R/G CP + W 4th G CP + W 4th     Dough press        In orange putty 2x10                                Modalities             MHP 10' 5' 10 15 5' 10' 5' 5'

## 2020-07-14 ENCOUNTER — OFFICE VISIT (OUTPATIENT)
Dept: OCCUPATIONAL THERAPY | Facility: CLINIC | Age: 55
End: 2020-07-14
Payer: OTHER MISCELLANEOUS

## 2020-07-14 DIAGNOSIS — M65.331 TRIGGER FINGER, RIGHT MIDDLE FINGER: Primary | ICD-10-CM

## 2020-07-14 PROCEDURE — 97140 MANUAL THERAPY 1/> REGIONS: CPT | Performed by: OCCUPATIONAL THERAPIST

## 2020-07-14 PROCEDURE — 97530 THERAPEUTIC ACTIVITIES: CPT | Performed by: OCCUPATIONAL THERAPIST

## 2020-07-14 PROCEDURE — 97110 THERAPEUTIC EXERCISES: CPT | Performed by: OCCUPATIONAL THERAPIST

## 2020-07-14 NOTE — PROGRESS NOTES
Daily Note     Today's date: 2020  Patient name: Mónica Cuellar  : 1965  MRN: [id number]  Referring provider: Génesis Melo PA-C  Dx:   Encounter Diagnosis     ICD-10-CM    1  Trigger finger, right middle finger M65 331                   Subjective: "It isn't too bad today"       Objective: See treatment diary below  Assessment: Moderate PIP joint discomfort with end range of motion  Will continue to progress strength, function, and motion  Plan: Continue per plan of care  Re-eval next session        Precautions: Universal       Manuals 6/12 6/16 6/25 6/26 6/30 7/2 7/7 7/10 7/14    Scar massage 3' 8' 8 5 8' 8' 8' 8' 8'    Intrinsic stretch   5                                    Neuro Re-Ed             Weight bearing on table      through wedge, 30 seconds, 3x through wedge, 30 seconds, 3x through wedge, 30 seconds, 3x                                                                                   Ther Ex             ROM as tolerated 10' 3'   3' 3' 3' 3' 5'    Place and holds 10x            keypegs  1x 1x  1x with 1 marble        Wall walking    1# x 10  1# 10x Red ball 8x    Red ball 8x    velcro pinch board  2x 2x in 3jaw          Digit extension   rubber bands 1x lrg rb, TB lrg rb onn Tb 30x Extension web yellow 3x10 Extension web yellow 3x10 Extension web yellow 2x10, orange 2x10  Extension web yellow 2x10, orange 2x10  Extension web orange 3x15    Intrinsic roll   Pen 10x 10 sec          Pinch [place]    RG         Digiflex     Red 30x Red 30x Red isolated 30x, green composite 30x Red isolated 30x, green composite 30x green isolated 30x, blue composite 30x    Reverse blocking   2x 10          Ther Activity             Grasping  Red PW 3x10 RPW 2x 10, G 2x 10 G 3x 10 Green 30x   Green 30x  Green 30x     WF/WE/S/P    RPB 3 x 10    GFB 3x10     Jar turning       Orange 10/10 [place] 10/10 Orange 10/10 Orange 10/10    Pinch + grasp    R CP + W 4th R/G CP + W 4th R/G CP + W 4th R/G CP + W 4th G CP + W 4th Green in, out with 5th     Dough press        In orange putty 2x10  In orange putty 2x10                               Modalities             MHP 10' 5' 10 15 5' 10' 5' 5' 5'

## 2020-07-16 ENCOUNTER — TRANSCRIBE ORDERS (OUTPATIENT)
Dept: PHYSICAL THERAPY | Facility: CLINIC | Age: 55
End: 2020-07-16

## 2020-07-16 ENCOUNTER — EVALUATION (OUTPATIENT)
Dept: OCCUPATIONAL THERAPY | Facility: CLINIC | Age: 55
End: 2020-07-16
Payer: OTHER MISCELLANEOUS

## 2020-07-16 DIAGNOSIS — M65.331 TRIGGER MIDDLE FINGER OF RIGHT HAND: Primary | ICD-10-CM

## 2020-07-16 DIAGNOSIS — M65.331 TRIGGER FINGER, RIGHT MIDDLE FINGER: Primary | ICD-10-CM

## 2020-07-16 PROCEDURE — 97140 MANUAL THERAPY 1/> REGIONS: CPT | Performed by: OCCUPATIONAL THERAPIST

## 2020-07-16 PROCEDURE — 97110 THERAPEUTIC EXERCISES: CPT | Performed by: OCCUPATIONAL THERAPIST

## 2020-07-16 PROCEDURE — 97530 THERAPEUTIC ACTIVITIES: CPT | Performed by: OCCUPATIONAL THERAPIST

## 2020-07-16 NOTE — LETTER
2020    Rosa Elena Macias PA-C  [address] [place] [address] Gregor Daigle    Patient: Da Urbina   YOB: 1965   Date of Visit: 2020     Encounter Diagnosis     ICD-10-CM    1  Trigger finger, right middle finger M65 331        Dear Dr Luis Tilley:    Thank you for your recent referral of Da Urbina  Please review the attached evaluation summary from Gaby's recent visit  Please verify that you agree with the plan of care by signing the attached order  If you have any questions or concerns, please do not hesitate to call  I sincerely appreciate the opportunity to share in the care of one of your patients and hope to have another opportunity to work with you in the near future  Sincerely,    Sonja Akhtar, OT      Referring Provider:     I certify that I have read the below Plan of Care and certify the need for these services furnished under this plan of treatment while under my care  JACINTO Candelario [facility]  [address]  [facility]: [phone]        OT Re-Evaluation     Today's date: 2020  Patient name: Da Urbina  : 1965  MRN: [id number]  Referring provider: Senait Hightower PA-C  Dx:   Encounter Diagnosis     ICD-10-CM    1  Trigger finger, right middle finger M65 331                   Assessment  Assessment details: Cat Smart is s/p R long finger trigger finger release on 20  Since beginning therapy, scar tissue density has reduced, she has full flexion to the palm, and  strength has improved  She experiences some tenderness across the palm and remaining scar tissue with gripping hard items such as knives  She continues to demonstrate a slight PIP flexion contracture (less than 10 degrees) that she is utilizing a dynamic extension splint and a nighttime serial static splint to manage  Most of her pain is in the PIP joint due to residual stiffness   She will benefit from continued therapy to address the remaining impairments  See below for a detailed assessment  Impairments: abnormal or restricted ROM, activity intolerance, impaired physical strength, lacks appropriate home exercise program and pain with function    Symptom irritability: moderateUnderstanding of Dx/Px/POC: good   Prognosis: good    Goals  STG: Patient will be compliant with home exercise program in 1 week  - MET  STG: Pain will not exceed a 3/10 during appropriate activity and during therapy in 3 weeks - NOT MET  STG: Inflammation/edema/joint effusion will be reduced by 2mm and patient will be independent with self management techniques in 4 weeks  - PARTIALLY MET  STG: Range of motion of right middle finger will be improved by 25% in 3 weeks  - MET  STG: Strength will be improved by 25% in 4 weeks  - MET    LTG: Range of motion of right middle finger will be improved to contralateral side measures in 6-8 weeks or discharge  - IN PROGRESS  LTG: Strength will be improved to 75% the contralateral side in 6-8 weeks or discharge  - IN PROGRESS  LTG: Performance in ADLs and IADLS will be improved to prior level of function with the affected extremity within 6 weeks or discharge  - IN PROGRESS  LTG: Performance in work activity will be improved to prior level of function with the affected extremity within 6 weeks or discharge  - IN PROGRESS  LTG: FOTO score increase by 20 points within 6 weeks or discharge  - IN PROGRESS              Plan  Plan details: Treatment to include modalities, manual therapy, PRE's, HEP, and orthotics as appropriate     Patient would benefit from: skilled OT, OT eval and custom splinting  Planned modality interventions: thermotherapy: hydrocollator packs  Planned therapy interventions: home exercise program, joint mobilization, manual therapy, therapeutic exercise, patient education, therapeutic activities, strengthening and stretching  Frequency: 2x week  Duration in visits: 10  Plan of Care beginning date: 2020  Plan of Care expiration date: 2020  Treatment plan discussed with: patient        Subjective Evaluation    History of Present Illness  Date of surgery: 2020  Mechanism of injury: surgery  Mechanism of injury: She reports pain and symptoms including locking in the finger for about a year prior to surgery  Pain  At best pain ratin (No pain at rest)  At worst pain ratin  Symptom course: Improving      Social Support    Employment status: working ([employer] at [employer])  Hand dominance: right    Treatments  Current treatment: occupational therapy  Patient Goals  Patient goals for therapy: decreased pain, decreased edema, increased motion, increased strength, return to work and independence with ADLs/IADLs          Objective     Active Range of Motion     Right Digits   Flexion   Middle     MCP: 82    PIP: 89    DIP: 43  Extension   Middle     MCP: -10    PIP: -8    DIP: 6    Strength/Myotome Testing     Left Wrist/Hand      (2nd hand position)     Trial 1: 43    Thumb Strength  Palmar/Three-Point Pinch     Trial 1: 7    Right Wrist/Hand      (2nd hand position)     Trial 1: 28 2    Comments: No pain    Thumb Strength   Palmar/Three-Point Pinch     Trial 1: 5 8    Swelling     Left Wrist/Hand   Middle     Proximal: 5 3 cm    Additional Swelling Details  5 4 at PIP    Right Wrist/Hand   Middle     Proximal: 5 8 cm    Additional Swelling Details  6 0 at PIP                   Manuals 6/12 6/16 6/25 6/26 6/30 7/2 7/7 7/10 7/14  7/16   Scar massage 3' 8' 8 5 8' 8' 8' 8' 8'  8'   Intrinsic stretch     5                                                                 Neuro Re-Ed                       Weight bearing on table           through wedge, 30 seconds, 3x through wedge, 30 seconds, 3x through wedge, 30 seconds, 3x                                                                                                                                                       Ther Ex                       ROM as tolerated 10' 3'     3' 3' 3' 3' 5'     Place and holds 10x                     keypegs   1x 1x   1x with 1 marble             Wall walking       1# x 10   1# 10x Red ball 8x     Red ball 8x  Red ball 8x   velcro pinch board   2x 2x in 3jaw                 Digit extension    rubber bands 1x lrg rb, TB lrg rb onn Tb 30x Extension web yellow 3x10 Extension web yellow 3x10 Extension web yellow 2x10, orange 2x10  Extension web yellow 2x10, orange 2x10  Extension web orange 3x15  Extension web orange 3x15   Intrinsic roll     Pen 10x 10 sec                 Pinch [place]       RG               Digiflex         Red 30x Red 30x Red isolated 30x, green composite 30x Red isolated 30x, green composite 30x green isolated 30x, blue composite 30x  green isolated 30x, blue composite 30x   Reverse blocking     2x 10              into orange putty 2x10   Ther Activity                       Grasping   Red PW 3x10 RPW 2x 10, G 2x 10 G 3x 10 Green 30x    Green 30x  Green 30x       WF/WE/S/P       RPB 3 x 10       GFB 3x10       Jar turning            [place] 10/10 [place] 10/10 Orange 10/10 Orange 10/10 Orange 10/10   Pinch + grasp       R CP + W 4th R/G CP + W 4th R/G CP + W 4th R/G CP + W 4th G CP + W 4th Green in, out with 5th  Green in, out with 5th    Dough press               In orange putty 2x10  In orange putty 2x10   in orange putty 2x10                                                    Modalities                       MHP 8' 5' 10 15 5' 10' 5' 5' 5'  5'

## 2020-07-16 NOTE — PROGRESS NOTES
OT Re-Evaluation     Today's date: 2020  Patient name: Shay Paez  : 1965  MRN: [id number]  Referring provider: Nahum Swift PA-C  Dx:   Encounter Diagnosis     ICD-10-CM    1  Trigger finger, right middle finger M65 331                   Assessment  Assessment details: Antonieta Garcia is s/p R long finger trigger finger release on 20  Since beginning therapy, scar tissue density has reduced, she has full flexion to the palm, and  strength has improved  She experiences some tenderness across the palm and remaining scar tissue with gripping hard items such as knives  She continues to demonstrate a slight PIP flexion contracture (less than 10 degrees) that she is utilizing a dynamic extension splint and a nighttime serial static splint to manage  Most of her pain is in the PIP joint due to residual stiffness  She will benefit from continued therapy to address the remaining impairments  See below for a detailed assessment  Impairments: abnormal or restricted ROM, activity intolerance, impaired physical strength, lacks appropriate home exercise program and pain with function    Symptom irritability: moderateUnderstanding of Dx/Px/POC: good   Prognosis: good    Goals  STG: Patient will be compliant with home exercise program in 1 week  - MET  STG: Pain will not exceed a 3/10 during appropriate activity and during therapy in 3 weeks - NOT MET  STG: Inflammation/edema/joint effusion will be reduced by 2mm and patient will be independent with self management techniques in 4 weeks  - PARTIALLY MET  STG: Range of motion of right middle finger will be improved by 25% in 3 weeks  - MET  STG: Strength will be improved by 25% in 4 weeks  - MET    LTG: Range of motion of right middle finger will be improved to contralateral side measures in 6-8 weeks or discharge  - IN PROGRESS  LTG: Strength will be improved to 75% the contralateral side in 6-8 weeks or discharge   - IN PROGRESS  LTG: Performance in ADLs and IADLS will be improved to prior level of function with the affected extremity within 6 weeks or discharge  - IN PROGRESS  LTG: Performance in work activity will be improved to prior level of function with the affected extremity within 6 weeks or discharge  - IN PROGRESS  LTG: FOTO score increase by 20 points within 6 weeks or discharge  - IN PROGRESS              Plan  Plan details: Treatment to include modalities, manual therapy, PRE's, HEP, and orthotics as appropriate  Patient would benefit from: skilled OT, OT eval and custom splinting  Planned modality interventions: thermotherapy: hydrocollator packs  Planned therapy interventions: home exercise program, joint mobilization, manual therapy, therapeutic exercise, patient education, therapeutic activities, strengthening and stretching  Frequency: 2x week  Duration in visits: 10  Plan of Care beginning date: 2020  Plan of Care expiration date: 2020  Treatment plan discussed with: patient        Subjective Evaluation    History of Present Illness  Date of surgery: 2020  Mechanism of injury: surgery  Mechanism of injury: She reports pain and symptoms including locking in the finger for about a year prior to surgery  Pain  At best pain ratin (No pain at rest)  At worst pain ratin  Symptom course: Improving      Social Support    Employment status: working ( at [employer])  Hand dominance: right    Treatments  Current treatment: occupational therapy  Patient Goals  Patient goals for therapy: decreased pain, decreased edema, increased motion, increased strength, return to work and independence with ADLs/IADLs          Objective     Active Range of Motion     Right Digits   Flexion   Middle     MCP: 82    PIP: 89    DIP: 43  Extension   Middle     MCP: -10    PIP: -8    DIP: 6    Strength/Myotome Testing     Left Wrist/Hand      (2nd hand position)     Trial 1: 43    Thumb Strength  Palmar/Three-Point Pinch     Trial 1: 7    Right Wrist/Hand      (2nd hand position)     Trial 1: 28 2    Comments: No pain    Thumb Strength   Palmar/Three-Point Pinch     Trial 1: 5 8    Swelling     Left Wrist/Hand   Middle     Proximal: 5 3 cm    Additional Swelling Details  5 4 at PIP    Right Wrist/Hand   Middle     Proximal: 5 8 cm    Additional Swelling Details  6 0 at PIP                   Manuals 6/12 6/16 6/25 6/26 6/30 7/2 7/7 7/10 7/14  7/16   Scar massage 3' 8' 8 5 8' 8' 8' 8' 8'  8'   Intrinsic stretch     5                                                                 Neuro Re-Ed                       Weight bearing on table           through wedge, 30 seconds, 3x through wedge, 30 seconds, 3x through wedge, 30 seconds, 3x                                                                                                                                                       Ther Ex                       ROM as tolerated 10' 3'     3' 3' 3' 3' 5'     Place and holds 10x                     keypegs   1x 1x   1x with 1 marble             Wall walking       1# x 10   1# 10x Red ball 8x     Red ball 8x  Red ball 8x   velcro pinch board   2x 2x in 3jaw                 Digit extension    rubber bands 1x lrg rb, TB lrg rb onn Tb 30x Extension web yellow 3x10 Extension web yellow 3x10 Extension web yellow 2x10, orange 2x10  Extension web yellow 2x10, orange 2x10  Extension web orange 3x15  Extension web orange 3x15   Intrinsic roll     Pen 10x 10 sec                 Pinch [place]       RG               Digiflex         Red 30x Red 30x Red isolated 30x, green composite 30x Red isolated 30x, green composite 30x green isolated 30x, blue composite 30x  green isolated 30x, blue composite 30x   Reverse blocking     2x 10              into orange putty 2x10   Ther Activity                       Grasping   Red PW 3x10 RPW 2x 10, G 2x 10 G 3x 10 Green 30x    Green 30x  Green 30x       WF/WE/S/P       RPB 3 x 10       GFB 3x10       Jar turning            [place] 10/10 Orange 10/10 Orange 10/10 Orange 10/10 Orange 10/10   Pinch + grasp       R CP + W 4th R/G CP + W 4th R/G CP + W 4th R/G CP + W 4th G CP + W 4th Green in, out with 5th  Green in, out with 5th    Dough press               In orange putty 2x10  In orange putty 2x10   in orange putty 2x10                                                    Modalities                       MHP 8' 5' 10 15 5' 10' 5' 5' 5'  5'

## 2020-07-17 ENCOUNTER — APPOINTMENT (OUTPATIENT)
Dept: OCCUPATIONAL THERAPY | Facility: CLINIC | Age: 55
End: 2020-07-17
Payer: OTHER MISCELLANEOUS

## 2020-07-21 ENCOUNTER — OFFICE VISIT (OUTPATIENT)
Dept: OCCUPATIONAL THERAPY | Facility: CLINIC | Age: 55
End: 2020-07-21
Payer: OTHER MISCELLANEOUS

## 2020-07-21 DIAGNOSIS — M65.331 TRIGGER FINGER, RIGHT MIDDLE FINGER: Primary | ICD-10-CM

## 2020-07-21 PROCEDURE — 97110 THERAPEUTIC EXERCISES: CPT | Performed by: OCCUPATIONAL THERAPIST

## 2020-07-21 PROCEDURE — 97530 THERAPEUTIC ACTIVITIES: CPT | Performed by: OCCUPATIONAL THERAPIST

## 2020-07-21 PROCEDURE — 97140 MANUAL THERAPY 1/> REGIONS: CPT | Performed by: OCCUPATIONAL THERAPIST

## 2020-07-21 NOTE — PROGRESS NOTES
Daily Note     Today's date: 2020  Patient name: Nicky Salas  : 1965  MRN: [id number]  Referring provider: Suman Arreola PA-C  Dx:   Encounter Diagnosis     ICD-10-CM    1  Trigger finger, right middle finger M65 331                   Subjective: "this morning it isn't too bad"      Objective: See treatment diary below  Assessment: Springy end feel into flexion but pain prevents full motion at the PIP joint  Nodule still palpable along the flexor tendon as well and is tender          Plan: Progress treatment as tolerated              Manuals 7/21 6/16 6/25 6/26 6/30 7/2 7/7 7/10 7/14  7/16   Scar massage 8' 8' 8 5 8' 8' 8' 8' 8'  8'   Intrinsic stretch     5                                                                 Neuro Re-Ed                       Weight bearing on table  on wall, one hand 30 seconds, 3x         through wedge, 30 seconds, 3x through wedge, 30 seconds, 3x through wedge, 30 seconds, 3x                                                                                                                                                       Ther Ex                       ROM as tolerated 3' 3'     3' 3' 3' 3' 5'     Place and holds 5x                     keypegs   1x 1x   1x with 1 marble             Wall walking  Red ball 8x     1# x 10   1# 10x Red ball 8x     Red ball 8x  Red ball 8x   velcro pinch board   2x 2x in 3jaw                 Digit extension   Extension web orange 3x15 rubber bands 1x lrg rb, TB lrg rb onn Tb 30x Extension web yellow 3x10 Extension web yellow 3x10 Extension web yellow 2x10, orange 2x10  Extension web yellow 2x10, orange 2x10  Extension web orange 3x15  Extension web orange 3x15   Intrinsic roll     Pen 10x 10 sec                 Pinch [place]       RG               Digiflex  green isolated 30x, blue composite 30x       Red 30x Red 30x Red isolated 30x, green composite 30x Red isolated 30x, green composite 30x green isolated 30x, blue composite 30x  green isolated 30x, blue composite 30x   Reverse blocking     2x 10              into orange putty 2x10   Ther Activity                       Grasping   Red PW 3x10 RPW 2x 10, G 2x 10 G 3x 10 Green 30x    Green 30x  Green 30x       WF/WE/S/P       RPB 3 x 10       GFB 3x10       Jar turning   [place] 10/10         [place] 10/10 [place] 10/10 Orange 10/10 Orange 10/10 Orange 10/10   Pinch + grasp  Green in, out with 5th      R CP + W 4th R/G CP + W 4th R/G CP + W 4th R/G CP + W 4th G CP + W 4th Green in, out with 5th  Green in, out with 5th    Dough press  in orange putty 2x10              In orange putty 2x10  In orange putty 2x10   in orange putty 2x10                                                    Modalities                       MHP 5' 5' 10 15 5' 10' 5' 5' 5'  5'

## 2020-07-23 ENCOUNTER — APPOINTMENT (OUTPATIENT)
Dept: OCCUPATIONAL THERAPY | Facility: CLINIC | Age: 55
End: 2020-07-23
Payer: OTHER MISCELLANEOUS

## 2020-07-24 ENCOUNTER — APPOINTMENT (OUTPATIENT)
Dept: OCCUPATIONAL THERAPY | Facility: CLINIC | Age: 55
End: 2020-07-24
Payer: OTHER MISCELLANEOUS

## 2020-07-28 ENCOUNTER — OFFICE VISIT (OUTPATIENT)
Dept: OCCUPATIONAL THERAPY | Facility: CLINIC | Age: 55
End: 2020-07-28
Payer: OTHER MISCELLANEOUS

## 2020-07-28 DIAGNOSIS — M65.331 TRIGGER FINGER, RIGHT MIDDLE FINGER: Primary | ICD-10-CM

## 2020-07-28 PROCEDURE — 97530 THERAPEUTIC ACTIVITIES: CPT | Performed by: OCCUPATIONAL THERAPIST

## 2020-07-28 PROCEDURE — 97140 MANUAL THERAPY 1/> REGIONS: CPT | Performed by: OCCUPATIONAL THERAPIST

## 2020-07-28 PROCEDURE — 97110 THERAPEUTIC EXERCISES: CPT | Performed by: OCCUPATIONAL THERAPIST

## 2020-07-28 NOTE — PROGRESS NOTES
Daily Note     Today's date: 2020  Patient name: Gumaro Degroot  : 1965  MRN: [id number]  Referring provider: Melecio Zamora PA-C  Dx:   Encounter Diagnosis     ICD-10-CM    1  Trigger finger, right middle finger M65 331                   Subjective: "It's less stiff, but still stiff"      Objective: See treatment diary below      Assessment: Better tolerance to extrinsic stretches, improved AROM afterwards        Plan: Continue per plan of care             Manuals 7/21 7/28 6/25 6/26 6/30 7/2 7/7 7/10 7/14  7/16   Scar massage 8' 8' 8 5 8' 8' 8' 8' 8'  8'   Intrinsic stretch     5                                                                 Neuro Re-Ed                       Weight bearing on table  on wall, one hand 30 seconds, 3x  on wall, one hand 30 seconds, 3x       through wedge, 30 seconds, 3x through wedge, 30 seconds, 3x through wedge, 30 seconds, 3x                                                                                                                                                       Ther Ex                       ROM as tolerated 3'      3' 3' 3' 3' 5'     Place and holds 5x                     keypegs    1x   1x with 1 marble             Wall walking  Red ball 8x  2# x 10   1# x 10   1# 10x Red ball 8x     Red ball 8x  Red ball 8x   velcro pinch board    2x in 3jaw                 Digit extension   Extension web orange 3x15 Extension web orange 3x15 lrg rb, TB lrg rb onn Tb 30x Extension web yellow 3x10 Extension web yellow 3x10 Extension web yellow 2x10, orange 2x10  Extension web yellow 2x10, orange 2x10  Extension web orange 3x15  Extension web orange 3x15   Intrinsic roll     Pen 10x 10 sec                 Pinch [place]       RG               Digiflex  green isolated 30x, blue composite 30x  green isolated 30x, blue composite 30x     Red 30x Red 30x Red isolated 30x, green composite 30x Red isolated 30x, green composite 30x green isolated 30x, blue composite 30x  green isolated 30x, blue composite 30x   Reverse blocking     2x 10              into orange putty 2x10   Ther Activity                      Grasping    RPW 2x 10, G 2x 10 G 3x 10 Green 30x    Green 30x  Green 30x       WF/WE/S/P       RPB 3 x 10       GFB 3x10       Jar turning   [place] 10/10         [place] 10/10 [place] 10/10 Orange 10/10 Orange 10/10 Orange 10/10   Pinch + grasp  Green in, out with 5th   Green in, out with 5th    R CP + W 4th R/G CP + W 4th R/G CP + W 4th R/G CP + W 4th G CP + W 4th Green in, out with 5th  Green in, out with 5th    Dough press  in orange putty 2x10   in orange putty 2x10           In orange putty 2x10  In orange putty 2x10   in orange putty 2x10                                                    Modalities                       MHP 5' 5' 10 15 5' 10' 5' 5' 5'  5'

## 2020-07-30 ENCOUNTER — OFFICE VISIT (OUTPATIENT)
Dept: OCCUPATIONAL THERAPY | Facility: CLINIC | Age: 55
End: 2020-07-30
Payer: OTHER MISCELLANEOUS

## 2020-07-30 DIAGNOSIS — M65.331 TRIGGER FINGER, RIGHT MIDDLE FINGER: Primary | ICD-10-CM

## 2020-07-30 PROCEDURE — 97110 THERAPEUTIC EXERCISES: CPT | Performed by: OCCUPATIONAL THERAPIST

## 2020-07-30 PROCEDURE — 97140 MANUAL THERAPY 1/> REGIONS: CPT | Performed by: OCCUPATIONAL THERAPIST

## 2020-07-30 PROCEDURE — 97530 THERAPEUTIC ACTIVITIES: CPT | Performed by: OCCUPATIONAL THERAPIST

## 2020-08-04 ENCOUNTER — APPOINTMENT (OUTPATIENT)
Dept: OCCUPATIONAL THERAPY | Facility: CLINIC | Age: 55
End: 2020-08-04
Payer: OTHER MISCELLANEOUS

## 2020-08-06 ENCOUNTER — OFFICE VISIT (OUTPATIENT)
Dept: OCCUPATIONAL THERAPY | Facility: CLINIC | Age: 55
End: 2020-08-06
Payer: OTHER MISCELLANEOUS

## 2020-08-06 DIAGNOSIS — M65.331 TRIGGER FINGER, RIGHT MIDDLE FINGER: Primary | ICD-10-CM

## 2020-08-06 PROCEDURE — 97530 THERAPEUTIC ACTIVITIES: CPT | Performed by: OCCUPATIONAL THERAPIST

## 2020-08-06 PROCEDURE — 97110 THERAPEUTIC EXERCISES: CPT | Performed by: OCCUPATIONAL THERAPIST

## 2020-08-06 PROCEDURE — 97140 MANUAL THERAPY 1/> REGIONS: CPT | Performed by: OCCUPATIONAL THERAPIST

## 2020-08-06 NOTE — PROGRESS NOTES
Daily Note     Today's date: 2020  Patient name: Sydney Brar  : 1965  MRN: [id number]  Referring provider: Agus Clement PA-C  Dx:   Encounter Diagnosis     ICD-10-CM    1  Trigger finger, right middle finger  M65 331                   Subjective: She states that she saw the doctor and they requested an LMB splint for her PIP joint  It was explained that she was already given this type of splint at a prior visit and it is what she has been using at home and should continue to do so  Objective: See treatment diary below  Assessment: Continues with discomfort at end range stretching  Upgraded today the resistance and length of holds of exercises       Plan: Continue per plan of care             Manuals 7/21 7/28 7/30 8/6 6/30 7/2 7/7 7/10 7/14  7/16   Scar massage 8' 8' 8 8' 8' 8' 8' 8' 8'  8'   Intrinsic stretch                                                                      Neuro Re-Ed                       Weight bearing on table  on wall, one hand 30 seconds, 3x  on wall, one hand 30 seconds, 3x   on wall, one hand 30 seconds, 3x  on wall, one hand 30 seconds, 5x   through wedge, 30 seconds, 3x through wedge, 30 seconds, 3x through wedge, 30 seconds, 3x        Relative motion flexion and extension       3x10 with pen                                                                                                                                       Ther Ex                       ROM as tolerated 3'     5' 3' 3' 3' 3' 5'     Place and holds 5x                     keypegs       1x with 1 marble             Wall walking  Red ball 8x  2# x 10 2# x 10    1# 10x Red ball 8x     Red ball 8x  Red ball 8x   velcro pinch board                    Digit extension   Extension web orange 3x15 Extension web orange 3x15 Extension web orange 3x15 Extension web orange 3x15 Extension web yellow 3x10 Extension web yellow 3x10 Extension web yellow 2x10, orange 2x10  Extension web yellow 2x10, orange 2x10  Extension web orange 3x15  Extension web orange 3x15   Intrinsic roll                      Pinch [place]                      Digiflex  green isolated 30x, blue composite 30x  green isolated 30x, blue composite 30x  green isolated 30x, blue composite 30x  green isolated 30x, blue composite 30x Red 30x Red 30x Red isolated 30x, green composite 30x Red isolated 30x, green composite 30x green isolated 30x, blue composite 30x  green isolated 30x, blue composite 30x   Reverse blocking                   into orange putty 2x10   Ther Activity                      Grasping      Green 30x    Green 30x  Green 30x       WF/WE/S/P              GFB 3x10       Jar turning   [place] 10/10        [place] 10/10 [place] 10/10 Orange 10/10 Orange 10/10 Orange 10/10   Pinch + grasp  Green in, out with 5th   Green in, out with 5th   Green in, out with 5th  blue in, out with 5th  R/G CP + W 4th R/G CP + W 4th R/G CP + W 4th G CP + W 4th Green in, out with 5th  Green in, out with 5th    Dough press  in orange putty 2x10   in orange putty 2x10  in orange putty 2x10  in orange putty 2x10 with 10 second press       In orange putty 2x10  In orange putty 2x10   in orange putty 2x10                                                    Modalities                       MHP 5' 5' 10 5' 5' 10' 5' 5' 5'  5'

## 2020-08-11 ENCOUNTER — OFFICE VISIT (OUTPATIENT)
Dept: OCCUPATIONAL THERAPY | Facility: CLINIC | Age: 55
End: 2020-08-11
Payer: OTHER MISCELLANEOUS

## 2020-08-11 DIAGNOSIS — M65.331 TRIGGER FINGER, RIGHT MIDDLE FINGER: Primary | ICD-10-CM

## 2020-08-11 PROCEDURE — 97110 THERAPEUTIC EXERCISES: CPT

## 2020-08-11 PROCEDURE — 97530 THERAPEUTIC ACTIVITIES: CPT

## 2020-08-11 NOTE — PROGRESS NOTES
Daily Note     Today's date: 2020  Patient name: Maritza Durand  : 1965  MRN: [id number]  Referring provider: Kizzy Freire PA-C  Dx:   Encounter Diagnosis     ICD-10-CM    1  Trigger finger, right middle finger  M65 331                   Subjective: It feels fine today  Objective: See treatment diary below  Assessment: Tolerated session well        Plan: Continue per plan of care             Manuals 7/21 7/28 7/30 8/6 8/11 7/2 7/7 7/10 7/14  7/16   Scar massage 8' 8' 8 8' 5' 8' 8' 8' 8'  8'   Intrinsic stretch                                                                      Neuro Re-Ed                       Weight bearing on table  on wall, one hand 30 seconds, 3x  on wall, one hand 30 seconds, 3x   on wall, one hand 30 seconds, 3x  on wall, one hand 30 seconds, 5x  on wall R hand   30 sec x5 through wedge, 30 seconds, 3x through wedge, 30 seconds, 3x through wedge, 30 seconds, 3x        Relative motion flexion and extension       3x10 with pen                                                                                                                                       Ther Ex                       ROM as tolerated 3'     5' 3' 3' 3' 3' 5'     Place and holds 5x                     Wall walking  Red ball 8x  2# x 10 2# x 10    1# 10x Red ball 8x     Red ball 8x  Red ball 8x   Digit extension   Extension web orange 3x15 Extension web orange 3x15 Extension web orange 3x15 Extension web orange 3x15 Extension web orange 3x10 Extension web yellow 3x10 Extension web yellow 2x10, orange 2x10  Extension web yellow 2x10, orange 2x10  Extension web orange 3x15  Extension web orange 3x15   Intrinsic roll                      Pinch [place]                      Digiflex  green isolated 30x, blue composite 30x  green isolated 30x, blue composite 30x  green isolated 30x, blue composite 30x  green isolated 30x, blue composite 30x Green isol 30x,  Blue comp 30x Red 30x Red isolated 30x, green composite 30x Red isolated 30x, green composite 30x green isolated 30x, blue composite 30x  green isolated 30x, blue composite 30x   Reverse blocking                   into orange putty 2x10   Ther Activity                     Grasping         Green 30x  Green 30x       WF/WE/S/P              GFB 3x10       Jar turning   [place] 10/10        [place] 10/10 [place] 10/10 Orange 10/10 Orange 10/10 Orange 10/10   Pinch + grasp  Green in, out with 5th   Green in, out with 5th   Green in, out with 5th   Blue clip in/out 5th R/G CP + W 4th R/G CP + W 4th G CP + W 4th Green in, out with 5th  Green in, out with 5th    Dough press  in orange putty 2x10   in orange putty 2x10  in orange putty 2x10  in orange putty 2x10 with 10 second press  orange putty 2x10 10 sec press     In orange putty 2x10  In orange putty 2x10   in orange putty 2x10                                                    Modalities                       MHP 5' 5' 10 5' 5' 10' 5' 5' 5'  5'

## 2020-08-13 ENCOUNTER — OFFICE VISIT (OUTPATIENT)
Dept: OCCUPATIONAL THERAPY | Facility: CLINIC | Age: 55
End: 2020-08-13
Payer: OTHER MISCELLANEOUS

## 2020-08-13 DIAGNOSIS — M65.331 TRIGGER FINGER, RIGHT MIDDLE FINGER: Primary | ICD-10-CM

## 2020-08-13 PROCEDURE — 97110 THERAPEUTIC EXERCISES: CPT | Performed by: OCCUPATIONAL THERAPIST

## 2020-08-13 PROCEDURE — 97140 MANUAL THERAPY 1/> REGIONS: CPT | Performed by: OCCUPATIONAL THERAPIST

## 2020-08-13 NOTE — PROGRESS NOTES
Daily Note     Today's date: 2020  Patient name: Yohannes Ruiz  : 1965  MRN: [id number]  Referring provider: Bambi Beavers PA-C  Dx:   Encounter Diagnosis     ICD-10-CM    1  Trigger finger, right middle finger  M65 331                   Subjective: "Just feels like a good stretch"      Objective: See treatment diary below      Assessment: Did well with paired MWM weigt bearing and volar to dorsal mobilization - much less MCP pain reported with weight bearing and grasping      Plan: Continue per plan of care             Manuals 7/21 7/28 7/30 8/6 8/11 8/13 7/7 7/10 7/14  7/16   Scar massage 8' 8' 8 8' 5' 5 8' 8' 8'  8'   Intrinsic stretch                      Grade 3           MCP volar to dorsal 5'            MWM           Weight bearing/ext10            Neuro Re-Ed                       Weight bearing on table  on wall, one hand 30 seconds, 3x  on wall, one hand 30 seconds, 3x   on wall, one hand 30 seconds, 3x  on wall, one hand 30 seconds, 5x  on wall R hand   30 sec x5 on wall R hand   30 sec x5 through wedge, 30 seconds, 3x through wedge, 30 seconds, 3x        Relative motion flexion and extension       3x10 with pen                                                                                                                                       Ther Ex                       ROM as tolerated 3'     5' 3'  3' 3' 5'     Place and holds 5x                     Wall walking  Red ball 8x  2# x 10 2# x 10     Red ball 8x     Red ball 8x  Red ball 8x   Digit extension   Extension web orange 3x15 Extension web orange 3x15 Extension web orange 3x15 Extension web orange 3x15 Extension web orange 3x10 Extension web orange 3x10 Extension web yellow 2x10, orange 2x10  Extension web yellow 2x10, orange 2x10  Extension web orange 3x15  Extension web orange 3x15   Intrinsic roll                      Pinch [place]                      Digiflex  green isolated 30x, blue composite 30x  green isolated 30x, blue composite 30x  green isolated 30x, blue composite 30x  green isolated 30x, blue composite 30x Green isol 30x,  Blue comp 30x  Red isolated 30x, green composite 30x Red isolated 30x, green composite 30x green isolated 30x, blue composite 30x  green isolated 30x, blue composite 30x   Reverse blocking                   into orange putty 2x10   Ther Activity                     Grasping        [place] 20x 5 sec hold, denser ball 2x 20 sec 5 sec hold Green 30x  Green 30x       WF/WE/S/P           GFB 3x 15   GFB 3x10       Jar turning   [place] 10/10         [place] 10/10 [place] 10/10 Orange 10/10 Orange 10/10   Pinch + grasp  Green in, out with 5th   Green in, out with 5th   Green in, out with 5th   Blue clip in/out 5th  R/G CP + W 4th G CP + W 4th Green in, out with 5th  Green in, out with 5th    Dough press  in orange putty 2x10   in orange putty 2x10  in orange putty 2x10  in orange putty 2x10 with 10 second press  orange putty 2x10 10 sec press     In orange putty 2x10  In orange putty 2x10   in orange putty 2x10                                                    Modalities                       MHP 5' 5' 10 5' 5' 5 5' 5' 5'  5'

## 2020-08-17 ENCOUNTER — TELEPHONE (OUTPATIENT)
Dept: FAMILY MEDICINE CLINIC | Facility: CLINIC | Age: 55
End: 2020-08-17

## 2020-08-17 ENCOUNTER — APPOINTMENT (OUTPATIENT)
Dept: OCCUPATIONAL THERAPY | Facility: CLINIC | Age: 55
End: 2020-08-17
Payer: OTHER MISCELLANEOUS

## 2020-08-17 DIAGNOSIS — F41.8 DEPRESSION WITH ANXIETY: ICD-10-CM

## 2020-08-17 RX ORDER — PAROXETINE HYDROCHLORIDE 25 MG/1
25 TABLET, FILM COATED, EXTENDED RELEASE ORAL EVERY MORNING
Qty: 30 TABLET | Refills: 1 | Status: SHIPPED | OUTPATIENT
Start: 2020-08-17 | End: 2020-10-26 | Stop reason: SDUPTHER

## 2020-08-17 NOTE — TELEPHONE ENCOUNTER
Please contact patient  I reviewed her message  I did print prescription for Paxil CR if she would like to look into other pharmacies that will carry the  of her choice  If patient would like to see me later today for evaluation of anxiety-I would be happy to see her at 4:45 p m  In the office or virtually    Thank you

## 2020-08-17 NOTE — TELEPHONE ENCOUNTER
----- Message from Tanja Padilla sent at 8/17/2020 10:35 AM EDT -----  Regarding: FW: Prescription Question  Contact: [phone]    ----- Message -----  From: Camilo Nichols  Sent: 8/17/2020   6:15 AM EDT  To: Bret Shipman [facility]  Subject: Prescription Question                            Dr Wilton Majano    Had to get a refill of my prescript for anxiety  over the wknd from [facility]  Normally its from the mail order company, but was out of refills and pills    Apparently I had one, 30 day script still in their computer  They filled it, but something is different, not working the same  In the past 2 days, my anxiety is starting to over-whelm me  It is the same dosage, but you and I have discussed the difference in manufacturers  Have a court appt on Wed this week, and I NEED to get my anxiety under control  Upping the dosage is not the answer    [facility] or a different [facility] may [facility] the paroxet  HCl cr , perhaps by a different , but have no prescr  , and since I just had one filled, don't know if I can get another one filled this soon    Please let me know you ideas or suggestions ASAP   ! [facility] is the     Domi Ray not working      Dio Leong

## 2020-08-17 NOTE — TELEPHONE ENCOUNTER
Left detailed message for Pt and asked that she call us and schedule a appointment today for 4:45 either in office or virtual  Also that she can  her script and take it to a pharmacy of her choice

## 2020-08-20 ENCOUNTER — APPOINTMENT (OUTPATIENT)
Dept: OCCUPATIONAL THERAPY | Facility: CLINIC | Age: 55
End: 2020-08-20
Payer: OTHER MISCELLANEOUS

## 2020-08-20 NOTE — PROGRESS NOTES
Daily Note     Today's date: 2020  Patient name: Gumaro Degroot  : 1965  MRN: [id number]  Referring provider: Melecio Zamora PA-C  Dx:   Encounter Diagnosis     ICD-10-CM    1  Trigger finger, right middle finger  M65 331                   Subjective: "Just feels like a good stretch"      Objective: See treatment diary below      Assessment: Did well with paired MWM weigt bearing and volar to dorsal mobilization - much less MCP pain reported with weight bearing and grasping      Plan: Continue per plan of care             Manuals 7/21 7/28 7/30 8/6 8/11 8/13 7/7 7/10 7/14  7/16   Scar massage 8' 8' 8 8' 5' 5 8' 8' 8'  8'   Intrinsic stretch                      Grade 3           MCP volar to dorsal 5'            MWM           Weight bearing/ext10            Neuro Re-Ed                       Weight bearing on table  on wall, one hand 30 seconds, 3x  on wall, one hand 30 seconds, 3x   on wall, one hand 30 seconds, 3x  on wall, one hand 30 seconds, 5x  on wall R hand   30 sec x5 on wall R hand   30 sec x5 through wedge, 30 seconds, 3x through wedge, 30 seconds, 3x        Relative motion flexion and extension       3x10 with pen                                                                                                                                       Ther Ex                       ROM as tolerated 3'     5' 3'  3' 3' 5'     Place and holds 5x                     Wall walking  Red ball 8x  2# x 10 2# x 10     Red ball 8x     Red ball 8x  Red ball 8x   Digit extension   Extension web orange 3x15 Extension web orange 3x15 Extension web orange 3x15 Extension web orange 3x15 Extension web orange 3x10 Extension web orange 3x10 Extension web yellow 2x10, orange 2x10  Extension web yellow 2x10, orange 2x10  Extension web orange 3x15  Extension web orange 3x15   Intrinsic roll                      Pinch [place]                      Digiflex  green isolated 30x, blue composite 30x  green isolated 30x, blue composite 30x  green isolated 30x, blue composite 30x  green isolated 30x, blue composite 30x Green isol 30x,  Blue comp 30x  Red isolated 30x, green composite 30x Red isolated 30x, green composite 30x green isolated 30x, blue composite 30x  green isolated 30x, blue composite 30x   Reverse blocking                   into orange putty 2x10   Ther Activity                     Grasping        Ygum 20x 5 sec hold, denser ball 2x 20 sec 5 sec hold Green 30x  Green 30x       WF/WE/S/P           GFB 3x 15   GFB 3x10       Jar turning   [place] 10/10         [place] 10/10 [place] 10/10 Orange 10/10 Orange 10/10   Pinch + grasp  Green in, out with 5th   Green in, out with 5th   Green in, out with 5th   Blue clip in/out 5th  R/G CP + W 4th G CP + W 4th Green in, out with 5th  Green in, out with 5th    Dough press  in orange putty 2x10   in orange putty 2x10  in orange putty 2x10  in orange putty 2x10 with 10 second press  orange putty 2x10 10 sec press     In orange putty 2x10  In orange putty 2x10   in orange putty 2x10                                                    Modalities                       MHP 5' 5' 10 5' 5' 5 5' 5' 5'  5'

## 2020-08-24 ENCOUNTER — OFFICE VISIT (OUTPATIENT)
Dept: OCCUPATIONAL THERAPY | Facility: CLINIC | Age: 55
End: 2020-08-24
Payer: OTHER MISCELLANEOUS

## 2020-08-24 DIAGNOSIS — M65.331 TRIGGER FINGER, RIGHT MIDDLE FINGER: Primary | ICD-10-CM

## 2020-08-24 PROCEDURE — 97530 THERAPEUTIC ACTIVITIES: CPT | Performed by: OCCUPATIONAL THERAPIST

## 2020-08-24 PROCEDURE — 97110 THERAPEUTIC EXERCISES: CPT | Performed by: OCCUPATIONAL THERAPIST

## 2020-08-24 NOTE — PROGRESS NOTES
Daily Note     Today's date: 2020  Patient name: Mariel Cowan  : 1965  MRN: [id number]  Referring provider: Shira Fritz PA-C  Dx:   Encounter Diagnosis     ICD-10-CM    1  Trigger finger, right middle finger  M65 331                   Subjective: "It's less painful"      Objective: See treatment diary below      Assessment: Continued success with MWM - taped with same modification with some success        Plan: Continue per plan of care             Manuals 7/21 7/28 7/30 8/6 8/11 8/13 8/24 7/10 7/14  7/16   Scar massage 8' 8' 8 8' 5' 5  8' 8'  8'   Intrinsic stretch                     Grade 3           MCP volar to dorsal 5' MCP volar to dorsal 5          MWM           Weight bearing/ext10  Weight bearing/ext10          Neuro Re-Ed                      Weight bearing on table  on wall, one hand 30 seconds, 3x  on wall, one hand 30 seconds, 3x   on wall, one hand 30 seconds, 3x  on wall, one hand 30 seconds, 5x  on wall R hand   30 sec x5 on wall R hand   30 sec x5 on wall R hand   30 sec x5 through wedge, 30 seconds, 3x        Relative motion flexion and extension       3x10 with pen                                                                                                                                 Ther Ex                      ROM as tolerated 3'     5' 3'   3' 5'     Place and holds 5x                    Wall walking  Red ball 8x  2# x 10 2# x 10        Red ball 8x  Red ball 8x   Digit extension   Extension web orange 3x15 Extension web orange 3x15 Extension web orange 3x15 Extension web orange 3x15 Extension web orange 3x10 Extension web orange 3x10 Extension web orange 3x10 Extension web yellow 2x10, orange 2x10  Extension web orange 3x15  Extension web orange 3x15   Intrinsic roll                     Pinch [place]                     Digiflex  green isolated 30x, blue composite 30x  green isolated 30x, blue composite 30x  green isolated 30x, blue composite 30x  green isolated 30x, blue composite 30x Green isol 30x,  Blue comp 30x   Red isolated 30x, green composite 30x green isolated 30x, blue composite 30x  green isolated 30x, blue composite 30x   Reverse blocking                  into orange putty 2x10   Ther Activity                    Grasping        Ygum 20x 5 sec hold, denser ball 2x 20 sec 5 sec hold Ygum 20x 5 sec hold, denser ball 2x 20 sec 5 sec hold Green 30x, Ygum 20x 5 sec hold, denser ball 2x 20 sec 5 sec hold       WF/WE/S/P           GFB 3x 15  GFB 3x 15 GFB 3x10       Jar turning   [place] 10/10           [place] 10/10 [place] 10/10   Pinch + grasp  Green in, out with 5th   Green in, out with 5th   Green in, out with 5th   Blue clip in/out 5th    Green in, out with 5th  Green in, out with 5th    Dough press  in orange putty 2x10   in orange putty 2x10  in orange putty 2x10  in orange putty 2x10 with 10 second press  orange putty 2x10 10 sec press     In orange putty 2x10   in orange putty 2x10                                                  Modalities                      MHP 5' 5' 10 5' 5' 5 5' 5' 5'  5'

## 2020-08-27 ENCOUNTER — OFFICE VISIT (OUTPATIENT)
Dept: OCCUPATIONAL THERAPY | Facility: CLINIC | Age: 55
End: 2020-08-27
Payer: OTHER MISCELLANEOUS

## 2020-08-27 DIAGNOSIS — M65.331 TRIGGER FINGER, RIGHT MIDDLE FINGER: Primary | ICD-10-CM

## 2020-08-27 PROCEDURE — 97140 MANUAL THERAPY 1/> REGIONS: CPT | Performed by: OCCUPATIONAL THERAPIST

## 2020-08-27 PROCEDURE — 97110 THERAPEUTIC EXERCISES: CPT | Performed by: OCCUPATIONAL THERAPIST

## 2020-08-27 PROCEDURE — 97530 THERAPEUTIC ACTIVITIES: CPT | Performed by: OCCUPATIONAL THERAPIST

## 2020-08-27 NOTE — PROGRESS NOTES
Daily Note     Today's date: 2020  Patient name: Yohannes Ruiz  : 1965  MRN: [id number]  Referring provider: Bambi Beavers PA-C  Dx:   Encounter Diagnosis     ICD-10-CM    1  Trigger finger, right middle finger  M65 331                   Subjective: "it feels good to do things at work that I haven't been able to do"      Objective: See treatment diary below  Assessment: Less pain overall with extension exercises for the long finger  She feels that MWM and weight bearing improves her functional tolerance towards activity  The tape applied last session fell off quickly and so was not re-applied        Plan: Continue per plan of care             Manuals    Scar massage 8' 8' 8 8' 5' 5  3' 8'  8'   Intrinsic stretch                     Grade 3           MCP volar to dorsal 5' MCP volar to dorsal 5 MCP volar to dorsal 5'        MWM           Weight bearing/ext10  Weight bearing/ext10   Weight bearing at the MCP and the PIP       Neuro Re-Ed                      Weight bearing on table  on wall, one hand 30 seconds, 3x  on wall, one hand 30 seconds, 3x   on wall, one hand 30 seconds, 3x  on wall, one hand 30 seconds, 5x  on wall R hand   30 sec x5 on wall R hand   30 sec x5 on wall R hand   30 sec x5         Relative motion flexion and extension       3x10 with pen               Green ball circles in palm              5x each direction                                                                                                    Ther Ex                      ROM as tolerated 3'     5' 3'   5' 5'     Place and holds 5x                   Wall walking  Red ball 8x  2# x 10 2# x 10       Red ball 8x  Red ball 8x   Digit extension   Extension web orange 3x15 Extension web orange 3x15 Extension web orange 3x15 Extension web orange 3x15 Extension web orange 3x10 Extension web orange 3x10 Extension web orange 3x10 Extension web orange 3x10 Extension web orange 3x15  Extension web orange 3x15   Intrinsic roll                     Pinch [place]                     Digiflex  green isolated 30x, blue composite 30x  green isolated 30x, blue composite 30x  green isolated 30x, blue composite 30x  green isolated 30x, blue composite 30x Green isol 30x,  Blue comp 30x   Blue isolated 30x, black composite 30x green isolated 30x, blue composite 30x  green isolated 30x, blue composite 30x   Reverse blocking                  into orange putty 2x10   Ther Activity                    Grasping        Ygum 20x 5 sec hold, denser ball 2x 20 sec 5 sec hold Ygum 20x 5 sec hold, denser ball 2x 20 sec 5 sec hold        WF/WE/S/P           GFB 3x 15  GFB 3x 15 GFB 3x15       Jar turning   [place] 10/10           [place] 10/10 [place] 10/10   Pinch + grasp  Green in, out with 5th   Green in, out with 5th   Green in, out with 5th   Blue clip in/out 5th    Green in, out with 5th  Green in, out with 5th    Dough press  in orange putty 2x10   in orange putty 2x10  in orange putty 2x10  in orange putty 2x10 with 10 second press  orange putty 2x10 10 sec press    orange putty 2x10 10 sec press In orange putty 2x10   in orange putty 2x10                                                  Modalities                      MHP 5' 5' 10 5' 5' 5 5' 5' 5'  5'

## 2020-09-01 ENCOUNTER — OFFICE VISIT (OUTPATIENT)
Dept: OCCUPATIONAL THERAPY | Facility: CLINIC | Age: 55
End: 2020-09-01
Payer: OTHER MISCELLANEOUS

## 2020-09-01 DIAGNOSIS — M65.331 TRIGGER FINGER, RIGHT MIDDLE FINGER: Primary | ICD-10-CM

## 2020-09-01 PROCEDURE — 97110 THERAPEUTIC EXERCISES: CPT | Performed by: OCCUPATIONAL THERAPIST

## 2020-09-01 PROCEDURE — 97140 MANUAL THERAPY 1/> REGIONS: CPT | Performed by: OCCUPATIONAL THERAPIST

## 2020-09-01 PROCEDURE — 97530 THERAPEUTIC ACTIVITIES: CPT | Performed by: OCCUPATIONAL THERAPIST

## 2020-09-01 NOTE — PROGRESS NOTES
Daily Note     Today's date: 2020  Patient name: Rose Bettencourt  : 1965  MRN: [id number]  Referring provider: Belgica Luu PA-C  Dx:   Encounter Diagnosis     ICD-10-CM    1  Trigger finger, right middle finger  M65 331                   Subjective: "does the scar tissue ever turn to mush?"      Objective: See treatment diary below  Assessment:  Good tolerance  No pain  Slight fatigue with exercises  She sees the doctor in 2 weeks       Plan: Continue per plan of care             Manuals    Scar massage 8' 8' 8 8' 5' 5  3' 3'  8'   Intrinsic stretch                     Grade 3           MCP volar to dorsal 5' MCP volar to dorsal 5 MCP volar to dorsal 5'  MCP volar to dorsal 5'      MWM           Weight bearing/ext10  Weight bearing/ext10   Weight bearing at the MCP and the PIP  Weight bearing at the MCP and the PIP     Neuro Re-Ed                      Weight bearing on table  on wall, one hand 30 seconds, 3x  on wall, one hand 30 seconds, 3x   on wall, one hand 30 seconds, 3x  on wall, one hand 30 seconds, 5x  on wall R hand   30 sec x5 on wall R hand   30 sec x5 on wall R hand   30 sec x5         Relative motion flexion and extension       3x10 with pen               Green ball circles in palm              5x each direction   5x each direction                                                                                                  Ther Ex                      ROM as tolerated 3'     5' 3'   5' 5'     Place and holds 5x                  Wall walking  Red ball 8x  2# x 10 2# x 10         Red ball 8x   Digit extension   Extension web orange 3x15 Extension web orange 3x15 Extension web orange 3x15 Extension web orange 3x15 Extension web orange 3x10 Extension web orange 3x10 Extension web orange 3x10 Extension web orange 3x10 Extension web orange 3x10  Extension web orange 3x15   Intrinsic roll                     Pinch [place]                     Digiflex  green isolated 30x, blue composite 30x  green isolated 30x, blue composite 30x  green isolated 30x, blue composite 30x  green isolated 30x, blue composite 30x Green isol 30x,  Blue comp 30x   Blue isolated 30x, black composite 30x Blue isolated 30x, black composite 30x  green isolated 30x, blue composite 30x   Reverse blocking                  into orange putty 2x10   Ther Activity                    Grasping        Ygum 20x 5 sec hold, denser ball 2x 20 sec 5 sec hold Ygum 20x 5 sec hold, denser ball 2x 20 sec 5 sec hold        WF/WE/S/P           GFB 3x 15  GFB 3x 15 GFB 3x15  GFB 3x15     Jar turning   [place] 10/10            [place] 10/10   Pinch + grasp  Green in, out with 5th   Green in, out with 5th   Green in, out with 5th   Blue clip in/out 5th     Green in, out with 5th    Dough press  in orange putty 2x10   in orange putty 2x10  in orange putty 2x10  in orange putty 2x10 with 10 second press  orange putty 2x10 10 sec press    orange putty 2x10 10 sec press   in orange putty 2x10    Item find in putty                1x                            Modalities                      MHP 5' 5' 10 5' 5' 5 5' 5' 5'  5'

## 2020-09-03 ENCOUNTER — OFFICE VISIT (OUTPATIENT)
Dept: OCCUPATIONAL THERAPY | Facility: CLINIC | Age: 55
End: 2020-09-03
Payer: OTHER MISCELLANEOUS

## 2020-09-03 DIAGNOSIS — M65.331 TRIGGER FINGER, RIGHT MIDDLE FINGER: Primary | ICD-10-CM

## 2020-09-03 PROCEDURE — 97530 THERAPEUTIC ACTIVITIES: CPT | Performed by: OCCUPATIONAL THERAPIST

## 2020-09-03 PROCEDURE — 97140 MANUAL THERAPY 1/> REGIONS: CPT | Performed by: OCCUPATIONAL THERAPIST

## 2020-09-03 PROCEDURE — 97110 THERAPEUTIC EXERCISES: CPT | Performed by: OCCUPATIONAL THERAPIST

## 2020-09-03 NOTE — PROGRESS NOTES
Daily Note     Today's date: 9/3/2020  Patient name: Da Urbina  : 1965  MRN: [id number]  Referring provider: Senait Hightower PA-C  Dx:   Encounter Diagnosis     ICD-10-CM    1  Trigger finger, right middle finger  M65 331                   Subjective: "We need to use a meat tenderizer"       Objective: See treatment diary below  Assessment:  Adjusted LMB to provide more dynamic extension  Full PIP extension with its use       Plan: Continue per plan of care             Manuals 7/21 7/28 7/30 8/6 8/11 8/13 8/24 8/27 9/1  9/3   Scar massage 8' 8' 8 8' 5' 5  3' 3' 3'   Intrinsic stretch                     Grade 3           MCP volar to dorsal 5' MCP volar to dorsal 5 MCP volar to dorsal 5'  MCP volar to dorsal 5'  MCP volar to dorsal 5'    MWM           Weight bearing/ext10  Weight bearing/ext10   Weight bearing at the MCP and the PIP  Weight bearing at the MCP and the PIP Weight bearing at the MCP and the PIP   Neuro Re-Ed                      Weight bearing on table  on wall, one hand 30 seconds, 3x  on wall, one hand 30 seconds, 3x   on wall, one hand 30 seconds, 3x  on wall, one hand 30 seconds, 5x  on wall R hand   30 sec x5 on wall R hand   30 sec x5 on wall R hand   30 sec x5         Relative motion flexion and extension       3x10 with pen               Green ball circles in palm              5x each direction   5x each direction   5x each direction                                                                                                Ther Ex                      ROM as tolerated 3'     5' 3'   5' 5'  5'   Place and holds 5x                  Wall walking  Red ball 8x  2# x 10 2# x 10           Digit extension   Extension web orange 3x15 Extension web orange 3x15 Extension web orange 3x15 Extension web orange 3x15 Extension web orange 3x10 Extension web orange 3x10 Extension web orange 3x10 Extension web orange 3x10 Extension web orange 3x10 Extension web orange 3x10   Intrinsic roll                     Pinch [place]                     Digiflex  green isolated 30x, blue composite 30x  green isolated 30x, blue composite 30x  green isolated 30x, blue composite 30x  green isolated 30x, blue composite 30x Green isol 30x,  Blue comp 30x   Blue isolated 30x, black composite 30x Blue isolated 30x, black composite 30x Blue isolated 30x, black composite 30x   Reverse blocking                    Ther Activity                    Grasping        Ygum 20x 5 sec hold, denser ball 2x 20 sec 5 sec hold Ygum 20x 5 sec hold, denser ball 2x 20 sec 5 sec hold        WF/WE/S/P           GFB 3x 15  GFB 3x 15 GFB 3x15  GFB 3x15     Jar turning   [place] 10/10               Pinch + grasp  Green in, out with 5th   Green in, out with 5th   Green in, out with 5th   Blue clip in/out 5th         Dough press  in orange putty 2x10   in orange putty 2x10  in orange putty 2x10  in orange putty 2x10 with 10 second press  orange putty 2x10 10 sec press    orange putty 2x10 10 sec press   in green putty 2x10    Item find in putty                1x                            Modalities                      MHP 5' 5' 10 5' 5' 5 5' 5' 5'  10

## 2020-09-10 ENCOUNTER — OFFICE VISIT (OUTPATIENT)
Dept: OCCUPATIONAL THERAPY | Facility: CLINIC | Age: 55
End: 2020-09-10
Payer: OTHER MISCELLANEOUS

## 2020-09-10 DIAGNOSIS — M65.331 TRIGGER FINGER, RIGHT MIDDLE FINGER: Primary | ICD-10-CM

## 2020-09-10 PROCEDURE — 97110 THERAPEUTIC EXERCISES: CPT | Performed by: OCCUPATIONAL THERAPIST

## 2020-09-10 PROCEDURE — 97140 MANUAL THERAPY 1/> REGIONS: CPT | Performed by: OCCUPATIONAL THERAPIST

## 2020-09-10 PROCEDURE — 97112 NEUROMUSCULAR REEDUCATION: CPT | Performed by: OCCUPATIONAL THERAPIST

## 2020-09-10 NOTE — PROGRESS NOTES
Daily Note     Today's date: 9/10/2020  Patient name: Alysa Starr  : 1965  MRN: [id number]  Referring provider: Tony Chaudhary PA-C  Dx:   Encounter Diagnosis     ICD-10-CM    1  Trigger finger, right middle finger  M65 331                   Subjective: "It feels pretty good"      Objective: See treatment diary below      Assessment: Updated HEP for more resistive grasp/extension PREs GTP  Doing well, very little pain with WB        Plan: Continue per plan of care             Manuals 9/10 7/28 7/30 8/6 8/11 8/13 8/24 8/27 9/1  9/3   Scar massage 3 8' 8 8' 5' 5  3' 3' 3'   Intrinsic stretch                    Grade 3  MCP volar to dorsal 5'         MCP volar to dorsal 5' MCP volar to dorsal 5 MCP volar to dorsal 5'  MCP volar to dorsal 5'  MCP volar to dorsal 5'    MWM Weight bearing at the MCP and the PIP         Weight bearing/ext10  Weight bearing/ext10   Weight bearing at the MCP and the PIP  Weight bearing at the MCP and the PIP Weight bearing at the MCP and the PIP   Neuro Re-Ed                     Weight bearing on table W/ scale 8x 15 sec  on wall, one hand 30 seconds, 3x   on wall, one hand 30 seconds, 3x  on wall, one hand 30 seconds, 5x  on wall R hand   30 sec x5 on wall R hand   30 sec x5 on wall R hand   30 sec x5         Relative motion flexion and extension      3x10 with pen               Green ball circles in palm  5x each            5x each direction   5x each direction   5x each direction                                                                                                Ther Ex                      ROM as tolerated      5' 3'   5' 5'  5'   Place and holds                   Wall walking   2# x 10 2# x 10           Digit extension  Extension web orange 3x15 Extension web orange 3x15 Extension web orange 3x15 Extension web orange 3x15 Extension web orange 3x10 Extension web orange 3x10 Extension web orange 3x10 Extension web orange 3x10 Extension web orange 3x10 Extension web orange 3x10   Intrinsic roll                    Pinch [place]                    Digiflex Blue isolated 30x, black composite 30x  green isolated 30x, blue composite 30x  green isolated 30x, blue composite 30x  green isolated 30x, blue composite 30x Green isol 30x,  Blue comp 30x   Blue isolated 30x, black composite 30x Blue isolated 30x, black composite 30x Blue isolated 30x, black composite 30x   Reverse blocking                   Ther Activity                    Grasping        Ygum 20x 5 sec hold, denser ball 2x 20 sec 5 sec hold Ygum 20x 5 sec hold, denser ball 2x 20 sec 5 sec hold        WF/WE/S/P  BFB 3x 10         GFB 3x 15  GFB 3x 15 GFB 3x15  GFB 3x15     Jar turning                 Pinch + grasp   Green in, out with 5th   Green in, out with 5th   Blue clip in/out 5th         Dough press   in orange putty 2x10  in orange putty 2x10  in orange putty 2x10 with 10 second press  orange putty 2x10 10 sec press    orange putty 2x10 10 sec press   in green putty 2x10    Item find in putty                1x                            Modalities                      P 5' 5' 10 5' 5' 5 5' 5' 5'  10

## 2020-09-14 ENCOUNTER — OFFICE VISIT (OUTPATIENT)
Dept: OCCUPATIONAL THERAPY | Facility: CLINIC | Age: 55
End: 2020-09-14
Payer: OTHER MISCELLANEOUS

## 2020-09-14 DIAGNOSIS — M65.331 TRIGGER FINGER, RIGHT MIDDLE FINGER: Primary | ICD-10-CM

## 2020-09-14 PROCEDURE — 97110 THERAPEUTIC EXERCISES: CPT | Performed by: OCCUPATIONAL THERAPIST

## 2020-09-14 NOTE — PROGRESS NOTES
Daily Note and Discharge    Today's date: 2020  Patient name: Crescencio Found  : 1965  MRN: [id number]  Referring provider: Seth Kumar PA-C  Dx:   Encounter Diagnosis     ICD-10-CM    1  Trigger finger, right middle finger  M65 331                   Subjective: "the green putty is hard"      Objective: See treatment diary below  R =33 5  LF ROM:  MP=90  PIP=0/88  DIP=90      Assessment: Good improvement in  strength (5lb) and in PIP extension since last measures  She does not demonstrate a PIP extension deficit at this time  Also states minimal pain         Plan: She returns to the doctor for a follow up this week and will schedule as needed             Manuals 9/10 9/14 7/30 8/6 8/11 8/13 8/24 8/27 9/1  9/3   Scar massage 3 5' 8 8' 5' 5  3' 3' 3'   Intrinsic stretch                    Grade 3  MCP volar to dorsal 5'         MCP volar to dorsal 5' MCP volar to dorsal 5 MCP volar to dorsal 5'  MCP volar to dorsal 5'  MCP volar to dorsal 5'    MWM Weight bearing at the MCP and the PIP         Weight bearing/ext10  Weight bearing/ext10   Weight bearing at the MCP and the PIP  Weight bearing at the MCP and the PIP Weight bearing at the MCP and the PIP   Neuro Re-Ed                     Weight bearing on table W/ scale 8x 15 sec     on wall, one hand 30 seconds, 3x  on wall, one hand 30 seconds, 5x  on wall R hand   30 sec x5 on wall R hand   30 sec x5 on wall R hand   30 sec x5         Relative motion flexion and extension      3x10 with pen               Green ball circles in palm  5x each  7x forward and back          5x each direction   5x each direction   5x each direction                                                                                                Ther Ex                      ROM as tolerated      5' 3'   5' 5'  5'   Place and holds                   Wall walking    2# x 10           Digit extension  Extension web orange 3x15 Extension web orange 3x15 Extension web orange 3x15 Extension web orange 3x15 Extension web orange 3x10 Extension web orange 3x10 Extension web orange 3x10 Extension web orange 3x10 Extension web orange 3x10 Extension web orange 3x10   Intrinsic roll                    Pinch [place]                    Digiflex Blue isolated 30x, black composite 30x  Blue isolated 30x, black composite 30x  green isolated 30x, blue composite 30x  green isolated 30x, blue composite 30x Green isol 30x,  Blue comp 30x   Blue isolated 30x, black composite 30x Blue isolated 30x, black composite 30x Blue isolated 30x, black composite 30x   Reverse blocking                   Ther Activity                    Grasping        Ygum 20x 5 sec hold, denser ball 2x 20 sec 5 sec hold Ygum 20x 5 sec hold, denser ball 2x 20 sec 5 sec hold        WF/WE/S/P  BFB 3x 10         GFB 3x 15  GFB 3x 15 GFB 3x15  GFB 3x15     Jar turning                 Pinch + grasp    Green in, out with 5th   Blue clip in/out 5th         Dough press    in orange putty 2x10  in orange putty 2x10 with 10 second press  orange putty 2x10 10 sec press    orange putty 2x10 10 sec press   in green putty 2x10    Item find in putty                1x                            Modalities                      MHP 5' 5' 10 5' 5' 5 5' 5' 5'  10

## 2020-10-14 ENCOUNTER — APPOINTMENT (OUTPATIENT)
Dept: URGENT CARE | Age: 55
End: 2020-10-14
Payer: OTHER MISCELLANEOUS

## 2020-10-14 PROCEDURE — 99283 EMERGENCY DEPT VISIT LOW MDM: CPT | Performed by: PREVENTIVE MEDICINE

## 2020-10-14 PROCEDURE — G0382 LEV 3 HOSP TYPE B ED VISIT: HCPCS | Performed by: PREVENTIVE MEDICINE

## 2020-10-16 ENCOUNTER — APPOINTMENT (OUTPATIENT)
Dept: URGENT CARE | Age: 55
End: 2020-10-16
Payer: OTHER MISCELLANEOUS

## 2020-10-16 PROCEDURE — 99213 OFFICE O/P EST LOW 20 MIN: CPT | Performed by: NURSE PRACTITIONER

## 2020-10-26 ENCOUNTER — TELEPHONE (OUTPATIENT)
Dept: FAMILY MEDICINE CLINIC | Facility: CLINIC | Age: 55
End: 2020-10-26

## 2020-10-26 DIAGNOSIS — F41.8 DEPRESSION WITH ANXIETY: ICD-10-CM

## 2020-10-26 RX ORDER — PAROXETINE HYDROCHLORIDE 25 MG/1
25 TABLET, FILM COATED, EXTENDED RELEASE ORAL EVERY MORNING
Qty: 90 TABLET | Refills: 3 | Status: SHIPPED | OUTPATIENT
Start: 2020-10-26 | End: 2021-12-01

## 2020-10-30 ENCOUNTER — TELEPHONE (OUTPATIENT)
Dept: FAMILY MEDICINE CLINIC | Facility: CLINIC | Age: 55
End: 2020-10-30

## 2021-07-14 ENCOUNTER — OFFICE VISIT (OUTPATIENT)
Dept: FAMILY MEDICINE CLINIC | Facility: CLINIC | Age: 56
End: 2021-07-14
Payer: COMMERCIAL

## 2021-07-14 VITALS
HEART RATE: 66 BPM | BODY MASS INDEX: 22.45 KG/M2 | WEIGHT: 122 LBS | RESPIRATION RATE: 16 BRPM | TEMPERATURE: 98 F | DIASTOLIC BLOOD PRESSURE: 70 MMHG | HEIGHT: 62 IN | SYSTOLIC BLOOD PRESSURE: 120 MMHG | OXYGEN SATURATION: 97 %

## 2021-07-14 DIAGNOSIS — J06.9 VIRAL UPPER RESPIRATORY TRACT INFECTION: Primary | ICD-10-CM

## 2021-07-14 DIAGNOSIS — S90.562A INSECT BITE OF LEFT ANKLE, INITIAL ENCOUNTER: ICD-10-CM

## 2021-07-14 DIAGNOSIS — Z12.11 COLON CANCER SCREENING: ICD-10-CM
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
## 2021-07-14 DIAGNOSIS — Z12.31 BREAST CANCER SCREENING BY MAMMOGRAM: ICD-10-CM

## 2021-07-14 DIAGNOSIS — Z72.0 TOBACCO USE: ICD-10-CM

## 2021-07-14 PROCEDURE — 99213 OFFICE O/P EST LOW 20 MIN: CPT | Performed by: NURSE PRACTITIONER

## 2021-07-14 PROCEDURE — 4004F PT TOBACCO SCREEN RCVD TLK: CPT | Performed by: NURSE PRACTITIONER

## 2021-07-14 RX ORDER — GUAIFENESIN AND CODEINE PHOSPHATE 100; 10 MG/5ML; MG/5ML
SOLUTION ORAL
Qty: 180 ML | Refills: 0 | Status: SHIPPED | OUTPATIENT
Start: 2021-07-14 | End: 2021-08-02

## 2021-07-14 RX ORDER — AMOXICILLIN 875 MG/1
875 TABLET, COATED ORAL 2 TIMES DAILY
Qty: 14 TABLET | Refills: 0 | Status: SHIPPED | OUTPATIENT
Start: 2021-07-14 | End: 2021-07-21

## 2021-07-14 RX ORDER — DIAZEPAM 5 MG/1
TABLET ORAL
COMMUNITY
Start: 2021-06-02 | End: 2021-07-14 | Stop reason: ALTCHOICE

## 2021-07-14 NOTE — PROGRESS NOTES
FAMILY PRACTICE OFFICE VISIT       NAME: Annabel De Leon  AGE: 64 y o  SEX: female       : 1965        MRN: [id number]    Assessment and Plan     Problem List Items Addressed This Visit        Other    Tobacco use      Other Visit Diagnoses     Viral upper respiratory tract infection    -  Primary    Relevant Medications    amoxicillin (AMOXIL) 875 mg tablet    guaifenesin-codeine (GUAIFENESIN AC) 100-10 MG/5ML liquid    Tick bite, initial encounter        Insect bite of left ankle, initial encounter        Breast cancer screening by mammogram        Relevant Orders    Mammo screening bilateral w 3d & cad    Colon cancer screening        Relevant Orders    Cologuard          1  Viral upper respiratory tract infection  amoxicillin (AMOXIL) 875 mg tablet    guaifenesin-codeine (GUAIFENESIN AC) 100-10 MG/5ML liquid   2  Tick bite, initial encounter     3  Insect bite of left ankle, initial encounter     4  Breast cancer screening by mammogram  Mammo screening bilateral w 3d & cad   5  Colon cancer screening  Cologuard   6  Tobacco use       Upper Respiratory Infection: sore throat, cough and rhinorrhea started 2 days ago  Traveled to [place], wore mask during travel  Thick yellow post nasal drip on exam and moist cough  Long term smoking history  Recommend treatment with amoxicillin 875 mg twice daily for 7 days  Cheratussin 5-10 ml every 4-6 hours as needed  Advised to call if not improving or worsening  Tick bite: believes tick was embedded about 2 days, it was not a deer tick  She will monitor for any arthralgias, fatigue, fevers, night sweats, headaches, neck pain  If developing any of these symptoms, she will call  Insect bite of left ankle: suspect left ankle localized itching, swelling and mild redness is from an insect bite   Continue to monitor, may apply OTC hydrocortisone cream     Prescription provided for mammogram     Discussed colon cancer screening, declines colonoscopy, but is willing to complete Cologuard  Tobacco Cessation Counseling: Tobacco cessation counseling and education was provided  The patient is sincerely urged to quit consumption of tobacco  She is not ready to quit tobacco  The numerous health risks of tobacco consumption were discussed  If she decides to quit, there are a number of helpful adjunctive aids, and she can see me to discuss nicotine replacement therapy, chantix, or bupropion anytime in the future  Chief Complaint     Chief Complaint   Patient presents with    Tick Bite    Cough       History of Present Illness     Helen Sanchez is a 64year old female presenting today for tick bite and cough  Noted tick embedded in left lower scalp, behind ear 2 days ago  It was a large tick, not a deer tick  She believes the tick embedded for about 2 days  She was out of town in [facility], and returned home on Saturday  It was this day she was outside picking up branches down in her yard  Cough:  Smoking: down to 7 cigarettes per day, goal is to get down to 5 per day, then plans to use nicotine patch or gum to quit  Productive cough  Nasal congestion: No   Rhinorrhea: yes  Monday night started with sore throat and cough started after this  Chest hurts with cough  Sometimes wheezing  SOB only after coughing  No body aches or fever  +smell and taste intact  No known sick contacts  Took train to [employer] the entire time  Left ankle red swollen, itching, believes she was bit by a bug         Review of Systems   Review of Systems   Constitutional: Negative for chills, diaphoresis, fatigue and fever  HENT: Positive for rhinorrhea and sore throat  Negative for congestion and sinus pressure  Respiratory: Positive for cough and shortness of breath (only after coughing)  Negative for chest tightness and wheezing  Cardiovascular: Positive for chest pain (with cough)  Negative for palpitations and leg swelling     Skin:        Tick bite left scalp  Insect bite on left ankle  Active Problem List     Patient Active Problem List   Diagnosis    Biliary dyskinesia    Depression with anxiety    Dyslipidemia    Insomnia    TMJ arthralgia    Chronic right-sided low back pain with right-sided sciatica    Trigger finger, right middle finger    Trigger finger, right ring finger    Tobacco use       Past Medical History:  Past Medical History:   Diagnosis Date    Headache        Past Surgical History:  No past surgical history on file  Family History:  Family History   Problem Relation Age of Onset    Seizures Sister     Lupus Sister        Social History:  Social History     Socioeconomic History    Marital status: /Civil Union     Spouse name: Not on file    Number of children: Not on file    Years of education: Not on file    Highest education level: Not on file   Occupational History     Employer: [employer]   Tobacco Use    Smoking status: Current Every Day Smoker     Packs/day: 0 50     Years: 25 00     Pack years: 12 50    Smokeless tobacco: Never Used   Substance and Sexual Activity    Alcohol use: Yes     Comment: occasionally    Drug use: No    Sexual activity: Not on file   Other Topics Concern    Not on file   Social History Narrative    Not on file     Social Determinants of Health     Financial Resource Strain:     Difficulty of Paying Living Expenses:    Food Insecurity:     Worried About Running Out of Food in the Last Year:     Ran Out of Food in the Last Year:    Transportation Needs:     Lack of Transportation (Medical):      Lack of Transportation (Non-Medical):    Physical Activity:     Days of Exercise per Week:     Minutes of Exercise per Session:    Stress:     Feeling of Stress :    Social Connections:     Frequency of Communication with Friends and Family:     Frequency of Social Gatherings with Friends and Family:     Attends Christianity Services:     Active Member of Clubs or Organizations:  Attends Club or Organization Meetings:     Marital Status:    Intimate Partner Violence:     Fear of Current or Ex-Partner:     Emotionally Abused:     Physically Abused:     Sexually Abused:        I have reviewed the patient's medical history in detail; there are no changes to the history as noted in the electronic medical record  Objective     Vitals:    07/14/21 1822   BP: 120/70   Pulse: 66   Resp: 16   Temp: 98 °F (36 7 °C)   TempSrc: Temporal   SpO2: 97%   Weight: 55 3 kg (122 lb)   Height: 5' 2" (1 575 m)     Wt Readings from Last 3 Encounters:   07/14/21 55 3 kg (122 lb)   06/04/20 53 3 kg (117 lb 6 4 oz)   03/02/20 51 7 kg (114 lb)     Physical Exam  Vitals and nursing note reviewed  Constitutional:       General: She is not in acute distress  Appearance: Normal appearance  She is not ill-appearing  HENT:      Head: Normocephalic and atraumatic  Right Ear: Tympanic membrane and ear canal normal       Left Ear: Tympanic membrane and ear canal normal       Nose: Nose normal       Mouth/Throat:      Pharynx: Posterior oropharyngeal erythema (mild) present  No pharyngeal swelling  Comments: Yellow post nasal drip  Cardiovascular:      Rate and Rhythm: Normal rate and regular rhythm  Heart sounds: No murmur heard  Pulmonary:      Effort: Pulmonary effort is normal  No respiratory distress  Breath sounds: Normal breath sounds  No wheezing or rales  Comments: Moist cough  Musculoskeletal:      Cervical back: Normal range of motion and neck supple  Lymphadenopathy:      Cervical: No cervical adenopathy  Skin:     Comments: Left scalp behind left ear, 2-3 mm pink tick bite, tender to palpation  Left dorsal lateral ankle mild redness, mild localized swelling  Neurological:      Mental Status: She is alert     Psychiatric:         Mood and Affect: Mood normal             ALLERGIES:  Allergies   Allergen Reactions    Azithromycin Nausea Only    Iodine - Food Allergy Swelling and Edema     Reaction Date: 14Sep2011;     Zoloft [Sertraline]      Worsening of anxiety       Current Medications     Current Outpatient Medications   Medication Sig Dispense Refill    PARoxetine (PAXIL-CR) 25 mg 24 hr tablet Take 1 tablet (25 mg total) by mouth every morning 90 tablet 3    amoxicillin (AMOXIL) 875 mg tablet Take 1 tablet (875 mg total) by mouth 2 (two) times a day for 7 days 14 tablet 0    guaifenesin-codeine (GUAIFENESIN AC) 100-10 MG/5ML liquid Take 5-10 mL every 4-6 hours as needed for cough 180 mL 0     No current facility-administered medications for this visit           Health Maintenance     Health Maintenance   Topic Date Due    Hepatitis C Screening  Never done    Pneumococcal Vaccine: Pediatrics (0 to 5 Years) and At-Risk Patients (6 to 59 Years) (1 of 2 - PPSV23) Never done    COVID-19 Vaccine (1) Never done    HIV Screening  Never done    DTaP,Tdap,and Td Vaccines (1 - Tdap) Never done    Breast Cancer Screening: Mammogram  Never done    Colorectal Cancer Screening  Never done    Annual Physical  03/02/2021    Influenza Vaccine (1) 09/01/2021    BMI: Adult  07/14/2022    Cervical Cancer Screening  10/11/2024    HIB Vaccine  Aged Out    Hepatitis B Vaccine  Aged Out    IPV Vaccine  Aged Out    Hepatitis A Vaccine  Aged Out    Meningococcal ACWY Vaccine  Aged Out    HPV Vaccine  Aged Out     Immunization History   Administered Date(s) Administered    INFLUENZA 11/01/2018    Influenza, recombinant, quadrivalent,injectable, preservative free 12/10/2019    Influenza, seasonal, injectable, preservative free 11/01/2018       HARESH Flood

## 2021-07-18 PROBLEM — Z72.0 TOBACCO USE: Status: ACTIVE | Noted: 2021-07-18

## 2021-07-21 ENCOUNTER — TELEPHONE (OUTPATIENT)
Dept: FAMILY MEDICINE CLINIC | Facility: CLINIC | Age: 56
End: 2021-07-21

## 2021-07-21 DIAGNOSIS — B34.9 VIRAL INFECTION, UNSPECIFIED: Primary | ICD-10-CM

## 2021-07-21 NOTE — TELEPHONE ENCOUNTER
Please ask her to have a COVID-19 swab  I placed orders, she can go to [facility] just for swab  Results typically return in 1-2 days

## 2021-07-21 NOTE — TELEPHONE ENCOUNTER
Patient called, stated that her cough was getting worse she wants to know if there is something she can take for this

## 2021-08-02 ENCOUNTER — OFFICE VISIT (OUTPATIENT)
Dept: FAMILY MEDICINE CLINIC | Facility: CLINIC | Age: 56
End: 2021-08-02
Payer: COMMERCIAL

## 2021-08-02 VITALS
BODY MASS INDEX: 22.08 KG/M2 | DIASTOLIC BLOOD PRESSURE: 66 MMHG | HEIGHT: 62 IN | OXYGEN SATURATION: 98 % | RESPIRATION RATE: 17 BRPM | WEIGHT: 120 LBS | TEMPERATURE: 97.8 F | SYSTOLIC BLOOD PRESSURE: 114 MMHG | HEART RATE: 80 BPM

## 2021-08-02 DIAGNOSIS — E78.5 DYSLIPIDEMIA: ICD-10-CM

## 2021-08-02 DIAGNOSIS — F51.05 INSOMNIA SECONDARY TO ANXIETY: ICD-10-CM

## 2021-08-02 DIAGNOSIS — M25.562 KNEE PAIN, LEFT ANTERIOR: ICD-10-CM

## 2021-08-02 DIAGNOSIS — Z00.00 ENCOUNTER FOR WELLNESS EXAMINATION IN ADULT: Primary | ICD-10-CM

## 2021-08-02 DIAGNOSIS — Z72.0 TOBACCO USE: ICD-10-CM

## 2021-08-02 DIAGNOSIS — F41.9 INSOMNIA SECONDARY TO ANXIETY: ICD-10-CM

## 2021-08-02 DIAGNOSIS — F41.8 DEPRESSION WITH ANXIETY: ICD-10-CM

## 2021-08-02 PROCEDURE — 99396 PREV VISIT EST AGE 40-64: CPT | Performed by: FAMILY MEDICINE

## 2021-08-02 PROCEDURE — 3008F BODY MASS INDEX DOCD: CPT | Performed by: NURSE PRACTITIONER

## 2021-08-02 RX ORDER — HYDROXYZINE HYDROCHLORIDE 25 MG/1
TABLET, FILM COATED ORAL
Qty: 30 TABLET | Refills: 1 | Status: SHIPPED | OUTPATIENT
Start: 2021-08-02 | End: 2022-03-11
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
## 2021-08-02 NOTE — ASSESSMENT & PLAN NOTE
Patient reports worsening of depression, anxiety and insomnia symptoms  She has been using Paxil CR 25 mg for many years, previous trial of Zoloft was not effective  I suggested to switch Paxil to Cymbalta  Patient will consider and will get back to me  For the time being I strongly advised her to start regular exercise, healthy diet, moderate alcohol intake    She will try Atarax 25 -50 mg q h s  p r n  insomnia and  anxiety

## 2021-08-08 PROBLEM — Z00.00 ENCOUNTER FOR WELLNESS EXAMINATION IN ADULT: Status: ACTIVE | Noted: 2021-08-08

## 2021-08-08 NOTE — ASSESSMENT & PLAN NOTE
I advised patient to schedule mammogram and proceed with Cologuard testing       routine blood work ordered    We discussed importance of lifestyle changes, healthy diet, tobacco cessation, exercise, sleep hygiene

## 2021-10-27 ENCOUNTER — OFFICE VISIT (OUTPATIENT)
Dept: OBGYN CLINIC | Facility: MEDICAL CENTER | Age: 56
End: 2021-10-27
Payer: COMMERCIAL

## 2021-10-27 ENCOUNTER — APPOINTMENT (OUTPATIENT)
Dept: RADIOLOGY | Facility: MEDICAL CENTER | Age: 56
End: 2021-10-27
Payer: COMMERCIAL

## 2021-10-27 VITALS
SYSTOLIC BLOOD PRESSURE: 125 MMHG | WEIGHT: 121.6 LBS | DIASTOLIC BLOOD PRESSURE: 77 MMHG | HEART RATE: 80 BPM | BODY MASS INDEX: 22.38 KG/M2 | HEIGHT: 62 IN

## 2021-10-27 DIAGNOSIS — M25.562 KNEE PAIN, LEFT ANTERIOR: ICD-10-CM

## 2021-10-27 DIAGNOSIS — M76.52 PATELLAR TENDINITIS OF LEFT KNEE: ICD-10-CM

## 2021-10-27 DIAGNOSIS — G89.29 CHRONIC PAIN OF LEFT KNEE: Primary | ICD-10-CM

## 2021-10-27 DIAGNOSIS — M25.562 CHRONIC PAIN OF LEFT KNEE: Primary | ICD-10-CM

## 2021-10-27 DIAGNOSIS — M22.2X2 PATELLOFEMORAL PAIN SYNDROME OF LEFT KNEE: ICD-10-CM

## 2021-10-27 DIAGNOSIS — M17.12 ARTHRITIS OF LEFT KNEE: ICD-10-CM

## 2021-10-27 PROCEDURE — 4004F PT TOBACCO SCREEN RCVD TLK: CPT | Performed by: STUDENT IN AN ORGANIZED HEALTH CARE EDUCATION/TRAINING PROGRAM

## 2021-10-27 PROCEDURE — 99214 OFFICE O/P EST MOD 30 MIN: CPT | Performed by: STUDENT IN AN ORGANIZED HEALTH CARE EDUCATION/TRAINING PROGRAM

## 2021-10-27 PROCEDURE — 73564 X-RAY EXAM KNEE 4 OR MORE: CPT

## 2021-10-27 PROCEDURE — 3008F BODY MASS INDEX DOCD: CPT | Performed by: STUDENT IN AN ORGANIZED HEALTH CARE EDUCATION/TRAINING PROGRAM

## 2021-12-01 DIAGNOSIS — F41.8 DEPRESSION WITH ANXIETY: ICD-10-CM

## 2021-12-01 RX ORDER — PAROXETINE HYDROCHLORIDE 25 MG/1
TABLET, FILM COATED, EXTENDED RELEASE ORAL
Qty: 90 TABLET | Refills: 3 | Status: SHIPPED | OUTPATIENT
Start: 2021-12-01

## 2022-03-12 ENCOUNTER — HOSPITAL ENCOUNTER (EMERGENCY)
Facility: HOSPITAL | Age: 57
Discharge: HOME | End: 2022-03-12
Attending: EMERGENCY MEDICINE
Payer: COMMERCIAL

## 2022-03-12 VITALS
SYSTOLIC BLOOD PRESSURE: 128 MMHG | OXYGEN SATURATION: 98 % | DIASTOLIC BLOOD PRESSURE: 69 MMHG | HEART RATE: 76 BPM | TEMPERATURE: 98.2 F | RESPIRATION RATE: 18 BRPM

## 2022-03-12 DIAGNOSIS — R10.13 EPIGASTRIC PAIN: ICD-10-CM

## 2022-03-12 DIAGNOSIS — R63.0 DECREASED APPETITE: ICD-10-CM

## 2022-03-12 DIAGNOSIS — R11.0 NAUSEA: ICD-10-CM

## 2022-03-12 DIAGNOSIS — R51.9 HEADACHE: ICD-10-CM

## 2022-03-12 DIAGNOSIS — M79.10 MYALGIA: Primary | ICD-10-CM

## 2022-03-12 LAB
ALBUMIN SERPL BCP-MCNC: 3.2 G/DL (ref 3.5–5)
ALP SERPL-CCNC: 135 U/L (ref 46–116)
ALT SERPL W P-5'-P-CCNC: 89 U/L (ref 12–78)
ANION GAP SERPL CALCULATED.3IONS-SCNC: 4 MMOL/L (ref 4–13)
AST SERPL W P-5'-P-CCNC: 55 U/L (ref 5–45)
BASOPHILS # BLD AUTO: 0.03 THOUSANDS/ΜL (ref 0–0.1)
BASOPHILS NFR BLD AUTO: 0 % (ref 0–1)
BILIRUB SERPL-MCNC: 0.51 MG/DL (ref 0.2–1)
BUN SERPL-MCNC: 9 MG/DL (ref 5–25)
CALCIUM ALBUM COR SERPL-MCNC: 9.8 MG/DL (ref 8.3–10.1)
CALCIUM SERPL-MCNC: 9.2 MG/DL (ref 8.3–10.1)
CHLORIDE SERPL-SCNC: 103 MMOL/L (ref 100–108)
CO2 SERPL-SCNC: 26 MMOL/L (ref 21–32)
CREAT SERPL-MCNC: 0.63 MG/DL (ref 0.6–1.3)
EOSINOPHIL # BLD AUTO: 0.04 THOUSAND/ΜL (ref 0–0.61)
EOSINOPHIL NFR BLD AUTO: 0 % (ref 0–6)
ERYTHROCYTE [DISTWIDTH] IN BLOOD BY AUTOMATED COUNT: 12.8 % (ref 11.6–15.1)
FLUAV RNA RESP QL NAA+PROBE: NEGATIVE
FLUBV RNA RESP QL NAA+PROBE: NEGATIVE
GFR SERPL CREATININE-BSD FRML MDRD: 99 ML/MIN/1.73SQ M
GLUCOSE SERPL-MCNC: 120 MG/DL (ref 65–140)
HCT VFR BLD AUTO: 36.1 % (ref 34.8–46.1)
HGB BLD-MCNC: 12.6 G/DL (ref 11.5–15.4)
IMM GRANULOCYTES # BLD AUTO: 0.07 THOUSAND/UL (ref 0–0.2)
IMM GRANULOCYTES NFR BLD AUTO: 1 % (ref 0–2)
LIPASE SERPL-CCNC: 40 U/L (ref 73–393)
LYMPHOCYTES # BLD AUTO: 0.93 THOUSANDS/ΜL (ref 0.6–4.47)
LYMPHOCYTES NFR BLD AUTO: 6 % (ref 14–44)
MCH RBC QN AUTO: 31.5 PG (ref 26.8–34.3)
MCHC RBC AUTO-ENTMCNC: 34.9 G/DL (ref 31.4–37.4)
MCV RBC AUTO: 90 FL (ref 82–98)
MONOCYTES # BLD AUTO: 1.43 THOUSAND/ΜL (ref 0.17–1.22)
MONOCYTES NFR BLD AUTO: 10 % (ref 4–12)
NEUTROPHILS # BLD AUTO: 12.41 THOUSANDS/ΜL (ref 1.85–7.62)
NEUTS SEG NFR BLD AUTO: 83 % (ref 43–75)
NRBC BLD AUTO-RTO: 0 /100 WBCS
PLATELET # BLD AUTO: 211 THOUSANDS/UL (ref 149–390)
PMV BLD AUTO: 9.5 FL (ref 8.9–12.7)
POTASSIUM SERPL-SCNC: 4.3 MMOL/L (ref 3.5–5.3)
PROT SERPL-MCNC: 7 G/DL (ref 6.4–8.2)
RBC # BLD AUTO: 4 MILLION/UL (ref 3.81–5.12)
SARS-COV-2 RNA RESP QL NAA+PROBE: NEGATIVE
SODIUM SERPL-SCNC: 133 MMOL/L (ref 136–145)
WBC # BLD AUTO: 14.91 THOUSAND/UL (ref 4.31–10.16)

## 2022-03-12 PROCEDURE — 85025 COMPLETE CBC W/AUTO DIFF WBC: CPT

## 2022-03-12 PROCEDURE — 36415 COLL VENOUS BLD VENIPUNCTURE: CPT

## 2022-03-12 PROCEDURE — 99283 EMERGENCY DEPT VISIT LOW MDM: CPT

## 2022-03-12 PROCEDURE — 87636 SARSCOV2 & INF A&B AMP PRB: CPT

## 2022-03-12 PROCEDURE — 96361 HYDRATE IV INFUSION ADD-ON: CPT

## 2022-03-12 PROCEDURE — 96374 THER/PROPH/DIAG INJ IV PUSH: CPT

## 2022-03-12 PROCEDURE — 80053 COMPREHEN METABOLIC PANEL: CPT

## 2022-03-12 PROCEDURE — 99285 EMERGENCY DEPT VISIT HI MDM: CPT | Performed by: EMERGENCY MEDICINE

## 2022-03-12 PROCEDURE — 83690 ASSAY OF LIPASE: CPT

## 2022-03-12 RX ORDER — LIDOCAINE HYDROCHLORIDE 20 MG/ML
15 SOLUTION OROPHARYNGEAL ONCE
Status: COMPLETED | OUTPATIENT
Start: 2022-03-12 | End: 2022-03-12

## 2022-03-12 RX ORDER — ONDANSETRON 2 MG/ML
4 INJECTION INTRAMUSCULAR; INTRAVENOUS ONCE
Status: COMPLETED | OUTPATIENT
Start: 2022-03-12 | End: 2022-03-12

## 2022-03-12 RX ORDER — PANTOPRAZOLE SODIUM 20 MG/1
20 TABLET, DELAYED RELEASE ORAL DAILY
Qty: 20 TABLET | Refills: 0 | Status: SHIPPED | OUTPATIENT
Start: 2022-03-12 | End: 2022-03-14

## 2022-03-12 RX ORDER — ACETAMINOPHEN 325 MG/1
650 TABLET ORAL ONCE
Status: DISCONTINUED | OUTPATIENT
Start: 2022-03-12 | End: 2022-03-12

## 2022-03-12 RX ORDER — MAGNESIUM HYDROXIDE/ALUMINUM HYDROXICE/SIMETHICONE 120; 1200; 1200 MG/30ML; MG/30ML; MG/30ML
30 SUSPENSION ORAL ONCE
Status: COMPLETED | OUTPATIENT
Start: 2022-03-12 | End: 2022-03-12

## 2022-03-12 RX ORDER — ACETAMINOPHEN 325 MG/1
975 TABLET ORAL ONCE
Status: COMPLETED | OUTPATIENT
Start: 2022-03-12 | End: 2022-03-12

## 2022-03-12 RX ADMIN — ONDANSETRON 4 MG: 2 INJECTION INTRAMUSCULAR; INTRAVENOUS at 10:43

## 2022-03-12 RX ADMIN — ALUMINA, MAGNESIA, AND SIMETHICONE ORAL SUSPENSION REGULAR STRENGTH 30 ML: 1200; 1200; 120 SUSPENSION ORAL at 10:43

## 2022-03-12 RX ADMIN — ACETAMINOPHEN 975 MG: 325 TABLET ORAL at 10:43

## 2022-03-12 RX ADMIN — LIDOCAINE HYDROCHLORIDE 15 ML: 20 SOLUTION ORAL; TOPICAL at 10:43

## 2022-03-12 RX ADMIN — SODIUM CHLORIDE 1000 ML: 0.9 INJECTION, SOLUTION INTRAVENOUS at 10:42

## 2022-03-12 NOTE — ED PROVIDER NOTES
History  Chief Complaint   Patient presents with    Chills     pt c/o chills and weakness for a few days     61 yo F w/ no significant past medical history presents w/ myalgias, headache, decreased appetite, fatigue, and epigastric pain x 4 days  Pain described as vague onset, dull, mild, constant, and non-radiating  Headache is occipital, radiates to the forehead, dull, and 5/10 in severity  Unsure if food affects pain  Tolerating fluids at home  Denies vomiting, diarrhea, URI sxs, chest pain, or dyspnea  Pt not vaccinated for influenza or COVID  Prior to Admission Medications   Prescriptions Last Dose Informant Patient Reported? Taking? PARoxetine (PAXIL-CR) 25 mg 24 hr tablet   No No   Sig: TAKE 1 TABLET BY MOUTH EVERY MORNING      Facility-Administered Medications: None       Past Medical History:   Diagnosis Date    Headache        History reviewed  No pertinent surgical history  Family History   Problem Relation Age of Onset    Seizures Sister     Lupus Sister      I have reviewed and agree with the history as documented  E-Cigarette/Vaping    E-Cigarette Use Never User      E-Cigarette/Vaping Substances    Nicotine No     THC No      Social History     Tobacco Use    Smoking status: Current Every Day Smoker     Packs/day: 0 50     Years: 25 00     Pack years: 12 50    Smokeless tobacco: Never Used   Vaping Use    Vaping Use: Never used   Substance Use Topics    Alcohol use: Yes     Comment: occasionally    Drug use: No        Review of Systems   All other systems reviewed and are negative        Physical Exam  ED Triage Vitals [03/12/22 0946]   Temperature Pulse Respirations Blood Pressure SpO2   98 2 °F (36 8 °C) 83 18 132/61 94 %      Temp src Heart Rate Source Patient Position - Orthostatic VS BP Location FiO2 (%)   -- Monitor Lying Right arm --      Pain Score       7             Orthostatic Vital Signs  Vitals:    03/12/22 0946 03/12/22 1100   BP: 132/61 128/69   Pulse: 83 76 Patient Position - Orthostatic VS: Lying Lying       Physical Exam  Vitals and nursing note reviewed  Constitutional:       General: She is not in acute distress  Appearance: Normal appearance  She is normal weight  She is not ill-appearing, toxic-appearing or diaphoretic  HENT:      Head: Normocephalic and atraumatic  Right Ear: External ear normal       Left Ear: External ear normal       Nose: Nose normal  No congestion  Mouth/Throat:      Mouth: Mucous membranes are moist       Pharynx: Oropharynx is clear  Eyes:      General: No scleral icterus  Right eye: No discharge  Left eye: No discharge  Extraocular Movements: Extraocular movements intact  Conjunctiva/sclera: Conjunctivae normal       Pupils: Pupils are equal, round, and reactive to light  Cardiovascular:      Rate and Rhythm: Normal rate and regular rhythm  Pulses: Normal pulses  Heart sounds: Normal heart sounds  No murmur heard  No friction rub  Pulmonary:      Effort: Pulmonary effort is normal  No respiratory distress  Breath sounds: Normal breath sounds  No wheezing or rales  Abdominal:      General: Abdomen is flat  Bowel sounds are normal  There is no distension  Palpations: Abdomen is soft  Tenderness: There is abdominal tenderness  There is no right CVA tenderness, left CVA tenderness or guarding  Comments: Tenderness in epigastric, RUQ  + Kumar's  Musculoskeletal:         General: Normal range of motion  Cervical back: Normal range of motion and neck supple  No rigidity or tenderness  Skin:     General: Skin is warm and dry  Capillary Refill: Capillary refill takes less than 2 seconds  Coloration: Skin is not jaundiced or pale  Neurological:      General: No focal deficit present  Mental Status: She is alert and oriented to person, place, and time  Cranial Nerves: No cranial nerve deficit  Sensory: No sensory deficit  Motor: No weakness        Coordination: Coordination normal    Psychiatric:         Mood and Affect: Mood normal          Behavior: Behavior normal          ED Medications  Medications   sodium chloride 0 9 % bolus 1,000 mL (0 mL Intravenous Stopped 3/12/22 1210)   aluminum-magnesium hydroxide-simethicone (MYLANTA) oral suspension 30 mL (30 mL Oral Given 3/12/22 1043)   Lidocaine Viscous HCl (XYLOCAINE) 2 % mucosal solution 15 mL (15 mL Swish & Swallow Given 3/12/22 1043)   acetaminophen (TYLENOL) tablet 975 mg (975 mg Oral Given 3/12/22 1043)   ondansetron (ZOFRAN) injection 4 mg (4 mg Intravenous Given 3/12/22 1043)       Diagnostic Studies  Results Reviewed     Procedure Component Value Units Date/Time    Comprehensive metabolic panel [[id number]]  (Abnormal) Collected: 03/12/22 1041    Lab Status: Final result Specimen: Blood from Arm, Left Updated: 03/12/22 1124     Sodium 133 mmol/L      Potassium 4 3 mmol/L      Chloride 103 mmol/L      CO2 26 mmol/L      ANION GAP 4 mmol/L      BUN 9 mg/dL      Creatinine 0 63 mg/dL      Glucose 120 mg/dL      Calcium 9 2 mg/dL      Corrected Calcium 9 8 mg/dL      AST 55 U/L      ALT 89 U/L      Alkaline Phosphatase 135 U/L      Total Protein 7 0 g/dL      Albumin 3 2 g/dL      Total Bilirubin 0 51 mg/dL      eGFR 99 ml/min/1 73sq m     Narrative:      [place] guidelines for Chronic Kidney Disease (CKD):     Stage 1 with normal or high GFR (GFR > 90 mL/min/1 73 square meters)    Stage 2 Mild CKD (GFR = 60-89 mL/min/1 73 square meters)    Stage 3A Moderate CKD (GFR = 45-59 mL/min/1 73 square meters)    Stage 3B Moderate CKD (GFR = 30-44 mL/min/1 73 square meters)    Stage 4 Severe CKD (GFR = 15-29 mL/min/1 73 square meters)    Stage 5 End Stage CKD (GFR <15 mL/min/1 73 square meters)  Note: GFR calculation is accurate only with a steady state creatinine    Lipase [[id number]]  (Abnormal) Collected: 03/12/22 1041    Lab Status: Final result Specimen: Blood from Arm, Left Updated: 03/12/22 1121     Lipase 40 u/L     CBC and differential [[id number]]  (Abnormal) Collected: 03/12/22 1041    Lab Status: Final result Specimen: Blood from Arm, Left Updated: 03/12/22 1055     WBC 14 91 Thousand/uL      RBC 4 00 Million/uL      Hemoglobin 12 6 g/dL      Hematocrit 36 1 %      MCV 90 fL      MCH 31 5 pg      MCHC 34 9 g/dL      RDW 12 8 %      MPV 9 5 fL      Platelets 710 Thousands/uL      nRBC 0 /100 WBCs      Neutrophils Relative 83 %      Immat GRANS % 1 %      Lymphocytes Relative 6 %      Monocytes Relative 10 %      Eosinophils Relative 0 %      Basophils Relative 0 %      Neutrophils Absolute 12 41 Thousands/µL      Immature Grans Absolute 0 07 Thousand/uL      Lymphocytes Absolute 0 93 Thousands/µL      Monocytes Absolute 1 43 Thousand/µL      Eosinophils Absolute 0 04 Thousand/µL      Basophils Absolute 0 03 Thousands/µL     COVID/FLU - 24 hour TAT [[id number]] Collected: 03/12/22 1041    Lab Status:  In process Specimen: Nares from Nose Updated: 03/12/22 1052                 No orders to display         Procedures  POC Biliary US    Date/Time: 3/12/2022 10:39 AM  Performed by: Viktoria Green MD  Authorized by: Da Pfeiffer MD     Patient location:  ED  Performed by:  Resident  Other Assisting Provider: Yes (comment) Da Pfeiffer)    Procedure details:     Exam Type:  Diagnostic    Indications: upper right quadrant abdominal pain      Assessment for:  Cholecystitis and cholelithiasis    Views obtained: gallbladder (transverse and longitudinal), common bile duct and portal triad      Image quality: diagnostic      Image availability:  Images available in PACS and still images obtained  Findings:     Cholelithiasis: not identified      Common bile duct:  Normal    Gallbladder wall:  Normal    Pericholecystic fluid: not identified      Sonographic Kumar's sign: positive    Interpretation:     Biliary ultrasound impressions: normal gallbladder ultrasound            ED Course  ED Course as of 03/12/22 1441   Sat Mar 12, 2022   1128 Pt reports improvement in symptoms                             SBIRT 20yo+      Most Recent Value   SBIRT (25 yo +)    In order to provide better care to our patients, we are screening all of our patients for alcohol and drug use  Would it be okay to ask you these screening questions? Yes Filed at: 03/12/2022 0947   Initial Alcohol Screen: US AUDIT-C     1  How often do you have a drink containing alcohol? 0 Filed at: 03/12/2022 0947   2  How many drinks containing alcohol do you have on a typical day you are drinking? 0 Filed at: 03/12/2022 0947   3a  Male UNDER 65: How often do you have five or more drinks on one occasion? 0 Filed at: 03/12/2022 0947   3b  FEMALE Any Age, or MALE 65+: How often do you have 4 or more drinks on one occassion? 0 Filed at: 03/12/2022 0947   Audit-C Score 0 Filed at: 03/12/2022 6991   SOWMYA: How many times in the past year have you    Used an illegal drug or used a prescription medication for non-medical reasons? Never Filed at: 03/12/2022 0947                MDM  Number of Diagnoses or Management Options  Decreased appetite: new and requires workup  Epigastric pain: new and requires workup  Headache: new and requires workup  Myalgia: new and requires workup  Nausea: new and requires workup  Diagnosis management comments: Impression: non-toxic appearing, otherwise healthy 63 yo F presents w/ flu-like sxs  Mild epigastric and RUQ tenderness  Plan: CBC, CMP, lipase, COVID/influenza, POC biliary US, analgesia    Workup negative for acute pathology  Pt notes significant improvement in sxs  Feels OK for discharge w/ script for PPI  Follow up with PCP         Amount and/or Complexity of Data Reviewed  Clinical lab tests: ordered and reviewed  Review and summarize past medical records: yes  Independent visualization of images, tracings, or specimens: yes    Risk of Complications, Morbidity, and/or Mortality  Presenting problems: low  Diagnostic procedures: minimal  Management options: minimal    Patient Progress  Patient progress: improved      Disposition  Final diagnoses:   Myalgia   Decreased appetite   Epigastric pain   Headache   Nausea     Time reflects when diagnosis was documented in both MDM as applicable and the Disposition within this note     Time User Action Codes Description Comment    3/12/2022 11:34 AM Shira Sukhi Add [J41 19] Myalgia     3/12/2022 11:34 AM Shira Sukhi Add [R63 0] Decreased appetite     3/12/2022 11:35 AM Shira Sukhi Add [R10 13] Epigastric pain     3/12/2022 11:35 AM Shira Sukhi Add [R51 9] Headache     3/12/2022 11:35 AM Shira Sukhi Add [R11 0] Nausea       ED Disposition     ED Disposition Condition Date/Time Comment    Discharge Stable Sat Mar 12, 2022 11:38 AM Chris Head discharge to home/self care  Follow-up Information     Follow up With Specialties Details Why Contact Info    Belgica Martino MD Family Medicine Call  If symptoms worsen [address]  [phone]            Discharge Medication List as of 3/12/2022 11:39 AM      START taking these medications    Details   pantoprazole (PROTONIX) 20 mg tablet Take 1 tablet (20 mg total) by mouth daily for 20 days, Starting Sat 3/12/2022, Until Fri 4/1/2022, Normal         CONTINUE these medications which have NOT CHANGED    Details   PARoxetine (PAXIL-CR) 25 mg 24 hr tablet TAKE 1 TABLET BY MOUTH EVERY MORNING, Normal           No discharge procedures on file  PDMP Review       Value Time User    PDMP Reviewed  Yes 7/14/2021  7:14 PM [facility]           ED Provider  Attending physically available and evaluated Chris Head  RAÚL managed the patient along with the ED Attending      Electronically Signed by         Michelle Muhammad MD  03/12/22 6617

## 2022-03-12 NOTE — ED ATTENDING ATTESTATION
Final Diagnosis:  1  Myalgia    2  Decreased appetite    3  Epigastric pain    4  Headache    5  Nausea           ICheikh MD, saw and evaluated the patient  All available labs and X-rays were ordered by me or the resident and have been reviewed by myself  I discussed the patient with the resident / non-physician and agree with the resident's / non-physician practitioner's findings and plan as documented in the resident's / non-physician practicitioner's note, except where noted  At this point, I agree with the current assessment done in the ED  I was present during key portions of all procedures performed unless otherwise stated  Chief Complaint   Patient presents with    Chills     pt c/o chills and weakness for a few days     This is a 62 y o  female presenting for evaluation of 4 days of myalgias, decreased appetite, gradually worsening with epigastric severe pain  No f/ch/cp/sob  No hx of similar  No flu or COVID vaccine  Supposed to see PCP yesterday but couldn't   +motrin use at home, 2 pills 2-3x/day  Unclear if worsened by food or not  PMH:   has a past medical history of Headache  PSH:   has no past surgical history on file  Social:  Social History     Substance and Sexual Activity   Alcohol Use Yes    Comment: occasionally     Social History     Tobacco Use   Smoking Status Current Every Day Smoker    Packs/day: 0 50    Years: 25 00    Pack years: 12 50   Smokeless Tobacco Never Used     Social History     Substance and Sexual Activity   Drug Use No     PE:  Vitals:    03/12/22 0946 03/12/22 1100   BP: 132/61 128/69   BP Location: Right arm Right arm   Pulse: 83 76   Resp: 18 18   Temp: 98 2 °F (36 8 °C)    SpO2: 94% 98%   General: VSS, NAD, awake, alert  Well-nourished, well-developed  Appears stated age  Head: Normocephalic, atraumatic, nontender  Eyes: PERRL, EOM-I  No diplopia  No hyphema  No subconjunctival hemorrhages  Symmetrical lids  ENTAtraumatic external nose and ears  MMM  No stridor  Normal phonation  No drooling  Base of mouth is soft  No mastoid tenderness  Neck: Symmetric, trachea midline  No JVD  CV: Peripheral pulses +2 throughout  No chest wall tenderness  Lungs:   Unlabored   No retractions  No crepitus  No tachypnea  No paradoxical motion  Abd: +BS, soft, moderate RUQ tenderness; severe epigastric tenderness  Rest of abdomen is non-tender   ND    MSK:   FROM   No lower extremity edema  Back:   No CVAT  Skin: Dry, intact  Neuro: AAOx3, GCS 15, CN II-XII grossly intact  Motor grossly intact  Psychiatric/Behavioral: Appropriate mood and affect   Exam: deferred  A:  - gastritis? Viral syndrome  - abdominal pain  P:  - Viral syndrome?   - Flu/covid test  - labs  - fluids  - toradol  - POCUS U/S  - low threshold to CT given how much discomfort she has; especially if we can't control her pain  ,     - 13 point ROS was performed and all are normal unless stated in the history above  - Nursing note reviewed  Vitals reviewed  - Orders placed by myself and/or advanced practitioner / resident     - Previous chart was reviewed  - No language barrier    - History obtained from patient  - There are no limitations to the history obtained  - Critical care time: Not applicable for this patient       Code Status: No Order  Advance Directive and Living Will:      Power of :    POLST:      Medications   sodium chloride 0 9 % bolus 1,000 mL (0 mL Intravenous Stopped 3/12/22 1210)   aluminum-magnesium hydroxide-simethicone (MYLANTA) oral suspension 30 mL (30 mL Oral Given 3/12/22 1043)   Lidocaine Viscous HCl (XYLOCAINE) 2 % mucosal solution 15 mL (15 mL Swish & Swallow Given 3/12/22 1043)   acetaminophen (TYLENOL) tablet 975 mg (975 mg Oral Given 3/12/22 1043)   ondansetron (ZOFRAN) injection 4 mg (4 mg Intravenous Given 3/12/22 1043)     No orders to display     Orders Placed This Encounter   Procedures POC Biliary US    COVID/FLU - 24 hour TAT    CBC and differential    Comprehensive metabolic panel    Lipase     Labs Reviewed   CBC AND DIFFERENTIAL - Abnormal       Result Value Ref Range Status    WBC 14 91 (*) 4 31 - 10 16 Thousand/uL Final    RBC 4 00  3 81 - 5 12 Million/uL Final    Hemoglobin 12 6  11 5 - 15 4 g/dL Final    Hematocrit 36 1  34 8 - 46 1 % Final    MCV 90  82 - 98 fL Final    MCH 31 5  26 8 - 34 3 pg Final    MCHC 34 9  31 4 - 37 4 g/dL Final    RDW 12 8  11 6 - 15 1 % Final    MPV 9 5  8 9 - 12 7 fL Final    Platelets 264  570 - 390 Thousands/uL Final    nRBC 0  /100 WBCs Final    Neutrophils Relative 83 (*) 43 - 75 % Final    Immat GRANS % 1  0 - 2 % Final    Lymphocytes Relative 6 (*) 14 - 44 % Final    Monocytes Relative 10  4 - 12 % Final    Eosinophils Relative 0  0 - 6 % Final    Basophils Relative 0  0 - 1 % Final    Neutrophils Absolute 12 41 (*) 1 85 - 7 62 Thousands/µL Final    Immature Grans Absolute 0 07  0 00 - 0 20 Thousand/uL Final    Lymphocytes Absolute 0 93  0 60 - 4 47 Thousands/µL Final    Monocytes Absolute 1 43 (*) 0 17 - 1 22 Thousand/µL Final    Eosinophils Absolute 0 04  0 00 - 0 61 Thousand/µL Final    Basophils Absolute 0 03  0 00 - 0 10 Thousands/µL Final   COMPREHENSIVE METABOLIC PANEL - Abnormal    Sodium 133 (*) 136 - 145 mmol/L Final    Potassium 4 3  3 5 - 5 3 mmol/L Final    Chloride 103  100 - 108 mmol/L Final    CO2 26  21 - 32 mmol/L Final    ANION GAP 4  4 - 13 mmol/L Final    BUN 9  5 - 25 mg/dL Final    Creatinine 0 63  0 60 - 1 30 mg/dL Final    Comment: Standardized to IDMS reference method    Glucose 120  65 - 140 mg/dL Final    Comment: If the patient is fasting, the ADA then defines impaired fasting glucose as > 100 mg/dL and diabetes as > or equal to 123 mg/dL  Specimen collection should occur prior to Sulfasalazine administration due to the potential for falsely depressed results   Specimen collection should occur prior to Sulfapyridine administration due to the potential for falsely elevated results  Calcium 9 2  8 3 - 10 1 mg/dL Final    Corrected Calcium 9 8  8 3 - 10 1 mg/dL Final    AST 55 (*) 5 - 45 U/L Final    Comment: Specimen collection should occur prior to Sulfasalazine administration due to the potential for falsely depressed results  ALT 89 (*) 12 - 78 U/L Final    Comment: Specimen collection should occur prior to Sulfasalazine and/or Sulfapyridine administration due to the potential for falsely depressed results  Alkaline Phosphatase 135 (*) 46 - 116 U/L Final    Total Protein 7 0  6 4 - 8 2 g/dL Final    Albumin 3 2 (*) 3 5 - 5 0 g/dL Final    Total Bilirubin 0 51  0 20 - 1 00 mg/dL Final    Comment: Use of this assay is not recommended for patients undergoing treatment with eltrombopag due to the potential for falsely elevated results      eGFR 99  ml/min/1 73sq m Final    Narrative:     [place] guidelines for Chronic Kidney Disease (CKD):     Stage 1 with normal or high GFR (GFR > 90 mL/min/1 73 square meters)    Stage 2 Mild CKD (GFR = 60-89 mL/min/1 73 square meters)    Stage 3A Moderate CKD (GFR = 45-59 mL/min/1 73 square meters)    Stage 3B Moderate CKD (GFR = 30-44 mL/min/1 73 square meters)    Stage 4 Severe CKD (GFR = 15-29 mL/min/1 73 square meters)    Stage 5 End Stage CKD (GFR <15 mL/min/1 73 square meters)  Note: GFR calculation is accurate only with a steady state creatinine   LIPASE - Abnormal    Lipase 40 (*) 73 - 393 u/L Final     Time reflects when diagnosis was documented in both MDM as applicable and the Disposition within this note       Time User Action Codes Description Comment    3/12/2022 11:34 AM Jesus Sabal Add [M79 10] Myalgia     3/12/2022 11:34 AM Jesus Sabal Add [R63 0] Decreased appetite     3/12/2022 11:35 AM Jesus Sabal Add [R10 13] Epigastric pain     3/12/2022 11:35 AM Jesus Sabal Add [R51 9] Headache     3/12/2022 11:35 AM Jesus Sabal Add [R11 0] Nausea ED Disposition       ED Disposition Condition Date/Time Comment    Discharge Stable Sat Mar 12, 2022 11:38 AM Salvatore Smith discharge to home/self care  Follow-up Information       Follow up With Specialties Details Why Contact Info    Keiko Martinez MD Family Medicine Call  If symptoms worsen [address]  [phone]            Discharge Medication List as of 3/12/2022 11:39 AM        START taking these medications    Details   pantoprazole (PROTONIX) 20 mg tablet Take 1 tablet (20 mg total) by mouth daily for 20 days, Starting Sat 3/12/2022, Until Fri 4/1/2022, Normal           CONTINUE these medications which have NOT CHANGED    Details   PARoxetine (PAXIL-CR) 25 mg 24 hr tablet TAKE 1 TABLET BY MOUTH EVERY MORNING, Normal           No discharge procedures on file  Prior to Admission Medications   Prescriptions Last Dose Informant Patient Reported? Taking? PARoxetine (PAXIL-CR) 25 mg 24 hr tablet   No No   Sig: TAKE 1 TABLET BY MOUTH EVERY MORNING      Facility-Administered Medications: None       Portions of the record may have been created with voice recognition software  Occasional wrong word or "sound a like" substitutions may have occurred due to the inherent limitations of voice recognition software  Read the chart carefully and recognize, using context, where substitutions have occurred      Electronically signed by:  Flash Perez

## 2022-03-12 NOTE — DISCHARGE INSTRUCTIONS
Follow up with you primary doctor  Take tylenol 500 mg every 6 hours as needed for aches  Stay well-hydrated  Return to ER if you experience worsening of symptoms, uncontrolled nausea or vomiting, or abdominal pain

## 2022-03-14 ENCOUNTER — OFFICE VISIT (OUTPATIENT)
Dept: FAMILY MEDICINE CLINIC | Facility: CLINIC | Age: 57
End: 2022-03-14
Payer: COMMERCIAL

## 2022-03-14 ENCOUNTER — APPOINTMENT (OUTPATIENT)
Dept: RADIOLOGY | Facility: CLINIC | Age: 57
End: 2022-03-14
Payer: COMMERCIAL

## 2022-03-14 ENCOUNTER — NURSE TRIAGE (OUTPATIENT)
Dept: OTHER | Facility: OTHER | Age: 57
End: 2022-03-14

## 2022-03-14 VITALS
DIASTOLIC BLOOD PRESSURE: 60 MMHG | BODY MASS INDEX: 22.82 KG/M2 | TEMPERATURE: 97.8 F | HEIGHT: 62 IN | OXYGEN SATURATION: 95 % | WEIGHT: 124 LBS | RESPIRATION RATE: 16 BRPM | SYSTOLIC BLOOD PRESSURE: 92 MMHG | HEART RATE: 69 BPM

## 2022-03-14 DIAGNOSIS — R68.83 CHILLS: ICD-10-CM

## 2022-03-14 DIAGNOSIS — R68.83 CHILLS: Primary | ICD-10-CM

## 2022-03-14 DIAGNOSIS — D72.829 LEUKOCYTOSIS, UNSPECIFIED TYPE: ICD-10-CM

## 2022-03-14 DIAGNOSIS — R79.89 ELEVATED LIVER FUNCTION TESTS: ICD-10-CM

## 2022-03-14 DIAGNOSIS — R10.13 EPIGASTRIC PAIN: ICD-10-CM

## 2022-03-14 PROCEDURE — 99214 OFFICE O/P EST MOD 30 MIN: CPT | Performed by: FAMILY MEDICINE

## 2022-03-14 PROCEDURE — 71046 X-RAY EXAM CHEST 2 VIEWS: CPT

## 2022-03-14 RX ORDER — FAMOTIDINE 20 MG/1
20 TABLET, FILM COATED ORAL 2 TIMES DAILY
Qty: 60 TABLET | Refills: 0 | Status: SHIPPED | OUTPATIENT
Start: 2022-03-14

## 2022-03-14 NOTE — TELEPHONE ENCOUNTER
Regarding: headache, body ache and chills-Appointment Request  ----- Message from Jayce Cuellar sent at 3/14/2022  6:05 AM EDT -----  " I have a headache, body ache and chills and I would like to be seen by my PCP "

## 2022-03-14 NOTE — PROGRESS NOTES
FAMILY PRACTICE OFFICE VISIT       NAME: Alysa Starr  AGE: 62 y o  SEX: female       : 1965        MRN: [id number]        Assessment and Plan     1  Chills  Assessment & Plan:  Patient presents for evaluation of chills, fatigue, headache, decreased appetite and weakness since   Patient is afebrile  No URI symptoms  No travel  No known sick contacts  Patient was evaluated emergency room on   CBC revealed elevated white blood cell count  CMP revealed mild elevation of LFTs  Negative testing for COVID and flu  Patient remains on regimen of Tylenol 500 mg 3 times a day as needed  She discontinued ibuprofen since it has caused worsening of epigastric pain  Etiology of patient's symptoms is not entirely clear  I advised her to proceed with blood work, urinalysis and stat chest x-ray  Patient may increase dose of Tylenol from 500 to 1000 mg every 8 hours as needed  Once her abdominal symptoms improve-she may restart ibuprofen 400 mg every 6 to 8 hours as needed  I will be in touch with patient regarding blood work and chest X ray results      Orders:  -     CBC and differential; Future  -     Comprehensive metabolic panel; Future  -     Urine culture  -     Urinalysis with microscopic  -     Mononucleosis screen; Future  -     Lyme Total Antibody Profile with reflex to WB; Future  -     XR chest pa & lateral; Future; Expected date: 2022  -     Sedimentation rate, automated; Future  -     C-reactive protein; Future    2  Leukocytosis, unspecified type  -     CBC and differential; Future  -     Comprehensive metabolic panel; Future  -     Urine culture  -     Urinalysis with microscopic  -     Mononucleosis screen; Future  -     Lyme Total Antibody Profile with reflex to WB; Future  -     XR chest pa & lateral; Future; Expected date: 2022  -     Sedimentation rate, automated; Future  -     C-reactive protein; Future    3   Epigastric pain  Comments:  Patient will start Pepcid 20 mg twice a day  Orders:  -     famotidine (PEPCID) 20 mg tablet; Take 1 tablet (20 mg total) by mouth 2 (two) times a day    4  Elevated liver function tests  Comments:  Mild elevation of ALT, AST and alkaline phosphatase  Normal bilirubin  Negative bedside right upper quadrant ultrasound performed in the ED on March 12th  Orders:  -     Comprehensive metabolic panel; Future           There are no Patient Instructions on file for this visit  Return if symptoms worsen or fail to improve  Discussed with the patient and all questioned fully answered  She will call me if any problems arise  M*Modal software was used to dictate this note  It may contain errors with dictating incorrect words/spelling  Please contact provider directly with any questions  Chief Complaint     Chief Complaint   Patient presents with    COVID-19     Negative testing Saturday    Influenza     Negative testing  Saturday    Chills     Denies N/V/D    Headache    Generalized Body Aches    Poor Appetite    [place] Cologuard     Has box at home to complete       History of Present Illness     Follow up ER visit few days ago  Patient developed generalized body aches,headache and chills while at work on 3/9/22  She has venessa had fever with those symptoms  Used Ibuprofen for HA- which caused GI upset   NO URI s/o , no cough, no congestion, no ST  Developed abdominal pain - epigastric, after start of Ibuprofen  No burping or belching,no nausea or vomiting , normal BMs and urination  Feels VERY thirsty - feels that she is not urinating in proportion to her drinking  Very weak, moved with her parents since she feels so fatigued  Hard time finding comfortable position at night due to headache  Tylenol 500 mg helps with headaches and chills    No recent travel , no known sick contacts  No new Rx - started new bottle of Paxil CR - different generic company    I reviewed ED records, visit on 3/12/22  Mildly elevated LFTs  AST was 55, ALT was 89, alkaline phosphatase 135  Bilirubin level was normal    Lipase was not elevated   White Blood cell count was elevated at 14,000 with left shift  Bedside gallbladder ultrasound was unremarkable  Negative testing for COVID and flu  Headache   Associated symptoms include abdominal pain (Epigastric)  Pertinent negatives include no fever  Generalized Body Aches  Associated symptoms include headaches, fatigue and abdominal pain (Epigastric)  Pertinent negatives include no fever  Review of Systems   Review of Systems   Constitutional: Positive for activity change, appetite change, chills and fatigue  Negative for fever  HENT: Negative  Eyes: Negative  Respiratory: Negative  Cardiovascular: Negative  Gastrointestinal: Positive for abdominal pain (Epigastric)  Endocrine: Negative  Genitourinary: Positive for decreased urine volume  Negative for difficulty urinating  Musculoskeletal: Positive for myalgias  Negative for arthralgias  Allergic/Immunologic: Negative  Neurological: Positive for headaches  Hematological: Negative  Psychiatric/Behavioral: Negative  Active Problem List     Patient Active Problem List   Diagnosis    Biliary dyskinesia    Depression with anxiety    Dyslipidemia    Insomnia    TMJ arthralgia    Chronic right-sided low back pain with right-sided sciatica    Trigger finger, right middle finger    Trigger finger, right ring finger    Tobacco use    Encounter for wellness examination in adult    Chills       Past Medical History:  Past Medical History:   Diagnosis Date    Headache        Past Surgical History:  History reviewed  No pertinent surgical history      Family History:  Family History   Problem Relation Age of Onset    Seizures Sister     Lupus Sister        Social History:  Social History     Socioeconomic History    Marital status: /Civil Union     Spouse name: Not on file    Number of children: Not on file    Years of education: Not on file    Highest education level: Not on file   Occupational History     Employer: [employer]   Tobacco Use    Smoking status: Current Every Day Smoker     Packs/day: 0 50     Years: 25 00     Pack years: 12 50    Smokeless tobacco: Never Used   Vaping Use    Vaping Use: Never used   Substance and Sexual Activity    Alcohol use: Yes     Comment: occasionally    Drug use: No    Sexual activity: Not on file   Other Topics Concern    Not on file   Social History Narrative    Not on file     Social Determinants of Health     Financial Resource Strain: Not on file   Food Insecurity: Not on file   Transportation Needs: Not on file   Physical Activity: Not on file   Stress: Not on file   Social Connections: Not on file   Intimate Partner Violence: Not on file   Housing Stability: Not on file         Objective     Vitals:    03/14/22 1129   BP: 92/60   BP Location: Left arm   Patient Position: Sitting   Cuff Size: Standard   Pulse: 69   Resp: 16   Temp: 97 8 °F (36 6 °C)   TempSrc: Temporal   SpO2: 95%   Weight: 56 2 kg (124 lb)   Height: 5' 2" (1 575 m)       Wt Readings from Last 3 Encounters:   03/14/22 56 2 kg (124 lb)   03/11/22 54 4 kg (120 lb)   10/27/21 55 2 kg (121 lb 9 6 oz)       Physical Exam  Vitals and nursing note reviewed  Constitutional:       General: She is not in acute distress  Appearance: Normal appearance  She is well-developed  She is ill-appearing (fatigued)  HENT:      Head: Normocephalic and atraumatic  Right Ear: Tympanic membrane normal       Left Ear: Tympanic membrane normal       Mouth/Throat:      Pharynx: No oropharyngeal exudate or posterior oropharyngeal erythema  Eyes:      General: No scleral icterus  Conjunctiva/sclera: Conjunctivae normal    Neck:      Thyroid: No thyromegaly  Vascular: No carotid bruit  Cardiovascular:      Rate and Rhythm: Normal rate and regular rhythm        Heart sounds: Normal heart sounds  No murmur heard  Pulmonary:      Effort: Pulmonary effort is normal  No respiratory distress  Breath sounds: Normal breath sounds  No wheezing  Abdominal:      General: Abdomen is flat  Bowel sounds are normal  There is no distension or abdominal bruit  Palpations: Abdomen is soft  Tenderness: There is abdominal tenderness in the epigastric area  There is no right CVA tenderness, left CVA tenderness, guarding or rebound  Musculoskeletal:         General: Normal range of motion  Cervical back: Neck supple  No rigidity  Right lower leg: No edema  Left lower leg: No edema  Skin:     Coloration: Skin is pale  Skin is not jaundiced  Neurological:      Mental Status: She is alert and oriented to person, place, and time  Cranial Nerves: No cranial nerve deficit  Coordination: Coordination normal    Psychiatric:         Mood and Affect: Mood normal          Behavior: Behavior normal          Thought Content:  Thought content normal           Pertinent Laboratory/Diagnostic Studies:    Lab Results   Component Value Date    WBC 16 3 (H) 03/14/2022    HGB 11 9 03/14/2022    HCT 33 8 (L) 03/14/2022    MCV 89 7 03/14/2022     03/14/2022       No results found for: TSH    Lab Results   Component Value Date    CHOL 204 (H) 05/02/2017     Lab Results   Component Value Date    TRIG 117 05/02/2017     Lab Results   Component Value Date    HDL 58 05/02/2017     No results found for: LDLCALC  No results found for: HGBA1C  Lab Results   Component Value Date    SODIUM 134 (L) 03/14/2022    K 4 4 03/14/2022    CL 98 03/14/2022    CO2 28 03/14/2022    AGAP 4 03/12/2022    BUN 10 03/14/2022    CREATININE 0 63 03/14/2022    GLUC 96 03/14/2022    CALCIUM 8 9 03/14/2022    AST 18 03/14/2022    ALT 60 (H) 03/14/2022    ALKPHOS 176 (H) 03/14/2022    PROT 6 3 05/02/2017    TP 6 0 (L) 03/14/2022    BILITOT 0 5 05/02/2017    TBILI 0 6 03/14/2022    EGFR 99 03/12/2022 Orders Placed This Encounter   Procedures    Urine culture    XR chest pa & lateral    CBC and differential    Comprehensive metabolic panel    Urinalysis with microscopic    Mononucleosis screen    Lyme Total Antibody Profile with reflex to WB    Sedimentation rate, automated    C-reactive protein       ALLERGIES:  Allergies   Allergen Reactions    Azithromycin Nausea Only    Iodine - Food Allergy Swelling and Edema     Reaction Date: 14Sep2011;     Zoloft [Sertraline]      Worsening of anxiety       Current Medications     Current Outpatient Medications   Medication Sig Dispense Refill    PARoxetine (PAXIL-CR) 25 mg 24 hr tablet TAKE 1 TABLET BY MOUTH EVERY MORNING 90 tablet 3    famotidine (PEPCID) 20 mg tablet Take 1 tablet (20 mg total) by mouth 2 (two) times a day 60 tablet 0     No current facility-administered medications for this visit         Medications Discontinued During This Encounter   Medication Reason    pantoprazole (PROTONIX) 20 mg tablet        Health Maintenance     Health Maintenance   Topic Date Due    Hepatitis C Screening  Never done    Pneumococcal Vaccine: Pediatrics (0 to 5 Years) and At-Risk Patients (6 to 59 Years) (1 of 2 - PPSV23) Never done    HIV Screening  Never done    DTaP,Tdap,and Td Vaccines (1 - Tdap) Never done    Breast Cancer Screening: Mammogram  Never done    Colorectal Cancer Screening  Never done    COVID-19 Vaccine (1) 06/14/2022 (Originally 3/9/1970)    Influenza Vaccine (1) 06/30/2022 (Originally 9/1/2021)    Annual Physical  08/02/2022    BMI: Adult  03/14/2023    Cervical Cancer Screening  10/11/2024    HIB Vaccine  Aged Out    Hepatitis B Vaccine  Aged Out    IPV Vaccine  Aged Out    Hepatitis A Vaccine  Aged Out    Meningococcal ACWY Vaccine  Aged Out    HPV Vaccine  Aged Out       Immunization History   Administered Date(s) Administered    INFLUENZA 11/01/2018    Influenza, recombinant, quadrivalent,injectable, preservative free 12/10/2019    Influenza, seasonal, injectable, preservative free 11/01/2018       Radha Carvalho MD

## 2022-03-14 NOTE — TELEPHONE ENCOUNTER
Reason for Disposition   [1] MODERATE headache (e g , interferes with normal activities) AND [2] present > 24 hours AND [3] unexplained  (Exceptions: analgesics not tried, typical migraine, or headache part of viral illness)    Answer Assessment - Initial Assessment Questions  1  LOCATION: "Where does it hurt?"       Head  2  ONSET: "When did the headache start?" (Minutes, hours or days)       Wed  3  PATTERN: "Does the pain come and go, or has it been constant since it started?"      Come and goes  4  SEVERITY: "How bad is the pain?" and "What does it keep you from doing?"  (e g , Scale 1-10; mild, moderate, or severe)    - MILD (1-3): doesn't interfere with normal activities     - MODERATE (4-7): interferes with normal activities or awakens from sleep     - SEVERE (8-10): excruciating pain, unable to do any normal activities         8/10  5  RECURRENT SYMPTOM: "Have you ever had headaches before?" If Yes, ask: "When was the last time?" and "What happened that time?"       no  6  CAUSE: "What do you think is causing the headache?"      Not sure  7  MIGRAINE: "Have you been diagnosed with migraine headaches?" If Yes, ask: "Is this headache similar?"       no  8  HEAD INJURY: "Has there been any recent injury to the head?"       no  9   OTHER SYMPTOMS: "Do you have any other symptoms?" (fever, stiff neck, eye pain, sore throat, cold symptoms)      Chills, body aches    Protocols used: HEADACHE-ADULT-

## 2022-03-15 ENCOUNTER — TELEPHONE (OUTPATIENT)
Dept: FAMILY MEDICINE CLINIC | Facility: CLINIC | Age: 57
End: 2022-03-15

## 2022-03-15 DIAGNOSIS — R10.13 EPIGASTRIC PAIN: Primary | ICD-10-CM

## 2022-03-15 DIAGNOSIS — D72.829 LEUKOCYTOSIS, UNSPECIFIED TYPE: ICD-10-CM

## 2022-03-15 DIAGNOSIS — R79.89 ELEVATED LIVER FUNCTION TESTS: ICD-10-CM

## 2022-03-15 NOTE — ASSESSMENT & PLAN NOTE
Patient presents for evaluation of chills, fatigue, headache, decreased appetite and weakness since March 9th  Patient is afebrile  No URI symptoms  No travel  No known sick contacts  Patient was evaluated emergency room on March 12th  CBC revealed elevated white blood cell count  CMP revealed mild elevation of LFTs  Negative testing for COVID and flu  Patient remains on regimen of Tylenol 500 mg 3 times a day as needed  She discontinued ibuprofen since it has caused worsening of epigastric pain  Etiology of patient's symptoms is not entirely clear  I advised her to proceed with blood work, urinalysis and stat chest x-ray  Patient may increase dose of Tylenol from 500 to 1000 mg every 8 hours as needed  Once her abdominal symptoms improve-she may restart ibuprofen 400 mg every 6 to 8 hours as needed   I will be in touch with patient regarding blood work and chest X ray results

## 2022-03-15 NOTE — TELEPHONE ENCOUNTER
Please contact patient  I reviewed results of available testing  · No pneumonia on chest X ray  · No evidence of urinary tract infection  · Blood work reveals persistent elevation of white blood cell count, it is higher than before  · Mild elevation of liver function test persists  · Test for mononucleosis is still pending  · My biggest concern is very high level of inflammation that her blood work indicates  C-reactive protein at 176, normal value should be below 8   · I am concerned about this inflammation, white blood cell count and patient's symptoms of weakness, lack of appetite and abdominal pain    I believe she should proceed back to ER for further evaluation including CT abdomen and pelvis, IV fluids etc   · If patient is agreeable-will place ADT 21 orders to the campus of her choice and I will personally contact ER regarding her care    Thank you

## 2022-03-15 NOTE — TELEPHONE ENCOUNTER
Spoke with pt, MD instructions given  Pt very upset  Instructed to proceed to ED  Prefers [facility]  ADT21 placed

## 2022-03-16 ENCOUNTER — HOSPITAL ENCOUNTER (EMERGENCY)
Facility: HOSPITAL | Age: 57
Discharge: HOME | End: 2022-03-16
Attending: EMERGENCY MEDICINE
Payer: COMMERCIAL

## 2022-03-16 ENCOUNTER — APPOINTMENT (EMERGENCY)
Dept: RADIOLOGY | Facility: HOSPITAL | Age: 57
End: 2022-03-16
Payer: COMMERCIAL

## 2022-03-16 ENCOUNTER — TELEPHONE (OUTPATIENT)
Dept: FAMILY MEDICINE CLINIC | Facility: CLINIC | Age: 57
End: 2022-03-16

## 2022-03-16 VITALS
WEIGHT: 120 LBS | BODY MASS INDEX: 21.95 KG/M2 | DIASTOLIC BLOOD PRESSURE: 62 MMHG | SYSTOLIC BLOOD PRESSURE: 124 MMHG | RESPIRATION RATE: 16 BRPM | OXYGEN SATURATION: 100 % | TEMPERATURE: 97.6 F | HEART RATE: 69 BPM

## 2022-03-16 DIAGNOSIS — R10.13 EPIGASTRIC ABDOMINAL PAIN: Primary | ICD-10-CM

## 2022-03-16 DIAGNOSIS — D18.03 LIVER HEMANGIOMA: ICD-10-CM

## 2022-03-16 DIAGNOSIS — N12 PYELONEPHRITIS: ICD-10-CM

## 2022-03-16 LAB
ALBUMIN SERPL BCP-MCNC: 3 G/DL (ref 3.5–5)
ALBUMIN SERPL-MCNC: 3.6 G/DL (ref 3.6–5.1)
ALBUMIN/GLOB SERPL: 1.5 (CALC) (ref 1–2.5)
ALP SERPL-CCNC: 170 U/L (ref 46–116)
ALP SERPL-CCNC: 176 U/L (ref 37–153)
ALT SERPL W P-5'-P-CCNC: 49 U/L (ref 12–78)
ALT SERPL-CCNC: 60 U/L (ref 6–29)
ANION GAP SERPL CALCULATED.3IONS-SCNC: 5 MMOL/L (ref 4–13)
APPEARANCE UR: CLEAR
ARTIFACT: PRESENT
AST SERPL W P-5'-P-CCNC: 19 U/L (ref 5–45)
AST SERPL-CCNC: 18 U/L (ref 10–35)
B BURGDOR AB SER IA-ACNC: <0.9 INDEX
BACTERIA UR QL AUTO: ABNORMAL /HPF
BACTERIA UR QL AUTO: ABNORMAL /HPF
BASOPHILS # BLD AUTO: 65 CELLS/UL (ref 0–200)
BASOPHILS # BLD MANUAL: 0.12 THOUSAND/UL (ref 0–0.1)
BASOPHILS NFR BLD AUTO: 0.4 %
BASOPHILS NFR MAR MANUAL: 1 % (ref 0–1)
BILIRUB SERPL-MCNC: 0.44 MG/DL (ref 0.2–1)
BILIRUB SERPL-MCNC: 0.6 MG/DL (ref 0.2–1.2)
BILIRUB UR QL STRIP: NEGATIVE
BILIRUB UR QL STRIP: NEGATIVE
BUN SERPL-MCNC: 10 MG/DL (ref 7–25)
BUN SERPL-MCNC: 12 MG/DL (ref 5–25)
BUN/CREAT SERPL: ABNORMAL (CALC) (ref 6–22)
CALCIUM ALBUM COR SERPL-MCNC: 9.9 MG/DL (ref 8.3–10.1)
CALCIUM SERPL-MCNC: 8.9 MG/DL (ref 8.6–10.4)
CALCIUM SERPL-MCNC: 9.1 MG/DL (ref 8.3–10.1)
CHLORIDE SERPL-SCNC: 103 MMOL/L (ref 100–108)
CHLORIDE SERPL-SCNC: 98 MMOL/L (ref 98–110)
CLARITY UR: CLEAR
CO2 SERPL-SCNC: 28 MMOL/L (ref 20–32)
CO2 SERPL-SCNC: 28 MMOL/L (ref 21–32)
COLOR UR: YELLOW
COLOR UR: YELLOW
CREAT SERPL-MCNC: 0.63 MG/DL (ref 0.5–1.05)
CREAT SERPL-MCNC: 0.68 MG/DL (ref 0.6–1.3)
CRP SERPL-MCNC: 176.4 MG/L
EOSINOPHIL # BLD AUTO: 33 CELLS/UL (ref 15–500)
EOSINOPHIL # BLD MANUAL: 0.24 THOUSAND/UL (ref 0–0.4)
EOSINOPHIL NFR BLD AUTO: 0.2 %
EOSINOPHIL NFR BLD MANUAL: 2 % (ref 0–6)
ERYTHROCYTE [DISTWIDTH] IN BLOOD BY AUTOMATED COUNT: 12.2 % (ref 11–15)
ERYTHROCYTE [DISTWIDTH] IN BLOOD BY AUTOMATED COUNT: 13.7 % (ref 11.6–15.1)
ERYTHROCYTE [SEDIMENTATION RATE] IN BLOOD BY WESTERGREN METHOD: 79 MM/H
GFR SERPL CREATININE-BSD FRML MDRD: 97 ML/MIN/1.73SQ M
GLOBULIN SER CALC-MCNC: 2.4 G/DL (CALC) (ref 1.9–3.7)
GLUCOSE SERPL-MCNC: 106 MG/DL (ref 65–140)
GLUCOSE SERPL-MCNC: 96 MG/DL (ref 65–99)
GLUCOSE UR QL STRIP: NEGATIVE
GLUCOSE UR STRIP-MCNC: NEGATIVE MG/DL
HCT VFR BLD AUTO: 33.8 % (ref 35–45)
HCT VFR BLD AUTO: 35.8 % (ref 34.8–46.1)
HETEROPH AB SER QL LA: NEGATIVE
HGB BLD-MCNC: 11.6 G/DL (ref 11.5–15.4)
HGB BLD-MCNC: 11.9 G/DL (ref 11.7–15.5)
HGB UR QL STRIP.AUTO: ABNORMAL
HGB UR QL STRIP: ABNORMAL
HYALINE CASTS #/AREA URNS LPF: ABNORMAL /LPF
KETONES UR QL STRIP: NEGATIVE
KETONES UR STRIP-MCNC: NEGATIVE MG/DL
LEUKOCYTE ESTERASE UR QL STRIP: ABNORMAL
LEUKOCYTE ESTERASE UR QL STRIP: ABNORMAL
LIPASE SERPL-CCNC: 61 U/L (ref 73–393)
LYMPHOCYTES # BLD AUTO: 1842 CELLS/UL (ref 850–3900)
LYMPHOCYTES # BLD AUTO: 2.77 THOUSAND/UL (ref 0.6–4.47)
LYMPHOCYTES # BLD AUTO: 23 % (ref 14–44)
LYMPHOCYTES NFR BLD AUTO: 11.3 %
MCH RBC QN AUTO: 30.7 PG (ref 26.8–34.3)
MCH RBC QN AUTO: 31.6 PG (ref 27–33)
MCHC RBC AUTO-ENTMCNC: 32.4 G/DL (ref 31.4–37.4)
MCHC RBC AUTO-ENTMCNC: 35.2 G/DL (ref 32–36)
MCV RBC AUTO: 89.7 FL (ref 80–100)
MCV RBC AUTO: 95 FL (ref 82–98)
MONOCYTES # BLD AUTO: 0.6 THOUSAND/UL (ref 0–1.22)
MONOCYTES # BLD AUTO: 1858 CELLS/UL (ref 200–950)
MONOCYTES NFR BLD AUTO: 11.4 %
MONOCYTES NFR BLD: 5 % (ref 4–12)
NEUTROPHILS # BLD AUTO: ABNORMAL CELLS/UL (ref 1500–7800)
NEUTROPHILS # BLD MANUAL: 7.84 THOUSAND/UL (ref 1.85–7.62)
NEUTROPHILS NFR BLD AUTO: 76.7 %
NEUTS BAND NFR BLD MANUAL: 3 % (ref 0–8)
NEUTS SEG NFR BLD AUTO: 62 % (ref 43–75)
NITRITE UR QL STRIP: NEGATIVE
NITRITE UR QL STRIP: NEGATIVE
NON-SQ EPI CELLS URNS QL MICRO: ABNORMAL /HPF
PH UR STRIP.AUTO: 6 [PH] (ref 4.5–8)
PH UR STRIP: 7 [PH] (ref 5–8)
PLATELET # BLD AUTO: 287 THOUSAND/UL (ref 140–400)
PLATELET # BLD AUTO: 381 THOUSANDS/UL (ref 149–390)
PLATELET BLD QL SMEAR: ADEQUATE
PMV BLD AUTO: 8.9 FL (ref 8.9–12.7)
PMV BLD REES-ECKER: 10.3 FL (ref 7.5–12.5)
POTASSIUM SERPL-SCNC: 4.1 MMOL/L (ref 3.5–5.3)
POTASSIUM SERPL-SCNC: 4.4 MMOL/L (ref 3.5–5.3)
PROT SERPL-MCNC: 6 G/DL (ref 6.1–8.1)
PROT SERPL-MCNC: 7.2 G/DL (ref 6.4–8.2)
PROT UR QL STRIP: NEGATIVE
PROT UR STRIP-MCNC: NEGATIVE MG/DL
RBC # BLD AUTO: 3.77 MILLION/UL (ref 3.8–5.1)
RBC # BLD AUTO: 3.78 MILLION/UL (ref 3.81–5.12)
RBC #/AREA URNS AUTO: ABNORMAL /HPF
RBC #/AREA URNS HPF: ABNORMAL /HPF
SL AMB EGFR AFRICAN AMERICAN: 115 ML/MIN/1.73M2
SL AMB EGFR NON AFRICAN AMERICAN: 100 ML/MIN/1.73M2
SODIUM SERPL-SCNC: 134 MMOL/L (ref 135–146)
SODIUM SERPL-SCNC: 136 MMOL/L (ref 136–145)
SP GR UR STRIP.AUTO: 1.01 (ref 1–1.03)
SP GR UR STRIP: 1 (ref 1–1.03)
SQUAMOUS #/AREA URNS HPF: ABNORMAL /HPF
TRANS CELLS #/AREA URNS HPF: PRESENT /[HPF]
UROBILINOGEN UR QL STRIP.AUTO: 0.2 E.U./DL
VARIANT LYMPHS # BLD AUTO: 4 %
WBC # BLD AUTO: 12.06 THOUSAND/UL (ref 4.31–10.16)
WBC # BLD AUTO: 16.3 THOUSAND/UL (ref 3.8–10.8)
WBC #/AREA URNS AUTO: ABNORMAL /HPF
WBC #/AREA URNS HPF: ABNORMAL /HPF

## 2022-03-16 PROCEDURE — 80053 COMPREHEN METABOLIC PANEL: CPT | Performed by: EMERGENCY MEDICINE

## 2022-03-16 PROCEDURE — 87077 CULTURE AEROBIC IDENTIFY: CPT

## 2022-03-16 PROCEDURE — 87186 SC STD MICRODIL/AGAR DIL: CPT

## 2022-03-16 PROCEDURE — 85027 COMPLETE CBC AUTOMATED: CPT | Performed by: EMERGENCY MEDICINE

## 2022-03-16 PROCEDURE — 74177 CT ABD & PELVIS W/CONTRAST: CPT

## 2022-03-16 PROCEDURE — 87086 URINE CULTURE/COLONY COUNT: CPT

## 2022-03-16 PROCEDURE — 81001 URINALYSIS AUTO W/SCOPE: CPT

## 2022-03-16 PROCEDURE — 99284 EMERGENCY DEPT VISIT MOD MDM: CPT | Performed by: EMERGENCY MEDICINE

## 2022-03-16 PROCEDURE — 36415 COLL VENOUS BLD VENIPUNCTURE: CPT | Performed by: EMERGENCY MEDICINE

## 2022-03-16 PROCEDURE — G1004 CDSM NDSC: HCPCS

## 2022-03-16 PROCEDURE — 83690 ASSAY OF LIPASE: CPT | Performed by: EMERGENCY MEDICINE

## 2022-03-16 PROCEDURE — 85007 BL SMEAR W/DIFF WBC COUNT: CPT | Performed by: EMERGENCY MEDICINE

## 2022-03-16 PROCEDURE — 99284 EMERGENCY DEPT VISIT MOD MDM: CPT

## 2022-03-16 RX ORDER — CEPHALEXIN 500 MG/1
500 CAPSULE ORAL EVERY 12 HOURS SCHEDULED
Qty: 28 CAPSULE | Refills: 0 | Status: SHIPPED | OUTPATIENT
Start: 2022-03-16 | End: 2022-03-30

## 2022-03-16 RX ADMIN — IOHEXOL 100 ML: 350 INJECTION, SOLUTION INTRAVENOUS at 10:59

## 2022-03-16 NOTE — TELEPHONE ENCOUNTER
Spoke with patient  Made aware of results and provider's instructions  Patient verbalized understanding and will  Rx from [address] [place] [address]  Pt asked that I please relate her concern to MD   She states that she does not believe her Sx are or were from the kidney infection  She thinks if that were the case, she should be feeling worst and not better like she is now  She is questioning if there could be or there was something else causing her sx? She would like your opinion      Pt is scheduled for Mon, 3/21st at 2:30 pm

## 2022-03-16 NOTE — TELEPHONE ENCOUNTER
----- Message from Jared Razo sent at 3/16/2022  2:37 PM EDT -----  Regarding: FW: Tony Kraus    ----- Message -----  From: Alysa Starr  Sent: 3/16/2022   1:46 PM EDT  To: [facility]  Subject: Cristobal Busing at ER for catscan now    Had me do Urine test   Am not waiting for results,  please advise when u get results

## 2022-03-16 NOTE — TELEPHONE ENCOUNTER
Please contact patient  I received this my chart message  I presume she sent while she was in  emergency room? She was evaluated in the ER early today due to ongoing symptoms of weakness, chills and high white blood cell count and CRP  I did review results of CT scan  It reveals possible pyelonephritis ( kidney infection)  and emergency room doctor started her on Keflex (antibiotic)  Please make sure that she has picked up Rx and started taking it as directed  There is also incidental finding of liver lesion that will require further evaluation  It does not look bad or suspicious but radiologist strongly recommends to proceed with additional testing to confirm it  Patient should schedule follow-up with me early next week      Thank you

## 2022-03-16 NOTE — ED NOTES
Pt out to nurses station stating "its been 1 5 hours since my last test, im ready to leave  This is crazy people could use this bed  What happens if I just walk out" ED resident and attending aware  Will be over to talk to pt        Jesse Zavala, KEON  03/16/22 9517

## 2022-03-16 NOTE — ED ATTENDING ATTESTATION
3/16/2022  Dejan OLSEN Mt, MD, saw and evaluated the patient  I have discussed the patient with the resident/non-physician practitioner and agree with the resident's/non-physician practitioner's findings, Plan of Care, and MDM as documented in the resident's/non-physician practitioner's note, except where noted  All available labs and Radiology studies were reviewed  I was present for key portions of any procedure(s) performed by the resident/non-physician practitioner and I was immediately available to provide assistance  At this point I agree with the current assessment done in the Emergency Department  I have conducted an independent evaluation of this patient a history and physical is as follows:    ED Course     77-year-old female, no significant past medical history, presenting to the emergency department for evaluation of chills, generalized body aches, myalgias, and generalized weakness which have been ongoing for the past week  Patient has subsequently developed some epigastric abdominal discomfort  No vomiting, diarrhea, documented fever, chest pain, cough, shortness of breath  No dysuria, urinary frequency, or urgency  Ten systems reviewed and negative except as noted  On examination patient is resting comfortably in the stretcher  Head is normocephalic and atraumatic  Eyelids lashes are normal   Mucous membranes are slightly dry  Neck is supple without meningismus  Lungs are clear to auscultation bilaterally with no wheezes rales or rhonchi  Heart is regular rate rhythm with no murmurs rubs or gallops  Abdomen is nondistended, soft, tender in epigastrium  No rebound or guarding  Extremities are unremarkable      Labs Reviewed   CBC AND DIFFERENTIAL - Abnormal       Result Value Ref Range Status    WBC 12 06 (*) 4 31 - 10 16 Thousand/uL Final    RBC 3 78 (*) 3 81 - 5 12 Million/uL Final    Hemoglobin 11 6  11 5 - 15 4 g/dL Final    Hematocrit 35 8  34 8 - 46 1 % Final    MCV 95  82 - 98 fL Final    MCH 30 7  26 8 - 34 3 pg Final    MCHC 32 4  31 4 - 37 4 g/dL Final    RDW 13 7  11 6 - 15 1 % Final    MPV 8 9  8 9 - 12 7 fL Final    Platelets 147  804 - 390 Thousands/uL Final    Narrative: This is an appended report  These results have been appended to a previously verified report  COMPREHENSIVE METABOLIC PANEL - Abnormal    Sodium 136  136 - 145 mmol/L Final    Potassium 4 1  3 5 - 5 3 mmol/L Final    Chloride 103  100 - 108 mmol/L Final    CO2 28  21 - 32 mmol/L Final    ANION GAP 5  4 - 13 mmol/L Final    BUN 12  5 - 25 mg/dL Final    Creatinine 0 68  0 60 - 1 30 mg/dL Final    Comment: Standardized to IDMS reference method    Glucose 106  65 - 140 mg/dL Final    Comment: If the patient is fasting, the ADA then defines impaired fasting glucose as > 100 mg/dL and diabetes as > or equal to 123 mg/dL  Specimen collection should occur prior to Sulfasalazine administration due to the potential for falsely depressed results  Specimen collection should occur prior to Sulfapyridine administration due to the potential for falsely elevated results  Calcium 9 1  8 3 - 10 1 mg/dL Final    Corrected Calcium 9 9  8 3 - 10 1 mg/dL Final    AST 19  5 - 45 U/L Final    Comment: Specimen collection should occur prior to Sulfasalazine administration due to the potential for falsely depressed results  ALT 49  12 - 78 U/L Final    Comment: Specimen collection should occur prior to Sulfasalazine and/or Sulfapyridine administration due to the potential for falsely depressed results  Alkaline Phosphatase 170 (*) 46 - 116 U/L Final    Total Protein 7 2  6 4 - 8 2 g/dL Final    Albumin 3 0 (*) 3 5 - 5 0 g/dL Final    Total Bilirubin 0 44  0 20 - 1 00 mg/dL Final    Comment: Use of this assay is not recommended for patients undergoing treatment with eltrombopag due to the potential for falsely elevated results      eGFR 97  ml/min/1 73sq m Final    Narrative:     [employer] guidelines for Chronic Kidney Disease (CKD):     Stage 1 with normal or high GFR (GFR > 90 mL/min/1 73 square meters)    Stage 2 Mild CKD (GFR = 60-89 mL/min/1 73 square meters)    Stage 3A Moderate CKD (GFR = 45-59 mL/min/1 73 square meters)    Stage 3B Moderate CKD (GFR = 30-44 mL/min/1 73 square meters)    Stage 4 Severe CKD (GFR = 15-29 mL/min/1 73 square meters)    Stage 5 End Stage CKD (GFR <15 mL/min/1 73 square meters)  Note: GFR calculation is accurate only with a steady state creatinine   LIPASE - Abnormal    Lipase 61 (*) 73 - 393 u/L Final   URINE MICROSCOPIC - Abnormal    RBC, UA 1-2  None Seen, 1-2 /hpf Final    WBC, UA 10-20 (*) None Seen, 1-2 /hpf Final    Epithelial Cells Occasional  None Seen, Occasional /hpf Final    Bacteria, UA Occasional  None Seen, Occasional /hpf Final    Transitional Epithelial Cells PRESENT   Final   URINE MACROSCOPIC, POC - Abnormal    Color, UA Yellow   Final    Clarity, UA Clear   Final    pH, UA 6 0  4 5 - 8 0 Final    Leukocytes, UA Trace (*) Negative Final    Nitrite, UA Negative  Negative Final    Protein, UA Negative  Negative mg/dl Final    Glucose, UA Negative  Negative mg/dl Final    Ketones, UA Negative  Negative mg/dl Final    Urobilinogen, UA 0 2  0 2, 1 0 E U /dl E U /dl Final    Bilirubin, UA Negative  Negative Final    Blood, UA Small (*) Negative Final    Specific [place], UA 1 015  1 003 - 1 030 Final    Narrative:     CLINITEK RESULT   MANUAL DIFFERENTIAL(PHLEBS DO NOT ORDER) - Abnormal    Segmented % 62  43 - 75 % Final    Bands % 3  0 - 8 % Final    Lymphocytes % 23  14 - 44 % Final    Monocytes % 5  4 - 12 % Final    Eosinophils, % 2  0 - 6 % Final    Basophils % 1  0 - 1 % Final    Atypical Lymphocytes % 4 (*) <=0 % Final    Absolute Neutrophils 7 84 (*) 1 85 - 7 62 Thousand/uL Final    Lymphocytes Absolute 2 77  0 60 - 4 47 Thousand/uL Final    Monocytes Absolute 0 60  0 00 - 1 22 Thousand/uL Final    Eosinophils Absolute 0 24  0 00 - 0 40 Thousand/uL Final    Basophils Absolute 0 12 (*) 0 00 - 0 10 Thousand/uL Final    Total Counted     Final    Platelet Estimate Adequate  Adequate Final    Artifact Present   Final       CT abdomen pelvis with contrast   Final Result      Patchy areas of striated relative hypoenhancement in both kidneys in a pattern most suspicious for bilateral pyelonephritis  No urinary tract calculus or hydronephrosis  No renal or perirenal abscess identified  A 15 mm lesion in the posterior right hepatic lobe demonstrates CT features that are most suspicious for benign hemangioma but unfortunately indeterminant and, because this lesion is not present on previous examinations, more definitive characterization    with nonemergent follow-up contrast-enhanced hepatic MRI, after the resolution of this patient's acute illness, is recommended  This examination was marked "immediate notification" in Epic in order to begin the standard process by which the radiology reading room liaison alerts the referring practitioner                    Workstation performed: [id number]                 Critical Care Time  Procedures

## 2022-03-16 NOTE — DISCHARGE INSTRUCTIONS
Follow up with your primary care physician regarding further testing for your CT findings of a likely benign hemangioma in your liver  Please return to the emergency department if you develop worsening symptoms, vomiting, fever, or anything else concerning to you

## 2022-03-16 NOTE — ED PROVIDER NOTES
History  Chief Complaint   Patient presents with    Epigastric Pain     fever/chills/body ache - sent from PCP for eval  Ongoing since 19     62year-old female with no past medical history who presents for evaluation of chills and body aches  Patient reports that her symptoms have been ongoing for 1 week  She has had generalized myalgias, fatigue, chills, and generalized weakness  After the onset of the symptoms, she started using ibuprofen for her myalgias  She subsequently developed epigastric pain that has been persistent  Her abdominal pain has not been progressively worsening, but has not improved at all  It is constant and does not radiate  She has not had any nausea or vomiting, diarrhea, fevers, chest pain, shortness of breath  She was evaluated in the emergency department for her symptoms and was found to have a mild leukocytosis and mild transaminitis but otherwise unremarkable workup  She was subsequently evaluated at her primary care physician's office and had outpatient lab work completed that showed slight worsening of her leukocytosis  Lyme and mono testing were negative  Given her persistent symptoms, her primary care physician suggested that she return to the emergency department for further evaluation  Prior to Admission Medications   Prescriptions Last Dose Informant Patient Reported? Taking? PARoxetine (PAXIL-CR) 25 mg 24 hr tablet   No No   Sig: TAKE 1 TABLET BY MOUTH EVERY MORNING   famotidine (PEPCID) 20 mg tablet   No No   Sig: Take 1 tablet (20 mg total) by mouth 2 (two) times a day      Facility-Administered Medications: None       Past Medical History:   Diagnosis Date    Headache        History reviewed  No pertinent surgical history  Family History   Problem Relation Age of Onset    Seizures Sister     Lupus Sister      I have reviewed and agree with the history as documented      E-Cigarette/Vaping    E-Cigarette Use Never User      E-Cigarette/Vaping Substances    Nicotine No     THC No     CBD No     Flavoring No     Other No     Unknown No      Social History     Tobacco Use    Smoking status: Current Every Day Smoker     Packs/day: 0 50     Years: 25 00     Pack years: 12 50    Smokeless tobacco: Never Used   Vaping Use    Vaping Use: Never used   Substance Use Topics    Alcohol use: Yes     Comment: occasionally    Drug use: No        Review of Systems   Constitutional: Positive for chills and fatigue  Negative for fever  Eyes: Negative for visual disturbance  Respiratory: Negative for cough, chest tightness and shortness of breath  Cardiovascular: Negative for chest pain  Gastrointestinal: Positive for abdominal pain  Negative for abdominal distention, constipation, diarrhea, nausea and vomiting  Genitourinary: Negative for dysuria, flank pain, frequency and urgency  Musculoskeletal: Positive for myalgias  Negative for back pain and gait problem  Skin: Negative for pallor and rash  Neurological: Positive for weakness  Negative for syncope, light-headedness and headaches  All other systems reviewed and are negative  Physical Exam  ED Triage Vitals [03/16/22 0908]   Temperature Pulse Respirations Blood Pressure SpO2   97 6 °F (36 4 °C) 69 16 124/62 100 %      Temp Source Heart Rate Source Patient Position - Orthostatic VS BP Location FiO2 (%)   Oral Monitor Lying Right arm --      Pain Score       5             Orthostatic Vital Signs  Vitals:    03/16/22 0908   BP: 124/62   Pulse: 69   Patient Position - Orthostatic VS: Lying       Physical Exam  Vitals and nursing note reviewed  Constitutional:       General: She is not in acute distress  Appearance: She is well-developed  She is not ill-appearing  HENT:      Head: Normocephalic and atraumatic  Nose: Nose normal       Mouth/Throat:      Mouth: Mucous membranes are moist    Eyes:      Extraocular Movements: Extraocular movements intact        Pupils: Pupils are equal, round, and reactive to light  Cardiovascular:      Rate and Rhythm: Normal rate and regular rhythm  Heart sounds: No murmur heard  No friction rub  No gallop  Pulmonary:      Effort: Pulmonary effort is normal       Breath sounds: Normal breath sounds  No wheezing, rhonchi or rales  Abdominal:      General: There is no distension  Palpations: Abdomen is soft  Tenderness: There is abdominal tenderness in the epigastric area  There is no guarding or rebound  Musculoskeletal:         General: No swelling or tenderness  Normal range of motion  Cervical back: Normal range of motion and neck supple  Skin:     General: Skin is warm and dry  Coloration: Skin is not pale  Findings: No rash  Neurological:      General: No focal deficit present  Mental Status: She is alert and oriented to person, place, and time  Psychiatric:         Behavior: Behavior normal          ED Medications  Medications   iohexol (OMNIPAQUE) 350 MG/ML injection (SINGLE-DOSE) 100 mL (100 mL Intravenous Given 3/16/22 1059)       Diagnostic Studies  Results Reviewed     Procedure Component Value Units Date/Time    Urine Microscopic [[id number]]  (Abnormal) Collected: 03/16/22 1212    Lab Status: Final result Specimen: Urine, Clean Catch Updated: 03/16/22 1344     RBC, UA 1-2 /hpf      WBC, UA 10-20 /hpf      Epithelial Cells Occasional /hpf      Bacteria, UA Occasional /hpf      Transitional Epithelial Cells PRESENT    Urine culture [[id number]] Collected: 03/16/22 1212    Lab Status:  In process Specimen: Urine, Clean Catch Updated: 03/16/22 1344    Urine Macroscopic, POC [[id number]]  (Abnormal) Collected: 03/16/22 1212    Lab Status: Final result Specimen: Urine Updated: 03/16/22 1214     Color, UA Yellow     Clarity, UA Clear     pH, UA 6 0     Leukocytes, UA Trace     Nitrite, UA Negative     Protein, UA Negative mg/dl      Glucose, UA Negative mg/dl      Ketones, UA Negative mg/dl Urobilinogen, UA 0 2 E U /dl      Bilirubin, UA Negative     Blood, UA Small     Specific [place], UA 1 015    Narrative:      CLINITEK RESULT    CBC and differential [[id number]]  (Abnormal) Collected: 03/16/22 0922    Lab Status: Final result Specimen: Blood from Arm, Right Updated: 03/16/22 1044     WBC 12 06 Thousand/uL      RBC 3 78 Million/uL      Hemoglobin 11 6 g/dL      Hematocrit 35 8 %      MCV 95 fL      MCH 30 7 pg      MCHC 32 4 g/dL      RDW 13 7 %      MPV 8 9 fL      Platelets 363 Thousands/uL     Narrative: This is an appended report  These results have been appended to a previously verified report      Manual Differential(PHLEBS Do Not Order) [[id number]]  (Abnormal) Collected: 03/16/22 0922    Lab Status: Final result Specimen: Blood from Arm, Right Updated: 03/16/22 1044     Segmented % 62 %      Bands % 3 %      Lymphocytes % 23 %      Monocytes % 5 %      Eosinophils, % 2 %      Basophils % 1 %      Atypical Lymphocytes % 4 %      Absolute Neutrophils 7 84 Thousand/uL      Lymphocytes Absolute 2 77 Thousand/uL      Monocytes Absolute 0 60 Thousand/uL      Eosinophils Absolute 0 24 Thousand/uL      Basophils Absolute 0 12 Thousand/uL      Total Counted --     Platelet Estimate Adequate     Artifact Present    Comprehensive metabolic panel [[id number]]  (Abnormal) Collected: 03/16/22 0922    Lab Status: Final result Specimen: Blood from Arm, Right Updated: 03/16/22 0957     Sodium 136 mmol/L      Potassium 4 1 mmol/L      Chloride 103 mmol/L      CO2 28 mmol/L      ANION GAP 5 mmol/L      BUN 12 mg/dL      Creatinine 0 68 mg/dL      Glucose 106 mg/dL      Calcium 9 1 mg/dL      Corrected Calcium 9 9 mg/dL      AST 19 U/L      ALT 49 U/L      Alkaline Phosphatase 170 U/L      Total Protein 7 2 g/dL      Albumin 3 0 g/dL      Total Bilirubin 0 44 mg/dL      eGFR 97 ml/min/1 73sq m     Narrative:      [place] guidelines for Chronic Kidney Disease (CKD):     Stage 1 with normal or high GFR (GFR > 90 mL/min/1 73 square meters)    Stage 2 Mild CKD (GFR = 60-89 mL/min/1 73 square meters)    Stage 3A Moderate CKD (GFR = 45-59 mL/min/1 73 square meters)    Stage 3B Moderate CKD (GFR = 30-44 mL/min/1 73 square meters)    Stage 4 Severe CKD (GFR = 15-29 mL/min/1 73 square meters)    Stage 5 End Stage CKD (GFR <15 mL/min/1 73 square meters)  Note: GFR calculation is accurate only with a steady state creatinine    Lipase [[id number]]  (Abnormal) Collected: 03/16/22 0922    Lab Status: Final result Specimen: Blood from Arm, Right Updated: 03/16/22 0957     Lipase 61 u/L                  CT abdomen pelvis with contrast   Final Result by Dorcas Victor MD (03/16 1109)      Patchy areas of striated relative hypoenhancement in both kidneys in a pattern most suspicious for bilateral pyelonephritis  No urinary tract calculus or hydronephrosis  No renal or perirenal abscess identified  A 15 mm lesion in the posterior right hepatic lobe demonstrates CT features that are most suspicious for benign hemangioma but unfortunately indeterminant and, because this lesion is not present on previous examinations, more definitive characterization    with nonemergent follow-up contrast-enhanced hepatic MRI, after the resolution of this patient's acute illness, is recommended  This examination was marked "immediate notification" in Epic in order to begin the standard process by which the radiology reading room liaison alerts the referring practitioner  Workstation performed: [id number]               Procedures  Procedures      ED Course                                       MDM  Number of Diagnoses or Management Options  Epigastric abdominal pain: new and requires workup  Liver hemangioma: new and requires workup  Pyelonephritis: new and requires workup  Diagnosis management comments: 71-year-old female who presents for evaluation of abdominal pain, chills, body aches  Will obtain abdominal labs, CT abdomen pelvis  Labs unremarkable  CT abdomen pelvis showing bilateral pyelonephritis  UA consistent with UTI  Patient initiated on Keflex  Advised follow-up with primary care physician  Return precautions discussed  Amount and/or Complexity of Data Reviewed  Clinical lab tests: ordered and reviewed  Tests in the radiology section of CPT®: reviewed and ordered  Review and summarize past medical records: yes    Risk of Complications, Morbidity, and/or Mortality  Presenting problems: high  Diagnostic procedures: low  Management options: minimal    Patient Progress  Patient progress: stable      Disposition  Final diagnoses:   Epigastric abdominal pain   Liver hemangioma   Pyelonephritis     Time reflects when diagnosis was documented in both MDM as applicable and the Disposition within this note     Time User Action Codes Description Comment    3/16/2022  1:42 PM Ana Mert Add [R10 13] Epigastric abdominal pain     3/16/2022  1:42 PM Ana Mert Add [D18 03] Liver hemangioma     3/16/2022  1:45 PM Ana Mert Add [N12] Pyelonephritis       ED Disposition     ED Disposition Condition Date/Time Comment    Discharge Stable Wed Mar 16, 2022  1:42 PM Dev Silva discharge to home/self care              Follow-up Information     Follow up With Specialties Details Why Contact Info    Khanh Capps MD Family Medicine In 1 day  [address]  [phone]            Discharge Medication List as of 3/16/2022  1:47 PM      START taking these medications    Details   cephalexin (KEFLEX) 500 mg capsule Take 1 capsule (500 mg total) by mouth every 12 (twelve) hours for 14 days, Starting Wed 3/16/2022, Until Wed 3/30/2022, Normal         CONTINUE these medications which have NOT CHANGED    Details   famotidine (PEPCID) 20 mg tablet Take 1 tablet (20 mg total) by mouth 2 (two) times a day, Starting Mon 3/14/2022, Normal      PARoxetine (PAXIL-CR) 25 mg 24 hr tablet TAKE 1 TABLET BY MOUTH EVERY MORNING, Normal           No discharge procedures on file  PDMP Review       Value Time User    PDMP Reviewed  Yes 7/14/2021  7:14 PM Sonia Mota, [facility]           ED Provider  Attending physically available and evaluated Jose Meza  I managed the patient along with the ED Attending      Electronically Signed by         Brenda Colón MD  03/16/22 3660

## 2022-03-17 NOTE — TELEPHONE ENCOUNTER
Please contact patient  I was bit surprised myself with results of CT scan  As I mentioned in my previous messages, her original urine culture did not indicate any UTI  She did have some leukocytes while in emergency room  Urine culture from yesterday is still pending  Please ask her to continue on Keflex for now  Please let her know that I will call her once I received results of urine culture from emergency room      Thank you

## 2022-03-18 LAB
BACTERIA UR CULT: ABNORMAL
BACTERIA UR CULT: ABNORMAL

## 2022-03-21 ENCOUNTER — OFFICE VISIT (OUTPATIENT)
Dept: FAMILY MEDICINE CLINIC | Facility: CLINIC | Age: 57
End: 2022-03-21
Payer: COMMERCIAL

## 2022-03-21 VITALS
HEIGHT: 62 IN | RESPIRATION RATE: 16 BRPM | WEIGHT: 120 LBS | SYSTOLIC BLOOD PRESSURE: 100 MMHG | DIASTOLIC BLOOD PRESSURE: 58 MMHG | HEART RATE: 65 BPM | OXYGEN SATURATION: 97 % | BODY MASS INDEX: 22.08 KG/M2 | TEMPERATURE: 98 F

## 2022-03-21 DIAGNOSIS — K76.9 HEPATIC LESION: ICD-10-CM

## 2022-03-21 DIAGNOSIS — N12 PYELONEPHRITIS: Primary | ICD-10-CM

## 2022-03-21 DIAGNOSIS — E55.9 VITAMIN D DEFICIENCY: ICD-10-CM

## 2022-03-21 DIAGNOSIS — E78.5 DYSLIPIDEMIA: ICD-10-CM

## 2022-03-21 PROCEDURE — 3008F BODY MASS INDEX DOCD: CPT | Performed by: FAMILY MEDICINE

## 2022-03-21 PROCEDURE — 99214 OFFICE O/P EST MOD 30 MIN: CPT | Performed by: FAMILY MEDICINE

## 2022-03-21 PROCEDURE — 4004F PT TOBACCO SCREEN RCVD TLK: CPT | Performed by: FAMILY MEDICINE

## 2022-03-21 NOTE — PROGRESS NOTES
FAMILY PRACTICE OFFICE VISIT       NAME: Itzel Green  AGE: 62 y o  SEX: female       : 1965        MRN: [id number]        Assessment and Plan     1  Pyelonephritis  Assessment & Plan:  · Diagnosed by kidney appearance on recent CT     · 2 urine cultures were negative  · Patient presented with leukocytosis, high CRP, chills and decreased appetite  · Patient denies symptoms of frequency, urgency, hematuria or flank pain  She reports interim resolution of chills and significant improvement of fatigue and appetite  · Patient will complete 2 week course of Keflex 500 mg b i d  Stanton Marroquin · She will proceed with blood work in a few weeks and will schedule renal/bladder ultrasound in 4 weeks  We discussed importance of proper hydration, patient should force fluids  Orders:  -     US kidney and bladder with pvr; Future; Expected date: 2022  -     C-reactive protein; Future    2  Hepatic lesion  Assessment & Plan:  Incidental finding on recent CT  Appearance most likely consistent with hemangioma as per radiologist     Patient will proceed with MRI as per radiology recommendation to confirm diagnosis  Liver function tests have been normal, she denies symptoms of nausea, vomiting or dyspepsia  Epigastric abdominal pain has resolved      Orders:  -     MRI abdomen w wo contrast; Future; Expected date: 2022  -     CBC and differential; Future  -     Comprehensive metabolic panel; Future    3  Dyslipidemia  -     Lipid Panel with Direct LDL reflex; Future  -     TSH, 3rd generation; Future    4  Vitamin D deficiency  -     Vitamin D 25 hydroxy; Future    Patient presents for follow-up  She feels significantly better  She denies symptoms of chills or abdominal pain  Fatigue has improved  Appetite is better  She remains on Keflex for recent diagnosis of pyelonephritis  Patient reports of residual headaches, they respond well to p r n  Excedrin  I advised her to continue forcing fluids  She may benefit from 1 serving of Gatorade daily for the next few days, she is likely dehydrated due to lack of appetite within past few weeks  Patient will contact me if symptoms are not continuing to improve  I will be in touch pending results of renal ultrasound, blood work and abdominal MRI  There are no Patient Instructions on file for this visit  Return if symptoms worsen or fail to improve  Discussed with the patient and all questioned fully answered  She will call me if any problems arise  M*Modal software was used to dictate this note  It may contain errors with dictating incorrect words/spelling  Please contact provider directly with any questions  Chief Complaint     Chief Complaint   Patient presents with    Follow-up   [facility] Colonoscopy     Ordered last year, still not scheduled    Fatigue    Headache    Rash     Trunk-front and back  not itchy, bumps,red       History of Present Illness     Patient presents for follow-up after recent emergency room evaluation  She is feeling significantly better  Appetite has improved  Abdominal pain has improved  She denies symptoms of chills, fevers, dysuria, flank pain or hematuria  She was seen in emergency room due to persistent symptoms of fatigue and chills  CT abdomen and pelvis was consistent with pyelonephritis  Patient has not experienced any urinary tract symptoms throughout this illness  Incidental finding of right hepatic liver lesion, most likely consistent with hemangioma  Radiologist recommended further follow-up with MRI for detailed Melonie Douse  Patient was prescribed Keflex 500 mg twice a day for 2 weeks  She remains on medication as directed  She is still complaining of headaches that usually occur at night  Patient is able to fall back asleep  She uses Excedrin 1 daily, p r n , it works very well         Extensive workup for symptoms of chills headaches and severe fatigue was unremarkable including 2- urine cultures  Pyelonephritis was diagnosed based on CT results, due to kidney appearance  COVID testing was negative  Patient with personal history of COVID back in October of 2021  She is not vaccinated     Fatigue  Associated symptoms include fatigue, headaches and a rash  Pertinent negatives include no chills or fever  Headache   Pertinent negatives include no fever  Rash  Associated symptoms include fatigue  Pertinent negatives include no fever  Review of Systems   Review of Systems   Constitutional: Positive for appetite change (Appetite has improved) and fatigue  Negative for chills and fever  HENT: Negative  Respiratory: Negative  Cardiovascular: Negative  Gastrointestinal: Negative  Genitourinary: Negative for dysuria, flank pain, frequency and hematuria  Musculoskeletal: Negative  Skin: Positive for rash  Allergic/Immunologic: Negative  Neurological: Positive for headaches  Hematological: Negative  Psychiatric/Behavioral: Negative  Active Problem List     Patient Active Problem List   Diagnosis    Biliary dyskinesia    Depression with anxiety    Dyslipidemia    Insomnia    TMJ arthralgia    Chronic right-sided low back pain with right-sided sciatica    Trigger finger, right middle finger    Trigger finger, right ring finger    Tobacco use    Encounter for wellness examination in adult    Chills    Pyelonephritis    Hepatic lesion       Past Medical History:  Past Medical History:   Diagnosis Date    Headache        Past Surgical History:  History reviewed  No pertinent surgical history      Family History:  Family History   Problem Relation Age of Onset    Seizures Sister     Lupus Sister        Social History:  Social History     Socioeconomic History    Marital status: Legally      Spouse name: Not on file    Number of children: Not on file    Years of education: Not on file    Highest education level: Not on file   Occupational History     Employer: [employer]   Tobacco Use    Smoking status: Current Every Day Smoker     Packs/day: 0 50     Years: 25 00     Pack years: 12 50    Smokeless tobacco: Never Used   Vaping Use    Vaping Use: Never used   Substance and Sexual Activity    Alcohol use: Yes     Comment: occasionally    Drug use: No    Sexual activity: Not on file   Other Topics Concern    Not on file   Social History Narrative    Not on file     Social Determinants of Health     Financial Resource Strain: Not on file   Food Insecurity: Not on file   Transportation Needs: Not on file   Physical Activity: Not on file   Stress: Not on file   Social Connections: Not on file   Intimate Partner Violence: Not on file   Housing Stability: Not on file         Objective     Vitals:    03/21/22 1440   BP: 100/58   BP Location: Left arm   Patient Position: Sitting   Cuff Size: Standard   Pulse: 65   Resp: 16   Temp: 98 °F (36 7 °C)   TempSrc: Temporal   SpO2: 97%   Weight: 54 4 kg (120 lb)   Height: 5' 2" (1 575 m)       Wt Readings from Last 3 Encounters:   03/21/22 54 4 kg (120 lb)   03/16/22 54 4 kg (120 lb)   03/14/22 56 2 kg (124 lb)       Physical Exam  Vitals and nursing note reviewed  Constitutional:       General: She is not in acute distress  Appearance: Normal appearance  She is well-developed  She is not ill-appearing  HENT:      Head: Normocephalic and atraumatic  Eyes:      Conjunctiva/sclera: Conjunctivae normal    Neck:      Thyroid: No thyromegaly  Vascular: No carotid bruit  Cardiovascular:      Rate and Rhythm: Normal rate and regular rhythm  Heart sounds: Normal heart sounds  No murmur heard  Pulmonary:      Effort: Pulmonary effort is normal  No respiratory distress  Breath sounds: Normal breath sounds  No wheezing  Abdominal:      General: Bowel sounds are normal  There is no distension or abdominal bruit  Palpations: Abdomen is soft  Tenderness:  There is no abdominal tenderness  Musculoskeletal:         General: Normal range of motion  Cervical back: Neck supple  No rigidity  Right lower leg: No edema  Left lower leg: No edema  Lymphadenopathy:      Cervical: No cervical adenopathy  Neurological:      General: No focal deficit present  Mental Status: She is alert and oriented to person, place, and time  Cranial Nerves: No cranial nerve deficit  Coordination: Coordination normal    Psychiatric:         Mood and Affect: Mood normal          Behavior: Behavior normal          Thought Content:  Thought content normal           Pertinent Laboratory/Diagnostic Studies:    Lab Results   Component Value Date    WBC 12 06 (H) 03/16/2022    HGB 11 6 03/16/2022    HCT 35 8 03/16/2022    MCV 95 03/16/2022     03/16/2022       No results found for: TSH    Lab Results   Component Value Date    CHOL 204 (H) 05/02/2017     Lab Results   Component Value Date    TRIG 117 05/02/2017     Lab Results   Component Value Date    HDL 58 05/02/2017     No results found for: LDLCALC  No results found for: HGBA1C  Lab Results   Component Value Date    SODIUM 136 03/16/2022    K 4 1 03/16/2022     03/16/2022    CO2 28 03/16/2022    AGAP 5 03/16/2022    BUN 12 03/16/2022    CREATININE 0 68 03/16/2022    GLUC 106 03/16/2022    CALCIUM 9 1 03/16/2022    AST 19 03/16/2022    ALT 49 03/16/2022    ALKPHOS 170 (H) 03/16/2022    PROT 6 3 05/02/2017    TP 7 2 03/16/2022    BILITOT 0 5 05/02/2017    TBILI 0 44 03/16/2022    EGFR 97 03/16/2022       Orders Placed This Encounter   Procedures    MRI abdomen w wo contrast    US kidney and bladder with pvr    CBC and differential    Comprehensive metabolic panel    Lipid Panel with Direct LDL reflex    TSH, 3rd generation    Vitamin D 25 hydroxy    C-reactive protein       ALLERGIES:  Allergies   Allergen Reactions    Azithromycin Nausea Only    Iodine - Food Allergy Swelling and Edema     Reaction Date: [id number];     Zoloft [Sertraline]      Worsening of anxiety       Current Medications     Current Outpatient Medications   Medication Sig Dispense Refill    cephalexin (KEFLEX) 500 mg capsule Take 1 capsule (500 mg total) by mouth every 12 (twelve) hours for 14 days 28 capsule 0    famotidine (PEPCID) 20 mg tablet Take 1 tablet (20 mg total) by mouth 2 (two) times a day 60 tablet 0    PARoxetine (PAXIL-CR) 25 mg 24 hr tablet TAKE 1 TABLET BY MOUTH EVERY MORNING 90 tablet 3     No current facility-administered medications for this visit  There are no discontinued medications      Health Maintenance     Health Maintenance   Topic Date Due    Hepatitis C Screening  Never done    Pneumococcal Vaccine: Pediatrics (0 to 5 Years) and At-Risk Patients (6 to 59 Years) (1 of 2 - PPSV23) Never done    HIV Screening  Never done    DTaP,Tdap,and Td Vaccines (1 - Tdap) Never done    Breast Cancer Screening: Mammogram  Never done    Colorectal Cancer Screening  Never done    COVID-19 Vaccine (1) 06/14/2022 (Originally 3/9/1970)    Influenza Vaccine (1) 06/30/2022 (Originally 9/1/2021)    Annual Physical  08/02/2022    BMI: Adult  03/21/2023    Cervical Cancer Screening  10/11/2024    HIB Vaccine  Aged Out    Hepatitis B Vaccine  Aged Out    IPV Vaccine  Aged Out    Hepatitis A Vaccine  Aged Out    Meningococcal ACWY Vaccine  Aged Out    HPV Vaccine  Aged Out       Immunization History   Administered Date(s) Administered    INFLUENZA 11/01/2018    Influenza, recombinant, quadrivalent,injectable, preservative free 12/10/2019    Influenza, seasonal, injectable, preservative free 11/01/2018       Adelaide Vázquez MD

## 2022-03-25 PROBLEM — K76.9 HEPATIC LESION: Status: ACTIVE | Noted: 2022-03-25

## 2022-03-25 PROBLEM — N12 PYELONEPHRITIS: Status: ACTIVE | Noted: 2022-03-25

## 2022-03-25 NOTE — ASSESSMENT & PLAN NOTE
Incidental finding on recent CT  Appearance most likely consistent with hemangioma as per radiologist     Patient will proceed with MRI as per radiology recommendation to confirm diagnosis  Liver function tests have been normal, she denies symptoms of nausea, vomiting or dyspepsia    Epigastric abdominal pain has resolved

## 2022-03-25 NOTE — ASSESSMENT & PLAN NOTE
· Diagnosed by kidney appearance on recent CT     · 2 urine cultures were negative  · Patient presented with leukocytosis, high CRP, chills and decreased appetite  · Patient denies symptoms of frequency, urgency, hematuria or flank pain  She reports interim resolution of chills and significant improvement of fatigue and appetite  · Patient will complete 2 week course of Keflex 500 mg b i d  Cynthia Huang · She will proceed with blood work in a few weeks and will schedule renal/bladder ultrasound in 4 weeks  We discussed importance of proper hydration, patient should force fluids

## 2022-04-18 ENCOUNTER — HOSPITAL ENCOUNTER (OUTPATIENT)
Dept: RADIOLOGY | Facility: HOSPITAL | Age: 57
Discharge: HOME | End: 2022-04-18
Payer: COMMERCIAL

## 2022-04-18 DIAGNOSIS — N12 PYELONEPHRITIS: ICD-10-CM

## 2022-04-18 PROCEDURE — 76770 US EXAM ABDO BACK WALL COMP: CPT

## 2022-04-27 ENCOUNTER — HOSPITAL ENCOUNTER (OUTPATIENT)
Dept: RADIOLOGY | Facility: HOSPITAL | Age: 57
Discharge: HOME | End: 2022-04-27
Payer: COMMERCIAL

## 2022-04-27 DIAGNOSIS — K76.9 HEPATIC LESION: ICD-10-CM

## 2022-04-27 PROCEDURE — 74183 MRI ABD W/O CNTR FLWD CNTR: CPT

## 2022-04-27 PROCEDURE — A9585 GADOBUTROL INJECTION: HCPCS | Performed by: FAMILY MEDICINE

## 2022-04-27 RX ADMIN — GADOBUTROL 6 ML: 604.72 INJECTION INTRAVENOUS at 07:56

## 2022-05-03 ENCOUNTER — TELEPHONE (OUTPATIENT)
Dept: FAMILY MEDICINE CLINIC | Facility: CLINIC | Age: 57
End: 2022-05-03

## 2022-05-03 DIAGNOSIS — K76.9 HEPATIC LESION: Primary | ICD-10-CM

## 2022-05-03 PROBLEM — R91.1 PULMONARY NODULE: Status: ACTIVE | Noted: 2022-05-03

## 2022-05-03 NOTE — TELEPHONE ENCOUNTER
Spoke with patient  Made aware of results and provider's instructions  Patient verbalized understanding and was agreeable w/ plan

## 2022-05-03 NOTE — TELEPHONE ENCOUNTER
Please contact patient  I reviewed results of recent liver MRI  Radiologist does describe approximately half an inch liver lesion  Unfortunately with all advanced MRI technology he is not able to determine the true etiology of this lesion  He recommends another MRI with different type of contrast to establish diagnosis and proper follow-up  My recommendation is to schedule consultation with gastroenterologists/liver specialist prior to proceeding with more imaging studies  I will place a referral   Please advised patient to proceed        Thank you

## 2022-05-07 ENCOUNTER — HOSPITAL ENCOUNTER (OUTPATIENT)
Dept: MAMMOGRAPHY | Facility: HOSPITAL | Age: 57
Discharge: HOME | End: 2022-05-07
Payer: COMMERCIAL

## 2022-05-07 DIAGNOSIS — Z12.31 ENCOUNTER FOR SCREENING MAMMOGRAM FOR MALIGNANT NEOPLASM OF BREAST: ICD-10-CM

## 2022-05-07 DIAGNOSIS — Z12.31 BREAST CANCER SCREENING BY MAMMOGRAM: ICD-10-CM

## 2022-05-07 PROCEDURE — 77067 SCR MAMMO BI INCL CAD: CPT

## 2022-05-07 PROCEDURE — 77063 BREAST TOMOSYNTHESIS BI: CPT

## 2022-05-12 ENCOUNTER — TELEPHONE (OUTPATIENT)
Dept: FAMILY MEDICINE CLINIC | Facility: CLINIC | Age: 57
End: 2022-05-12

## 2022-05-12 NOTE — TELEPHONE ENCOUNTER
----- Message from 08Singly sent at 5/12/2022  1:22 PM EDT -----  Please make sure patient has been contacted regarding mammogram results  There is an asymmetry in the left breast for unclear reason, further imaging is needed to evaluate this, diagnostic mammogram and ultrasound  Orders placed  Please schedule

## 2022-05-16 LAB
25(OH)D3 SERPL-MCNC: 27 NG/ML (ref 30–100)
ALBUMIN SERPL-MCNC: 4.2 G/DL (ref 3.6–5.1)
ALBUMIN/GLOB SERPL: 2.3 (CALC) (ref 1–2.5)
ALP SERPL-CCNC: 66 U/L (ref 37–153)
ALT SERPL-CCNC: 10 U/L (ref 6–29)
AST SERPL-CCNC: 13 U/L (ref 10–35)
BASOPHILS # BLD AUTO: 62 CELLS/UL (ref 0–200)
BASOPHILS NFR BLD AUTO: 0.8 %
BILIRUB SERPL-MCNC: 0.6 MG/DL (ref 0.2–1.2)
BUN SERPL-MCNC: 17 MG/DL (ref 7–25)
BUN/CREAT SERPL: ABNORMAL (CALC) (ref 6–22)
CALCIUM SERPL-MCNC: 9.3 MG/DL (ref 8.6–10.4)
CHLORIDE SERPL-SCNC: 102 MMOL/L (ref 98–110)
CHOLEST SERPL-MCNC: 199 MG/DL
CHOLEST/HDLC SERPL: 3.3 (CALC)
CO2 SERPL-SCNC: 28 MMOL/L (ref 20–32)
CREAT SERPL-MCNC: 0.69 MG/DL (ref 0.5–1.05)
CRP SERPL-MCNC: 0.9 MG/L
EOSINOPHIL # BLD AUTO: 226 CELLS/UL (ref 15–500)
EOSINOPHIL NFR BLD AUTO: 2.9 %
ERYTHROCYTE [DISTWIDTH] IN BLOOD BY AUTOMATED COUNT: 12.9 % (ref 11–15)
GLOBULIN SER CALC-MCNC: 1.8 G/DL (CALC) (ref 1.9–3.7)
GLUCOSE SERPL-MCNC: 107 MG/DL (ref 65–99)
HCT VFR BLD AUTO: 38.1 % (ref 35–45)
HDLC SERPL-MCNC: 61 MG/DL
HGB BLD-MCNC: 12.7 G/DL (ref 11.7–15.5)
LDLC SERPL CALC-MCNC: 118 MG/DL (CALC)
LYMPHOCYTES # BLD AUTO: 2145 CELLS/UL (ref 850–3900)
LYMPHOCYTES NFR BLD AUTO: 27.5 %
MCH RBC QN AUTO: 31.8 PG (ref 27–33)
MCHC RBC AUTO-ENTMCNC: 33.3 G/DL (ref 32–36)
MCV RBC AUTO: 95.5 FL (ref 80–100)
MONOCYTES # BLD AUTO: 585 CELLS/UL (ref 200–950)
MONOCYTES NFR BLD AUTO: 7.5 %
NEUTROPHILS # BLD AUTO: 4781 CELLS/UL (ref 1500–7800)
NEUTROPHILS NFR BLD AUTO: 61.3 %
NONHDLC SERPL-MCNC: 138 MG/DL (CALC)
PLATELET # BLD AUTO: 254 THOUSAND/UL (ref 140–400)
PMV BLD REES-ECKER: 10.1 FL (ref 7.5–12.5)
POTASSIUM SERPL-SCNC: 4.9 MMOL/L (ref 3.5–5.3)
PROT SERPL-MCNC: 6 G/DL (ref 6.1–8.1)
RBC # BLD AUTO: 3.99 MILLION/UL (ref 3.8–5.1)
SL AMB EGFR AFRICAN AMERICAN: 112 ML/MIN/1.73M2
SL AMB EGFR NON AFRICAN AMERICAN: 97 ML/MIN/1.73M2
SODIUM SERPL-SCNC: 136 MMOL/L (ref 135–146)
TRIGL SERPL-MCNC: 96 MG/DL
TSH SERPL-ACNC: 1.99 MIU/L (ref 0.4–4.5)
WBC # BLD AUTO: 7.8 THOUSAND/UL (ref 3.8–10.8)

## 2022-05-17 ENCOUNTER — TELEPHONE (OUTPATIENT)
Dept: FAMILY MEDICINE CLINIC | Facility: CLINIC | Age: 57
End: 2022-05-17

## 2022-05-17 NOTE — TELEPHONE ENCOUNTER
----- Message from Panfilo Ellis MD sent at 5/17/2022  4:03 PM EDT -----  Please contact patient  Her blood work was essentially normal aside from slightly decreased level of vitamin-D  Her or test for inflammation, C-reactive protein has normalized which is great news!    I do see that patient is scheduled to see me on June 2nd for ongoing symptoms of abdominal pain  I if needed-I can see her tomorrow at 10:30 a m       Thank you

## 2022-05-17 NOTE — TELEPHONE ENCOUNTER
Spoke with patient  Made aware of results and provider's instructions  Patient verbalized understanding and was agreeable w/ plan     Pt will come in tomorrow at 10:30 am      Front office, please add pt to Dr Willa Obrien schedule for tomorrow at 10:30 am

## 2022-05-18 ENCOUNTER — OFFICE VISIT (OUTPATIENT)
Dept: FAMILY MEDICINE CLINIC | Facility: CLINIC | Age: 57
End: 2022-05-18
Payer: COMMERCIAL

## 2022-05-18 VITALS
SYSTOLIC BLOOD PRESSURE: 100 MMHG | TEMPERATURE: 97.8 F | BODY MASS INDEX: 22.82 KG/M2 | WEIGHT: 124 LBS | HEIGHT: 62 IN | OXYGEN SATURATION: 98 % | RESPIRATION RATE: 16 BRPM | DIASTOLIC BLOOD PRESSURE: 60 MMHG | HEART RATE: 68 BPM

## 2022-05-18 DIAGNOSIS — K76.9 HEPATIC LESION: ICD-10-CM

## 2022-05-18 DIAGNOSIS — R92.8 ABNORMAL MAMMOGRAM OF LEFT BREAST: ICD-10-CM

## 2022-05-18 DIAGNOSIS — R39.9 UTI SYMPTOMS: Primary | ICD-10-CM

## 2022-05-18 LAB
SL AMB  POCT GLUCOSE, UA: ABNORMAL
SL AMB LEUKOCYTE ESTERASE,UA: ABNORMAL
SL AMB POCT BILIRUBIN,UA: ABNORMAL
SL AMB POCT BLOOD,UA: ABNORMAL
SL AMB POCT CLARITY,UA: CLEAR
SL AMB POCT COLOR,UA: ABNORMAL
SL AMB POCT KETONES,UA: ABNORMAL
SL AMB POCT NITRITE,UA: ABNORMAL
SL AMB POCT PH,UA: 6
SL AMB POCT SPECIFIC GRAVITY,UA: 1
SL AMB POCT URINE PROTEIN: ABNORMAL
SL AMB POCT UROBILINOGEN: 0.2

## 2022-05-18 PROCEDURE — 81003 URINALYSIS AUTO W/O SCOPE: CPT | Performed by: FAMILY MEDICINE

## 2022-05-18 PROCEDURE — 3008F BODY MASS INDEX DOCD: CPT | Performed by: FAMILY MEDICINE

## 2022-05-18 PROCEDURE — 4004F PT TOBACCO SCREEN RCVD TLK: CPT | Performed by: FAMILY MEDICINE

## 2022-05-18 PROCEDURE — 99214 OFFICE O/P EST MOD 30 MIN: CPT | Performed by: FAMILY MEDICINE

## 2022-05-18 PROCEDURE — 87086 URINE CULTURE/COLONY COUNT: CPT | Performed by: FAMILY MEDICINE

## 2022-05-18 RX ORDER — SULFAMETHOXAZOLE AND TRIMETHOPRIM 800; 160 MG/1; MG/1
1 TABLET ORAL 2 TIMES DAILY
Qty: 14 TABLET | Refills: 0 | Status: SHIPPED | OUTPATIENT
Start: 2022-05-18 | End: 2022-05-25

## 2022-05-18 NOTE — PATIENT INSTRUCTIONS
Please start vitamin D3 capsules 2,000 units once a day  Please schedule consultation of with gastroenterologists/liver specialist  Please schedule left diagnostic mammogram and ultrasound  Please start cranberry capsules over-the-counter  I suspect that you symptoms now are caused by bladder infection  Will start antibiotic, Bactrim for 7 days  Please drink plenty of fluids  Please call our office in a few days if you symptoms are not improving    Urine culture is pending

## 2022-05-18 NOTE — PROGRESS NOTES
FAMILY PRACTICE OFFICE VISIT       NAME: Cherry Garcia  AGE: 62 y o  SEX: female       : 1965        MRN: [id number]        Assessment and Plan     1  UTI symptoms  Assessment & Plan:  Patient presents with days of suprapubic pressure and for urinary frequency  She denies symptoms of flank pain, hematuria fever  Episode of pyelonephritis in 2022, treated with 2 weeks of Keflex and was essentially asymptomatic till onset of fever and severe fatigue  Patient reports episode of UTI in April described as cloudy urine, treated by her gyn with 3 days of Bactrim  Symptoms improved and now are recurring  Proceed with UA C&S  Start Bactrim for 7 days  Patient will contact our office if symptoms are not improving  Patient is up-to-date with routine gyn care/exam  Unremarkable kidney/bladder ultrasound 2022    Orders:  -     POCT urine dip auto non-scope  -     Urine culture; Future  -     Urine culture  -     sulfamethoxazole-trimethoprim (BACTRIM DS) 800-160 mg per tablet; Take 1 tablet by mouth in the morning and 1 tablet in the evening  Do all this for 7 days  2  Hepatic lesion  Assessment & Plan:  1 5 cm hepatic lesion noted on CT abdomen and pelvis 2022 and then followed with MRI of abdomen in May of 2022  According to recent MRI :"differential considerations to include atypical hemangioma or other primary/ secondary lliver lesions  Short-term follow-up MRI with EOVIST contrast is recommended to evaluate stability and attempt further characterization"  I recommend to proceed with consultation with Gastroenterology prior to scheduling further diagnostic/imaging testing  Patient understands and will schedule evaluation with [facility] Gastroenterology  Recent blood work reveals normal liver function tests      3  Abnormal mammogram of left breast  Assessment & Plan:  Recent screening mammogram performed in early May 2022 reveals left breast asymmetry    I explained patient about increased sensitivity of 3D mammogram and recent report of left breast asymmetry  I strongly advised her to proceed with diagnostic mammogram and ultrasound  She understands my instructions and is agreeable to schedule follow-up testing               Patient Instructions   Please start vitamin D3 capsules 2,000 units once a day  Please schedule consultation of with gastroenterologists/liver specialist  Please schedule left diagnostic mammogram and ultrasound  Please start cranberry capsules over-the-counter  I suspect that you symptoms now are caused by bladder infection  Will start antibiotic, Bactrim for 7 days  Please drink plenty of fluids  Please call our office in a few days if you symptoms are not improving  Urine culture is pending      No follow-ups on file  Discussed with the patient and all questioned fully answered  She will call me if any problems arise  M*the grafter software was used to dictate this note  It may contain errors with dictating incorrect words/spelling  Please contact provider directly with any questions  Chief Complaint     Chief Complaint   Patient presents with    Abdominal Pain    Bladder Discomfort    Results     Refusing to do f/u for abnormal mammogram- Diagnostic Mammo and [facility] Colonoscopy     Will not proceed until 2023       History of Present Illness     Follow up     Recent mammography results reviewed  Radiologist describes asymmetry in the left breast in recommends further evaluation with diagnostic mammogram and left breast ultrasound  Patient is not quite sure why further testing is necessary  Reviewed results of recent blood work  Vitamin-D level is slightly decreased at 27  Otherwise all blood work is normal   C-reactive protein is back to normal     Patient is complaining of suprapubic discomfort and mild dysuria within past 10 days  She feels that she is urinating in small amounts  No nausea vomiting, no flank pain, no fever  Patient was recently treated for episode of pyelonephritis  Subsequent kidney ultrasound was normal     We also discussed results of recent MRI of abdomen, it revealed right hepatic lobe lesion of 1 5 cm was previously reported on CT abdomen and pelvis  I recommend to proceed with consultation by Gastroenterology  Patient is UTD with GYN examination  Patient reports recent episode of cloudy urine/ UTI symptoms, she contacted her gyn and has received Bactrim Rx in April for 3 days  Medication worked and she has recovered  Abdominal Pain  Associated symptoms include dysuria and frequency  Pertinent negatives include no constipation, diarrhea or nausea  Review of Systems   Review of Systems   Constitutional: Negative  HENT: Negative  Eyes: Negative  Respiratory: Negative  Gastrointestinal: Positive for abdominal pain (suprapubic)  Negative for abdominal distention, constipation, diarrhea and nausea  Endocrine: Negative  Genitourinary: Positive for dysuria and frequency  Negative for flank pain and urgency  Musculoskeletal: Negative  Skin: Negative  Allergic/Immunologic: Negative  Neurological: Negative  Hematological: Negative  Psychiatric/Behavioral: Negative          Active Problem List     Patient Active Problem List   Diagnosis    Biliary dyskinesia    Depression with anxiety    Dyslipidemia    Insomnia    TMJ arthralgia    Chronic right-sided low back pain with right-sided sciatica    Trigger finger, right middle finger    Trigger finger, right ring finger    Tobacco use    Encounter for wellness examination in adult    Chills    Hepatic lesion    Pulmonary nodule    UTI symptoms    Abnormal mammogram of left breast       Past Medical History:  Past Medical History:   Diagnosis Date    Headache     Pyelonephritis 3/25/2022       Past Surgical History:  Past Surgical History:   Procedure Laterality Date    BREAST CYST EXCISION Right benign       Family History:  Family History   Problem Relation Age of Onset    Seizures Sister     Lupus Sister        Social History:  Social History     Socioeconomic History    Marital status: Legally      Spouse name: Not on file    Number of children: Not on file    Years of education: Not on file    Highest education level: Not on file   Occupational History     Employer: [employer]   Tobacco Use    Smoking status: Current Every Day Smoker     Packs/day: 0 50     Years: 25 00     Pack years: 12 50    Smokeless tobacco: Never Used   Vaping Use    Vaping Use: Never used   Substance and Sexual Activity    Alcohol use: Yes     Comment: occasionally    Drug use: No    Sexual activity: Not on file   Other Topics Concern    Not on file   Social History Narrative    Not on file     Social Determinants of Health     Financial Resource Strain: Not on file   Food Insecurity: Not on file   Transportation Needs: Not on file   Physical Activity: Not on file   Stress: Not on file   Social Connections: Not on file   Intimate Partner Violence: Not on file   Housing Stability: Not on file         Objective     Vitals:    05/18/22 1039   BP: 100/60   BP Location: Left arm   Patient Position: Sitting   Cuff Size: Standard   Pulse: 68   Resp: 16   Temp: 97 8 °F (36 6 °C)   TempSrc: Temporal   SpO2: 98%   Weight: 56 2 kg (124 lb)   Height: 5' 2" (1 575 m)       Wt Readings from Last 3 Encounters:   05/18/22 56 2 kg (124 lb)   03/21/22 54 4 kg (120 lb)   03/16/22 54 4 kg (120 lb)       Physical Exam  Vitals and nursing note reviewed  Constitutional:       General: She is not in acute distress  Appearance: Normal appearance  She is well-developed  She is not ill-appearing  HENT:      Head: Normocephalic and atraumatic  Eyes:      Conjunctiva/sclera: Conjunctivae normal    Neck:      Thyroid: No thyromegaly  Vascular: No carotid bruit     Cardiovascular:      Rate and Rhythm: Normal rate and regular rhythm  Heart sounds: Normal heart sounds  No murmur heard  Pulmonary:      Effort: Pulmonary effort is normal  No respiratory distress  Breath sounds: Normal breath sounds  No wheezing  Abdominal:      General: Abdomen is flat  Bowel sounds are normal  There is no distension or abdominal bruit  Palpations: Abdomen is soft  Tenderness: There is abdominal tenderness in the suprapubic area  There is no right CVA tenderness, left CVA tenderness, guarding or rebound  Hernia: No hernia is present  Musculoskeletal:         General: Normal range of motion  Cervical back: Neck supple  No rigidity  Right lower leg: No edema  Left lower leg: No edema  Skin:     General: Skin is warm  Neurological:      General: No focal deficit present  Mental Status: She is alert and oriented to person, place, and time  Cranial Nerves: No cranial nerve deficit  Coordination: Coordination normal    Psychiatric:         Mood and Affect: Mood normal          Behavior: Behavior normal          Thought Content:  Thought content normal           Pertinent Laboratory/Diagnostic Studies:    Lab Results   Component Value Date    WBC 7 8 05/16/2022    HGB 12 7 05/16/2022    HCT 38 1 05/16/2022    MCV 95 5 05/16/2022     05/16/2022       Lab Results   Component Value Date    TSH 1 99 05/16/2022       Lab Results   Component Value Date    CHOL 204 (H) 05/02/2017     Lab Results   Component Value Date    TRIG 96 05/16/2022     Lab Results   Component Value Date    HDL 61 05/16/2022     Lab Results   Component Value Date    LDLCALC 118 (H) 05/16/2022     No results found for: HGBA1C  Lab Results   Component Value Date    SODIUM 136 05/16/2022    K 4 9 05/16/2022     05/16/2022    CO2 28 05/16/2022    AGAP 5 03/16/2022    BUN 17 05/16/2022    CREATININE 0 69 05/16/2022    GLUC 107 (H) 05/16/2022    CALCIUM 9 3 05/16/2022    AST 13 05/16/2022    ALT 10 05/16/2022    ALKPHOS 66 05/16/2022    PROT 6 3 05/02/2017    TP 6 0 (L) 05/16/2022    BILITOT 0 5 05/02/2017    TBILI 0 6 05/16/2022    EGFR 97 03/16/2022       Orders Placed This Encounter   Procedures    Urine culture    POCT urine dip auto non-scope       ALLERGIES:  Allergies   Allergen Reactions    Azithromycin Nausea Only    Iodine - Food Allergy Swelling and Edema     Reaction Date: 14Sep2011;     Zoloft [Sertraline]      Worsening of anxiety       Current Medications     Current Outpatient Medications   Medication Sig Dispense Refill    PARoxetine (PAXIL-CR) 25 mg 24 hr tablet TAKE 1 TABLET BY MOUTH EVERY MORNING 90 tablet 3    sulfamethoxazole-trimethoprim (BACTRIM DS) 800-160 mg per tablet Take 1 tablet by mouth in the morning and 1 tablet in the evening  Do all this for 7 days  14 tablet 0    famotidine (PEPCID) 20 mg tablet Take 1 tablet (20 mg total) by mouth 2 (two) times a day (Patient not taking: Reported on 5/18/2022) 60 tablet 0     No current facility-administered medications for this visit  There are no discontinued medications      Health Maintenance     Health Maintenance   Topic Date Due    Hepatitis C Screening  Never done    Pneumococcal Vaccine: Pediatrics (0 to 5 Years) and At-Risk Patients (6 to 59 Years) (1 - PCV) Never done    HIV Screening  Never done    DTaP,Tdap,and Td Vaccines (1 - Tdap) Never done    Colorectal Cancer Screening  Never done    Annual Physical  08/02/2022    COVID-19 Vaccine (1) 06/14/2022 (Originally 3/9/1970)    Influenza Vaccine (Season Ended) 09/01/2022    BMI: Adult  05/18/2023    Breast Cancer Screening: Mammogram  05/07/2024    Cervical Cancer Screening  10/11/2024    HIB Vaccine  Aged Out    Hepatitis B Vaccine  Aged Out    IPV Vaccine  Aged Out    Hepatitis A Vaccine  Aged Out    Meningococcal ACWY Vaccine  Aged Out    HPV Vaccine  Aged [employer] History   Administered Date(s) Administered    INFLUENZA 11/01/2018    Influenza, recombinant, quadrivalent,injectable, preservative free 12/10/2019    Influenza, seasonal, injectable, preservative free 11/01/2018       Stefan Antonio MD

## 2022-05-19 ENCOUNTER — TELEPHONE (OUTPATIENT)
Dept: ULTRASOUND IMAGING | Facility: CLINIC | Age: 57
End: 2022-05-19

## 2022-05-21 LAB — BACTERIA UR CULT: ABNORMAL

## 2022-05-22 PROBLEM — R39.9 UTI SYMPTOMS: Status: ACTIVE | Noted: 2022-05-22

## 2022-05-22 PROBLEM — R92.8 ABNORMAL MAMMOGRAM OF LEFT BREAST: Status: ACTIVE | Noted: 2022-05-22

## 2022-05-22 PROBLEM — N12 PYELONEPHRITIS: Status: RESOLVED | Noted: 2022-03-25 | Resolved: 2022-05-22

## 2022-05-22 NOTE — ASSESSMENT & PLAN NOTE
Recent screening mammogram performed in early May 2022 reveals left breast asymmetry  I explained patient about increased sensitivity of 3D mammogram and recent report of left breast asymmetry  I strongly advised her to proceed with diagnostic mammogram and ultrasound    She understands my instructions and is agreeable to schedule follow-up testing

## 2022-05-22 NOTE — ASSESSMENT & PLAN NOTE
· Patient presents with days of suprapubic pressure and for urinary frequency  · She denies symptoms of flank pain, hematuria fever  · Episode of pyelonephritis in March 2022, treated with 2 weeks of Keflex and was essentially asymptomatic till onset of fever and severe fatigue  · Patient reports episode of UTI in April described as cloudy urine, treated by her gyn with 3 days of Bactrim  Symptoms improved and now are recurring    · Proceed with UA C&S  · Start Bactrim for 7 days  · Patient will contact our office if symptoms are not improving  · Patient is up-to-date with routine gyn care/exam  · Unremarkable kidney/bladder ultrasound April 2022

## 2022-05-22 NOTE — ASSESSMENT & PLAN NOTE
1 5 cm hepatic lesion noted on CT abdomen and pelvis March 2022 and then followed with MRI of abdomen in May of 2022  According to recent MRI :"differential considerations to include atypical hemangioma or other primary/ secondary lliver lesions  Short-term follow-up MRI with EOVIST contrast is recommended to evaluate stability and attempt further characterization"  I recommend to proceed with consultation with Gastroenterology prior to scheduling further diagnostic/imaging testing      Patient understands and will schedule evaluation with [facility] Gastroenterology  Recent blood work reveals normal liver function tests

## 2022-05-31 ENCOUNTER — TELEPHONE (OUTPATIENT)
Dept: MAMMOGRAPHY | Facility: CLINIC | Age: 57
End: 2022-05-31

## 2022-05-31 NOTE — TELEPHONE ENCOUNTER
Epic email sent to HARESH Calderon,    We were unsuccessful in contacting the above patient for her diagnostic follow up mammogram/ultrasound  I have left messages for her on 5/12 and 5/19 with no response to schedule  Please attempt to contact this patient and have them schedule their appointment  Please let me know if you have any questions or if I can be of any further assistance      Thank you,  Sim Santana, MSN, RN, CN-BN  Breast Nurse Navigator

## 2022-07-05 ENCOUNTER — TELEPHONE (OUTPATIENT)
Dept: OBGYN CLINIC | Facility: OTHER | Age: 57
End: 2022-07-05

## 2022-07-05 NOTE — TELEPHONE ENCOUNTER
email sent to [facility]!!     I have a patient that said she had a missed call from us to schedule her for Dr Wanda Andino, I am not seeing that in here but she is in need of a Dr Wanda Andino for her right hand        She saw Dr Wanda Andino in 2019     Patient is :  Jeana Arredondo   : [birth year]-  MRN : [id number]  C/b # [phone]  Reason for Appointment : Right Hand Trigger  Requested Doctor & Location : Dr Wanda Andino     Thank you     Beth Valerio

## 2022-07-12 ENCOUNTER — OFFICE VISIT (OUTPATIENT)
Dept: OBGYN CLINIC | Facility: CLINIC | Age: 57
End: 2022-07-12
Payer: COMMERCIAL

## 2022-07-12 VITALS
WEIGHT: 123 LBS | DIASTOLIC BLOOD PRESSURE: 81 MMHG | HEART RATE: 72 BPM | RESPIRATION RATE: 16 BRPM | SYSTOLIC BLOOD PRESSURE: 119 MMHG | HEIGHT: 61 IN | BODY MASS INDEX: 23.22 KG/M2

## 2022-07-12 DIAGNOSIS — M65.311 TRIGGER THUMB OF RIGHT HAND: Primary | ICD-10-CM

## 2022-07-12 PROCEDURE — 99214 OFFICE O/P EST MOD 30 MIN: CPT | Performed by: PHYSICIAN ASSISTANT

## 2022-07-12 PROCEDURE — 20550 NJX 1 TENDON SHEATH/LIGAMENT: CPT | Performed by: PHYSICIAN ASSISTANT

## 2022-07-12 RX ORDER — DEXAMETHASONE SODIUM PHOSPHATE 100 MG/10ML
40 INJECTION INTRAMUSCULAR; INTRAVENOUS
Status: COMPLETED | OUTPATIENT
Start: 2022-07-12 | End: 2022-07-12

## 2022-07-12 RX ORDER — LIDOCAINE HYDROCHLORIDE 20 MG/ML
1 INJECTION, SOLUTION INFILTRATION; PERINEURAL
Status: COMPLETED | OUTPATIENT
Start: 2022-07-12 | End: 2022-07-12

## 2022-07-12 RX ADMIN — DEXAMETHASONE SODIUM PHOSPHATE 40 MG: 100 INJECTION INTRAMUSCULAR; INTRAVENOUS at 09:44

## 2022-07-12 RX ADMIN — LIDOCAINE HYDROCHLORIDE 1 ML: 20 INJECTION, SOLUTION INFILTRATION; PERINEURAL at 09:44

## 2022-07-12 NOTE — PROGRESS NOTES
ASSESSMENT/PLAN:      Right Trigger thumb  *Pt was offered and accepted CSI   *CSI was administered into the right thumb A1 pully without complications  *Pt was advised to apply heat for any residual discomfort or clicking and locking  *Pt was advised that only 2 CSI could be administered for this condition, and after we would have to consider trigger release if they are still having pain, clicking, locking  *Pt will follow up in the office in 6 weeks        Follow Up:  Return in about 6 weeks (around 8/23/2022) for with Dr Mary Bullock   To Do Next Visit:  Re-evaluation of current issue    General Discussions:  Trigger Finger: The anatomy and physiology of trigger finger was discussed with the patient today in the office  Edema and increased contact pressure within the flexor tendons at the A1 pulley can cause pain, crepitation, and triggering or locking of the digit resulting in limitation of function  Symptoms can occur at anytime but are typically worse in the morning or after a brief rest from repetitive activity  Treatment options include resting/nighttime MP blocking splints to decrease edema, oral anti-inflammatory medications, home or formal therapy exercises, up to 2 steroid injections within the tendon sheath, or surgical release  While majority of patients do respond to conservative treatment, up to 20% may require surgical release  CHIEF COMPLAINT:  Chief Complaint   Patient presents with    Right Thumb - Pain, Locking, Clicking       SUBJECTIVE:  Henri Dorman is a 62y o  year old female who presents right thumb clicking, locking, pain  Patient states it has been going on for about 3 weeks  She states most the pain is over the volar aspect of her thumb  She denies any injuries or trauma to the area  She states she will have clicking and locking when she tries to bend her thumb  It is painful  She denies any numbness or tingling    She does have a history of previous trigger fingers that required surgical intervention          PAST MEDICAL HISTORY:  Past Medical History:   Diagnosis Date    Headache     Pyelonephritis 3/25/2022       PAST SURGICAL HISTORY:  Past Surgical History:   Procedure Laterality Date    BREAST CYST EXCISION Right     benign       FAMILY HISTORY:  Family History   Problem Relation Age of Onset    Seizures Sister     Lupus Sister        SOCIAL HISTORY:  Social History     Tobacco Use    Smoking status: Current Every Day Smoker     Packs/day: 0 25     Years: 25 00     Pack years: 6 25     Types: Cigarettes    Smokeless tobacco: Never Used   Vaping Use    Vaping Use: Never used   Substance Use Topics    Alcohol use: Yes     Comment: occasionally    Drug use: No       MEDICATIONS:    Current Outpatient Medications:     PARoxetine (PAXIL-CR) 25 mg 24 hr tablet, TAKE 1 TABLET BY MOUTH EVERY MORNING, Disp: 90 tablet, Rfl: 3    famotidine (PEPCID) 20 mg tablet, Take 1 tablet (20 mg total) by mouth 2 (two) times a day (Patient not taking: No sig reported), Disp: 60 tablet, Rfl: 0    ALLERGIES:  Allergies   Allergen Reactions    Azithromycin Nausea Only    Iodine - Food Allergy Swelling and Edema     Reaction Date: 14Sep2011;     Zoloft [Sertraline]      Worsening of anxiety       REVIEW OF SYSTEMS:  Review of Systems  ROS:   General: no fever, no chills  HEENT:  No loss of hearing or eyesight problems  Eyes:  No red eyes  Respiratory:  No coughing, shortness of breath or wheezing  Cardiovascular:  No chest pain, no palpitations  GI:  Abdomen soft nontender, denies nausea  Endocrine:  No muscle weakness, no frequent urination, no excessive thirst  Urinary:  No dysuria, no incontinence  Musculoskeletal: see HPI and PE  SKIN:  No skin rash, no dry skin  Neurological:  No headaches, no confusion  Psychiatric:  No suicide thoughts, no anxiety, no depression  Review of all other systems is negative    VITALS:  Vitals:    07/12/22 0918   BP: 119/81   Pulse: 72   Resp: 16 LABS:  HgA1c: No results found for: HGBA1C  BMP:   Lab Results   Component Value Date    CALCIUM 9 3 05/16/2022     05/02/2017    K 4 9 05/16/2022    CO2 28 05/16/2022     05/16/2022    BUN 17 05/16/2022    CREATININE 0 69 05/16/2022       _____________________________________________________  PHYSICAL EXAMINATION:  General: well developed and well nourished, alert, oriented times 3 and appears comfortable  Psychiatric: Normal  HEENT: Trachea Midline, No torticollis  Pulmonary: No audible wheezing or strider  Cardiovascular: No discernable arrhythmia   Skin: No masses, erythema, lacerations, fluctation, ulcerations  Neurovascular: Sensation Intact to the Median, Ulnar, Radial Nerve, Motor Intact to the Median, Ulnar, Radial Nerve and Pulses Intact    MUSCULOSKELETAL EXAMINATION:  right thumb:  Positive palpable nodule over the A1 pulley  Positive tenderness to palpation over A1 pulley  Positive clicking  Positive catching        ___________________________________________________  STUDIES REVIEWED:  No studies reviewed  PROCEDURES PERFORMED:  Hand/upper extremity injection: R thumb A1  Universal Protocol:  Consent: Verbal consent obtained  Risks and benefits: risks, benefits and alternatives were discussed  Consent given by: patient  Time out: Immediately prior to procedure a "time out" was called to verify the correct patient, procedure, equipment, support staff and site/side marked as required    Patient understanding: patient states understanding of the procedure being performed  Patient consent: the patient's understanding of the procedure matches consent given  Site marked: the operative site was marked  Patient identity confirmed: verbally with patient    Supporting Documentation  Indications: pain   Procedure Details  Condition:trigger finger Location: thumb - R thumb A1   Preparation: Patient was prepped and draped in the usual sterile fashion  Needle size: 25 G  Approach: volar  Medications administered: 1 mL lidocaine 2 %; 40 mg dexamethasone 100 mg/10 mL    Patient tolerance: patient tolerated the procedure well with no immediate complications  Dressing:  Sterile dressing applied                      Portions of the record may have been created with voice recognition software  Occasional wrong word or "sound a like" substitutions may have occurred due to the inherent limitations of voice recognition software  Read the chart carefully and recognize, using context, where substitutions have occurred

## 2022-07-12 NOTE — LETTER
July 12, 2022     Patient: Chris Head  YOB: 1965  Date of Visit: 7/12/2022      To Whom it May Concern:    Hunter Sky is under my professional care  Aleksander Vargas was seen in my office on 7/12/2022  Aleksander Vargas has a right thumb trigger digit of which she was seen today for and was given a cortisone injection  She will follow up in 6 weeks with Dr Zoila Garcia for re-evaluation  If you have any questions or concerns, please don't hesitate to call  Sincerely,          Mendel Casanova PA-C        CC: No Recipients

## 2022-07-28 NOTE — TELEPHONE ENCOUNTER
Pt is calling stating that she was seen on 7-12 for her rt hand and is still in pain   Pt has an appt on 8-17 and would like to know if there is anything,even over the counter that she can take in the meantime    #[phone]

## 2022-08-29 ENCOUNTER — HOSPITAL ENCOUNTER (OUTPATIENT)
Dept: ULTRASOUND IMAGING | Facility: CLINIC | Age: 57
Discharge: HOME | End: 2022-08-29
Payer: COMMERCIAL

## 2022-08-29 ENCOUNTER — HOSPITAL ENCOUNTER (OUTPATIENT)
Dept: MAMMOGRAPHY | Facility: CLINIC | Age: 57
Discharge: HOME | End: 2022-08-29
Payer: COMMERCIAL

## 2022-08-29 VITALS — WEIGHT: 123 LBS | BODY MASS INDEX: 23.22 KG/M2 | HEIGHT: 61 IN

## 2022-08-29 DIAGNOSIS — R92.8 ABNORMAL MAMMOGRAM: ICD-10-CM

## 2022-08-29 PROCEDURE — 76642 ULTRASOUND BREAST LIMITED: CPT

## 2022-08-29 PROCEDURE — 77065 DX MAMMO INCL CAD UNI: CPT

## 2022-08-29 PROCEDURE — G0279 TOMOSYNTHESIS, MAMMO: HCPCS

## 2022-09-26 ENCOUNTER — OFFICE VISIT (OUTPATIENT)
Dept: GASTROENTEROLOGY | Facility: CLINIC | Age: 57
End: 2022-09-26
Payer: COMMERCIAL

## 2022-09-26 VITALS
OXYGEN SATURATION: 96 % | WEIGHT: 123.2 LBS | HEIGHT: 61 IN | HEART RATE: 72 BPM | SYSTOLIC BLOOD PRESSURE: 122 MMHG | BODY MASS INDEX: 23.26 KG/M2 | DIASTOLIC BLOOD PRESSURE: 90 MMHG | TEMPERATURE: 96.8 F

## 2022-09-26 DIAGNOSIS — K76.9 HEPATIC LESION: ICD-10-CM

## 2022-09-26 DIAGNOSIS — Z12.11 COLON CANCER SCREENING: Primary | ICD-10-CM

## 2022-09-26 DIAGNOSIS — R16.0 LIVER MASS: ICD-10-CM

## 2022-09-26 DIAGNOSIS — K59.00 CONSTIPATION, UNSPECIFIED CONSTIPATION TYPE: ICD-10-CM

## 2022-09-26 PROCEDURE — 99244 OFF/OP CNSLTJ NEW/EST MOD 40: CPT | Performed by: INTERNAL MEDICINE

## 2022-09-26 RX ORDER — POLYETHYLENE GLYCOL 3350, SODIUM CHLORIDE, SODIUM BICARBONATE, POTASSIUM CHLORIDE 420; 11.2; 5.72; 1.48 G/4L; G/4L; G/4L; G/4L
4000 POWDER, FOR SOLUTION ORAL ONCE
Qty: 4000 ML | Refills: 0 | Status: SHIPPED | OUTPATIENT
Start: 2022-09-26 | End: 2022-09-26

## 2022-09-26 NOTE — PATIENT INSTRUCTIONS
Have blood work done at your earliest convenience  Schedule MRI with eovist contrast to better characterize the liver mass

## 2022-09-27 ENCOUNTER — TELEPHONE (OUTPATIENT)
Dept: GASTROENTEROLOGY | Facility: CLINIC | Age: 57
End: 2022-09-27

## 2022-10-31 ENCOUNTER — TELEPHONE (OUTPATIENT)
Dept: GASTROENTEROLOGY | Facility: CLINIC | Age: 57
End: 2022-10-31

## 2022-10-31 NOTE — TELEPHONE ENCOUNTER
Pt canceled her 11/3 appt with Dr Radha Morgan  Patient requested a phone call back to reschedule her 11/3 appt with Dr Radha Morgan  [facility] to call so that we can assist her with rescheduling her appt

## 2022-12-06 DIAGNOSIS — F41.8 DEPRESSION WITH ANXIETY: ICD-10-CM

## 2022-12-06 RX ORDER — PAROXETINE HYDROCHLORIDE HEMIHYDRATE 25 MG/1
TABLET, FILM COATED, EXTENDED RELEASE ORAL
Qty: 90 TABLET | Refills: 3 | Status: SHIPPED | OUTPATIENT
Start: 2022-12-06

## 2023-05-15 ENCOUNTER — OFFICE VISIT (OUTPATIENT)
Dept: FAMILY MEDICINE CLINIC | Facility: CLINIC | Age: 58
End: 2023-05-15

## 2023-05-15 VITALS
TEMPERATURE: 98 F | OXYGEN SATURATION: 97 % | HEART RATE: 79 BPM | HEIGHT: 61 IN | SYSTOLIC BLOOD PRESSURE: 109 MMHG | DIASTOLIC BLOOD PRESSURE: 66 MMHG | BODY MASS INDEX: 23.28 KG/M2 | RESPIRATION RATE: 16 BRPM

## 2023-05-15 DIAGNOSIS — B00.1 COLD SORE: Primary | ICD-10-CM

## 2023-05-15 PROBLEM — R68.83 CHILLS: Status: RESOLVED | Noted: 2022-03-14 | Resolved: 2023-05-15

## 2023-05-15 RX ORDER — CEPHALEXIN 500 MG/1
500 CAPSULE ORAL 3 TIMES DAILY
Qty: 21 CAPSULE | Refills: 0 | Status: SHIPPED | OUTPATIENT
Start: 2023-05-15 | End: 2023-05-22

## 2023-05-15 RX ORDER — CICLOPIROX 7.7 MG/G
GEL TOPICAL
COMMUNITY
Start: 2023-05-01

## 2023-07-01 NOTE — ADDENDUM NOTE
Addended by: Silviano Montoya on: 8/17/2020 01:33 PM     Modules accepted: Orders Onset: this morning  Location / description: states bright red blood after a soft stool this morning. No hemorrhoids known. reports has stopped    Precipitating Factors: no cp/sob/dizziness    Pain Scale (1-10), 10 highest: 0/10    What improves/worsens symptoms: na    Symptom specific medications: na    LMP : No LMP recorded. Patient has had a hysterectomy.        PLAN:    See Provider within next few days     attempted to set up appt & unable. Recommended appointment in [facility] christine or call Monday morning    Patient/Caller agrees to follow recommendations.    Reason for Disposition  • MILD rectal bleeding (more than just a few drops or streaks)    Protocols used: RECTAL BLEEDING-A-AH

## 2023-11-22 ENCOUNTER — OFFICE VISIT (OUTPATIENT)
Dept: FAMILY MEDICINE CLINIC | Facility: CLINIC | Age: 58
End: 2023-11-22
Payer: COMMERCIAL

## 2023-11-22 VITALS
RESPIRATION RATE: 18 BRPM | HEART RATE: 80 BPM | OXYGEN SATURATION: 98 % | SYSTOLIC BLOOD PRESSURE: 124 MMHG | BODY MASS INDEX: 23.11 KG/M2 | WEIGHT: 122.4 LBS | HEIGHT: 61 IN | TEMPERATURE: 98.2 F | DIASTOLIC BLOOD PRESSURE: 80 MMHG

## 2023-11-22 DIAGNOSIS — R53.83 OTHER FATIGUE: ICD-10-CM

## 2023-11-22 DIAGNOSIS — Z12.31 ENCOUNTER FOR SCREENING MAMMOGRAM FOR BREAST CANCER: ICD-10-CM

## 2023-11-22 DIAGNOSIS — F41.8 DEPRESSION WITH ANXIETY: ICD-10-CM

## 2023-11-22 DIAGNOSIS — E78.5 DYSLIPIDEMIA: ICD-10-CM

## 2023-11-22 DIAGNOSIS — E55.9 VITAMIN D DEFICIENCY: ICD-10-CM

## 2023-11-22 DIAGNOSIS — Z00.00 ENCOUNTER FOR WELLNESS EXAMINATION IN ADULT: Primary | ICD-10-CM

## 2023-11-22 PROCEDURE — 99396 PREV VISIT EST AGE 40-64: CPT | Performed by: FAMILY MEDICINE

## 2023-11-22 RX ORDER — BUSPIRONE HYDROCHLORIDE 5 MG/1
TABLET ORAL
Qty: 60 TABLET | Refills: 1 | Status: SHIPPED | OUTPATIENT
Start: 2023-11-22

## 2023-11-22 RX ORDER — MELATONIN
1000 DAILY
COMMUNITY

## 2023-11-22 RX ORDER — TRAZODONE HYDROCHLORIDE 50 MG/1
TABLET ORAL
Qty: 60 TABLET | Refills: 1 | Status: SHIPPED | OUTPATIENT
Start: 2023-11-22

## 2023-11-22 RX ORDER — PAROXETINE HYDROCHLORIDE HEMIHYDRATE 25 MG/1
25 TABLET, FILM COATED, EXTENDED RELEASE ORAL EVERY MORNING
Qty: 90 TABLET | Refills: 3 | Status: SHIPPED | OUTPATIENT
Start: 2023-11-22

## 2023-11-22 RX ORDER — CRANBERRY FRUIT EXTRACT 250 MG
CAPSULE ORAL
COMMUNITY

## 2023-11-22 RX ORDER — VITAMIN B COMPLEX
1 CAPSULE ORAL DAILY
COMMUNITY

## 2023-11-22 NOTE — PATIENT INSTRUCTIONS
Please schedule MRI of liver towards end December  Please proceed with colonoscopy  You are due for screening mammogram  Blood work  Glass a warm water in the morning on an empty stomach  IBgard, peppermint oil capsules are helpful for IBS. Dena.   Papaya extract  FODMAP diet for flares

## 2024-01-02 ENCOUNTER — OFFICE VISIT (OUTPATIENT)
Dept: FAMILY MEDICINE CLINIC | Facility: CLINIC | Age: 59
End: 2024-01-02
Payer: COMMERCIAL

## 2024-01-02 VITALS
DIASTOLIC BLOOD PRESSURE: 78 MMHG | WEIGHT: 125.2 LBS | HEIGHT: 61 IN | HEART RATE: 75 BPM | OXYGEN SATURATION: 95 % | RESPIRATION RATE: 16 BRPM | TEMPERATURE: 98 F | SYSTOLIC BLOOD PRESSURE: 128 MMHG | BODY MASS INDEX: 23.64 KG/M2

## 2024-01-02 DIAGNOSIS — J20.9 ACUTE BRONCHITIS WITH BRONCHOSPASM: Primary | ICD-10-CM

## 2024-01-02 PROCEDURE — 99213 OFFICE O/P EST LOW 20 MIN: CPT | Performed by: FAMILY MEDICINE

## 2024-01-02 RX ORDER — DOXYCYCLINE HYCLATE 100 MG/1
100 CAPSULE ORAL
Qty: 14 CAPSULE | Refills: 0 | Status: SHIPPED | OUTPATIENT
Start: 2024-01-02 | End: 2024-01-09

## 2024-01-02 RX ORDER — ALBUTEROL SULFATE 90 UG/1
2 AEROSOL, METERED RESPIRATORY (INHALATION) EVERY 4 HOURS PRN
Qty: 18 G | Refills: 1 | Status: SHIPPED | OUTPATIENT
Start: 2024-01-02

## 2024-01-02 RX ORDER — METHYLPREDNISOLONE 4 MG/1
TABLET ORAL
Qty: 21 EACH | Refills: 0 | Status: SHIPPED | OUTPATIENT
Start: 2024-01-02

## 2024-01-02 RX ORDER — CODEINE PHOSPHATE/GUAIFENESIN 10-100MG/5
LIQUID (ML) ORAL
Qty: 180 ML | Refills: 0 | Status: SHIPPED | OUTPATIENT
Start: 2024-01-02

## 2024-01-02 NOTE — PROGRESS NOTES
Name: Gaby Bowling      : 1965      MRN: [id number]  Encounter Provider: Cathy Khan MD  Encounter Date: 2024   Encounter department: [facility]    Assessment & Plan     1. Acute bronchitis with bronchospasm  -     methylPREDNISolone 4 MG tablet therapy pack; Use as directed on package  -     doxycycline hyclate (VIBRAMYCIN) 100 mg capsule; Take 1 capsule (100 mg total) by mouth 2 (two) times daily after meals for 7 days  -     albuterol (PROVENTIL HFA,VENTOLIN HFA) 90 mcg/act inhaler; Inhale 2 puffs every 4 (four) hours as needed for wheezing or shortness of breath (cough)  -     guaifenesin-codeine (GUAIFENESIN AC) 100-10 MG/5ML liquid; Take 10 mL every 4-6 hours as needed for cough    Patient presents for evaluation of acute bronchitis with bronchospasm.  She will start Rx regimen as per orders.  I recommend Mucinex OTC.  Patient will contact me if symptoms are not improving       Subjective     Cold symptoms  since    Feels congested, afebrile.  Cough and wheezing - worse at night   Used  left over of [place] with codeine   Mucus is brown and green in color  Patient tried Advil for sinus pressure and headache.   Patient is a smoker, she is concerned about possible bronchitis.    URI   Associated symptoms include congestion, coughing, headaches (sinus) and wheezing.     Review of Systems   Constitutional: Negative.    HENT:  Positive for congestion and sinus pressure.    Eyes: Negative.    Respiratory:  Positive for cough, shortness of breath and wheezing.    Cardiovascular: Negative.    Gastrointestinal: Negative.    Genitourinary: Negative.    Neurological:  Positive for headaches (sinus).   Psychiatric/Behavioral: Negative.         Past Medical History:   Diagnosis Date   • Headache    • Pyelonephritis 3/25/2022     Past Surgical History:   Procedure Laterality Date   • BREAST CYST EXCISION Right     benign   • TRIGGER FINGER RELEASE Right 2023     Family  History   Problem Relation Age of Onset   • Seizures Sister    • Lupus Sister      Social History     Socioeconomic History   • Marital status:      Spouse name: None   • Number of children: None   • Years of education: None   • Highest education level: None   Occupational History     Employer: [employer]   Tobacco Use   • Smoking status: Every Day     Current packs/day: 0.25     Average packs/day: 0.3 packs/day for 25.0 years (6.3 ttl pk-yrs)     Types: Cigarettes   • Smokeless tobacco: Never   Vaping Use   • Vaping status: Never Used   Substance and Sexual Activity   • Alcohol use: Yes     Comment: occasionally   • Drug use: No   • Sexual activity: None   Other Topics Concern   • None   Social History Narrative   • None     Social Determinants of Health     Financial Resource Strain: Not on file   Food Insecurity: Not on file   Transportation Needs: Not on file   Physical Activity: Not on file   Stress: Not on file   Social Connections: Not on file   Intimate Partner Violence: Not on file   Housing Stability: Not on file     Current Outpatient Medications on File Prior to Visit   Medication Sig   • b complex vitamins capsule Take 1 capsule by mouth daily   • cholecalciferol (VITAMIN D3) 1,000 units tablet Take 1,000 Units by mouth daily   • Ciclopirox 0.77 % gel    • Cranberry 250 MG CAPS Take by mouth   • PARoxetine (PAXIL-CR) 25 mg 24 hr tablet Take 1 tablet (25 mg total) by mouth every morning   • busPIRone (BUSPAR) 5 mg tablet 1 tablet twice a day as needed for anxiety, if needed dose could be increased up to 2 tablets twice a day (Patient not taking: Reported on 1/2/2024)   • traZODone (DESYREL) 50 mg tablet Take 1 tablet at bedtime, if needed dose could be increased up to 2 tablets at bedtime (Patient not taking: Reported on 1/2/2024)     Allergies   Allergen Reactions   • Azithromycin Nausea Only   • Iodine - Food Allergy Swelling and Edema     Reaction Date: 14Sep2011;    • Zoloft [Sertraline]       "Worsening of anxiety     Immunization History   Administered Date(s) Administered   • INFLUENZA 11/01/2018   • Influenza, recombinant, quadrivalent,injectable, preservative free 12/10/2019   • Influenza, seasonal, injectable, preservative free 11/01/2018       Objective     /78 (BP Location: Left arm, Patient Position: Sitting, Cuff Size: Standard)   Pulse 75   Temp 98 °F (36.7 °C) (Temporal)   Resp 16   Ht 5' 1\" (1.549 m)   Wt 56.8 kg (125 lb 3.2 oz)   SpO2 95%   BMI 23.66 kg/m²     Physical Exam  Vitals and nursing note reviewed.   Constitutional:       General: She is not in acute distress.     Appearance: Normal appearance. She is well-developed. She is not ill-appearing.   HENT:      Head: Normocephalic and atraumatic.      Right Ear: Tympanic membrane normal.      Left Ear: Tympanic membrane normal.      Nose: Mucosal edema and congestion present.      Mouth/Throat:      Mouth: Mucous membranes are moist.   Eyes:      Conjunctiva/sclera: Conjunctivae normal.   Cardiovascular:      Rate and Rhythm: Normal rate and regular rhythm.      Heart sounds: Normal heart sounds. No murmur heard.  Pulmonary:      Effort: Pulmonary effort is normal. Prolonged expiration present. No respiratory distress.      Breath sounds: Normal breath sounds. No rales.      Comments: Few end expiratory wheezes and rhonchi at bases  Musculoskeletal:      Cervical back: Neck supple.   Neurological:      Mental Status: She is alert and oriented to person, place, and time.      Cranial Nerves: No cranial nerve deficit.      Deep Tendon Reflexes: Reflexes are normal and symmetric.   Psychiatric:         Mood and Affect: Mood normal.         Behavior: Behavior normal.         Thought Content: Thought content normal.       Cathy Khan MD    "

## 2024-01-08 ENCOUNTER — TELEPHONE (OUTPATIENT)
Dept: FAMILY MEDICINE CLINIC | Facility: CLINIC | Age: 59
End: 2024-01-08

## 2024-01-08 DIAGNOSIS — J20.9 ACUTE BRONCHITIS WITH BRONCHOSPASM: Primary | ICD-10-CM

## 2024-01-08 RX ORDER — CEFUROXIME AXETIL 500 MG/1
500 TABLET ORAL 2 TIMES DAILY WITH MEALS
Qty: 14 TABLET | Refills: 0 | Status: SHIPPED | OUTPATIENT
Start: 2024-01-08 | End: 2024-01-15

## 2024-01-08 NOTE — TELEPHONE ENCOUNTER
Called patient and leave a detailed message on response on her message. [employer] message created and sent

## 2024-01-08 NOTE — TELEPHONE ENCOUNTER
Patient called to state she is still dealing with cough and head congestion and wondering if an alternative antibiotic could be prescribed. She states she is feeling worse, not better, and on last day of dosepak. Please advise.

## 2024-02-21 PROBLEM — Z00.00 ENCOUNTER FOR WELLNESS EXAMINATION IN ADULT: Status: RESOLVED | Noted: 2021-08-08 | Resolved: 2024-02-21

## 2024-09-12 ENCOUNTER — OFFICE VISIT (OUTPATIENT)
Dept: FAMILY MEDICINE CLINIC | Facility: CLINIC | Age: 59
End: 2024-09-12
Payer: COMMERCIAL

## 2024-09-12 VITALS
OXYGEN SATURATION: 97 % | RESPIRATION RATE: 16 BRPM | BODY MASS INDEX: 24.02 KG/M2 | DIASTOLIC BLOOD PRESSURE: 82 MMHG | HEIGHT: 61 IN | HEART RATE: 71 BPM | TEMPERATURE: 97.6 F | SYSTOLIC BLOOD PRESSURE: 122 MMHG | WEIGHT: 127.2 LBS

## 2024-09-12 DIAGNOSIS — Z12.2 SCREENING FOR LUNG CANCER: ICD-10-CM

## 2024-09-12 DIAGNOSIS — F41.8 DEPRESSION WITH ANXIETY: ICD-10-CM

## 2024-09-12 DIAGNOSIS — K76.9 HEPATIC LESION: ICD-10-CM

## 2024-09-12 DIAGNOSIS — Z72.0 TOBACCO USE: ICD-10-CM

## 2024-09-12 DIAGNOSIS — E78.5 DYSLIPIDEMIA: ICD-10-CM

## 2024-09-12 DIAGNOSIS — Z12.11 COLON CANCER SCREENING: ICD-10-CM

## 2024-09-12 DIAGNOSIS — E55.9 VITAMIN D DEFICIENCY: ICD-10-CM

## 2024-09-12 DIAGNOSIS — Z12.31 ENCOUNTER FOR SCREENING MAMMOGRAM FOR BREAST CANCER: ICD-10-CM

## 2024-09-12 DIAGNOSIS — Z00.00 ENCOUNTER FOR WELLNESS EXAMINATION IN ADULT: Primary | ICD-10-CM

## 2024-09-12 DIAGNOSIS — L85.3 DRY SKIN DERMATITIS: ICD-10-CM

## 2024-09-12 PROBLEM — R91.1 PULMONARY NODULE: Status: RESOLVED | Noted: 2022-05-03 | Resolved: 2024-09-12

## 2024-09-12 PROCEDURE — 99396 PREV VISIT EST AGE 40-64: CPT | Performed by: FAMILY MEDICINE

## 2024-09-12 RX ORDER — CICLOPIROX OLAMINE 7.7 MG/G
CREAM TOPICAL 2 TIMES DAILY
COMMUNITY
End: 2024-09-12

## 2024-09-12 RX ORDER — PAROXETINE HYDROCHLORIDE HEMIHYDRATE 25 MG/1
25 TABLET, FILM COATED, EXTENDED RELEASE ORAL EVERY MORNING
Qty: 100 TABLET | Refills: 3 | Status: SHIPPED | OUTPATIENT
Start: 2024-09-12

## 2024-09-12 RX ORDER — TRETINOIN 0.5 MG/G
CREAM TOPICAL
COMMUNITY

## 2024-09-12 NOTE — ASSESSMENT & PLAN NOTE
1.5 cm hepatic lesion noted on CT abdomen and pelvis March 2022 and then followed with MRI of abdomen in May of 2022.   Patient was eval by [facility] GI/hepatology, Dr. Hyman.  He recommended follow-up MRI ,patient has not proceeded.  I strongly vies her to schedule  Orders:    MRI abdomen w wo contrast; Future

## 2024-09-12 NOTE — ASSESSMENT & PLAN NOTE
Symptoms are well-controlled, continue Paxil CR 25 mg daily    Orders:    PARoxetine (PAXIL-CR) 25 mg 24 hr tablet; Take 1 tablet (25 mg total) by mouth every morning

## 2024-09-12 NOTE — PROGRESS NOTES
Ambulatory Visit  Name: Gaby Bowling      : 1965      MRN: [id number]  Encounter Provider: Cathy Khan MD  Encounter Date: 2024   Encounter department: [facility]    Assessment & Plan  Encounter for wellness examination in adult  Patient is past due health screenings.  I strongly advised her to proceed       Dyslipidemia    Orders:    CBC and differential; Future    Comprehensive metabolic panel; Future    Lipid Panel with Direct LDL reflex; Future    TSH, 3rd generation; Future    Tobacco use  The patient is motivated to quit       Vitamin D deficiency    Orders:    Vitamin D 25 hydroxy; Future    Encounter for screening mammogram for breast cancer    Orders:    Mammo screening bilateral w 3d and cad; Future    Colon cancer screening    Orders:    Ambulatory referral to Gastroenterology; Future    Hepatic lesion  1.5 cm hepatic lesion noted on CT abdomen and pelvis 2022 and then followed with MRI of abdomen in May of 2022.   Patient was eval by [facility] GI/hepatology, Dr. Hyman.  He recommended follow-up MRI ,patient has not proceeded.  I strongly vies her to schedule  Orders:    MRI abdomen w wo contrast; Future    Depression with anxiety  Symptoms are well-controlled, continue Paxil CR 25 mg daily    Orders:    PARoxetine (PAXIL-CR) 25 mg 24 hr tablet; Take 1 tablet (25 mg total) by mouth every morning    Screening for lung cancer  I discussed with her that she is a candidate for lung cancer CT screening.     The following Shared Decision-Making points were covered:  Benefits of screening were discussed, including the rates of reduction in death from lung cancer and other causes.  Harms of screening were reviewed, including false positive tests, radiation exposure levels, risks of invasive procedures, risks of complications of screening, and risk of overdiagnosis.  I counseled on the importance of adherence to annual lung cancer LDCT screening, impact of  co-morbidities, and ability or willingness to undergo diagnosis and treatment.  I counseled on the importance of maintaining abstinence as a former smoker or was counseled on the importance of smoking cessation if a current smoker    Review of Eligibility Criteria: She meets all of the criteria for Lung Cancer Screening.   She is 59 y.o.   She has 20 pack year tobacco history and is a current smoker or has quit within the past 15 years  She presents no signs or symptoms of lung cancer    After discussion, the patient decided to elect lung cancer screening.    Orders:    CT lung screening program; Future    Dry skin dermatitis    Orders:    Ammonium Lactate 10 % CREA; Apply daily         History of Present Illness     Annual well exam.  Patient has been feeling generally well.    She still smokes, less than half a pack daily, often does not smoke a whole cigarette.  She is willing to quit, but is not ready to do it now.  The patient offers no complaints today.  She is past due mammogram, colonoscopy and blood work.  She has not followed up with [facility] regarding hepatic lesion that was incidentally discovered on CT in 2022 and subsequently followed with liver MRI.  Patient was supposed to schedule follow-up liver MRI but did not proceed.    She reports that symptoms of anxiety depression have been stably controlled on Paxil CR 25 mg daily.    Patient is complaining of very dry skin on her feet.  She follows with a podiatrist, uses OTC moisturizers with no significant improvement.      Review of Systems   Constitutional: Negative.    HENT: Negative.     Eyes: Negative.    Respiratory: Negative.     Cardiovascular: Negative.    Endocrine: Negative.    Genitourinary: Negative.    Musculoskeletal:  Positive for arthralgias.   Skin:  Negative for rash.        Dry skin   Allergic/Immunologic: Negative.    Neurological: Negative.    Hematological: Negative.    Psychiatric/Behavioral: Negative.       Past Medical  History:   Diagnosis Date    Headache     Pyelonephritis 3/25/2022     Past Surgical History:   Procedure Laterality Date    BREAST CYST EXCISION Right     benign    SKIN CANCER EXCISION      8/22/24    TRIGGER FINGER RELEASE Right 02/13/2023     Family History   Problem Relation Age of Onset    Seizures Sister     Lupus Sister      Social History     Tobacco Use    Smoking status: Every Day     Current packs/day: 0.25     Average packs/day: 0.3 packs/day for 25.0 years (6.3 ttl pk-yrs)     Types: Cigarettes    Smokeless tobacco: Never   Vaping Use    Vaping status: Never Used   Substance and Sexual Activity    Alcohol use: Yes     Comment: occasionally    Drug use: No    Sexual activity: Not on file     Current Outpatient Medications on File Prior to Visit   Medication Sig    ciclopirox (LOPROX) 0.77 % cream Apply topically 2 (two) times a day    PARoxetine (PAXIL-CR) 25 mg 24 hr tablet Take 1 tablet (25 mg total) by mouth every morning    tretinoin (RETIN-A) 0.05 % cream Apply topically daily at bedtime    albuterol (PROVENTIL HFA,VENTOLIN HFA) 90 mcg/act inhaler Inhale 2 puffs every 4 (four) hours as needed for wheezing or shortness of breath (cough) (Patient not taking: Reported on 9/12/2024)    b complex vitamins capsule Take 1 capsule by mouth daily (Patient not taking: Reported on 9/12/2024)    busPIRone (BUSPAR) 5 mg tablet 1 tablet twice a day as needed for anxiety, if needed dose could be increased up to 2 tablets twice a day (Patient not taking: Reported on 1/2/2024)    cholecalciferol (VITAMIN D3) 1,000 units tablet Take 1,000 Units by mouth daily (Patient not taking: Reported on 9/12/2024)    Ciclopirox 0.77 % gel  (Patient not taking: Reported on 9/12/2024)    Cranberry 250 MG CAPS Take by mouth (Patient not taking: Reported on 9/12/2024)    guaifenesin-codeine (GUAIFENESIN AC) 100-10 MG/5ML liquid Take 10 mL every 4-6 hours as needed for cough (Patient not taking: Reported on 9/12/2024)     "methylPREDNISolone 4 MG tablet therapy pack Use as directed on package (Patient not taking: Reported on 9/12/2024)    traZODone (DESYREL) 50 mg tablet Take 1 tablet at bedtime, if needed dose could be increased up to 2 tablets at bedtime (Patient not taking: Reported on 1/2/2024)     Allergies   Allergen Reactions    Azithromycin Nausea Only    Iodine - Food Allergy Swelling and Edema     Reaction Date: 14Sep2011;     Zoloft [Sertraline]      Worsening of anxiety     Immunization History   Administered Date(s) Administered    INFLUENZA 11/01/2018    Influenza, recombinant, quadrivalent,injectable, preservative free 12/10/2019    Influenza, seasonal, injectable, preservative free 11/01/2018     Objective     /82 (BP Location: Left arm, Patient Position: Sitting, Cuff Size: Standard)   Pulse 71   Temp 97.6 °F (36.4 °C) (Temporal)   Resp 16   Ht 5' 1\" (1.549 m)   Wt 57.7 kg (127 lb 3.2 oz)   SpO2 97%   BMI 24.03 kg/m²     Physical Exam  Vitals and nursing note reviewed.   Constitutional:       General: She is not in acute distress.     Appearance: Normal appearance. She is well-developed. She is not ill-appearing.   HENT:      Head: Normocephalic and atraumatic.   Eyes:      Conjunctiva/sclera: Conjunctivae normal.   Neck:      Thyroid: No thyromegaly.      Vascular: No carotid bruit.   Cardiovascular:      Rate and Rhythm: Normal rate and regular rhythm.      Heart sounds: Normal heart sounds. No murmur heard.  Pulmonary:      Effort: Pulmonary effort is normal. No respiratory distress.      Breath sounds: Normal breath sounds. No wheezing.   Abdominal:      General: Bowel sounds are normal. There is no distension or abdominal bruit.      Palpations: Abdomen is soft.      Tenderness: There is no abdominal tenderness.      Hernia: No hernia is present.   Musculoskeletal:         General: Normal range of motion.      Cervical back: Neck supple.      Right lower leg: No edema.      Left lower leg: No edema. "   Skin:     General: Skin is warm.      Comments: Skin xerosis bilateral feet   Neurological:      General: No focal deficit present.      Mental Status: She is alert and oriented to person, place, and time.      Cranial Nerves: No cranial nerve deficit.      Coordination: Coordination normal.   Psychiatric:         Mood and Affect: Mood normal.         Behavior: Behavior normal.         Thought Content: Thought content normal.

## 2024-09-12 NOTE — PATIENT INSTRUCTIONS
12-hour fasting blood work  Mammogram, [phone]  Please schedule colonoscopy  Order for liver MRI placed, please schedule  Screening lung CT

## 2024-09-12 NOTE — ASSESSMENT & PLAN NOTE
Orders:    CBC and differential; Future    Comprehensive metabolic panel; Future    Lipid Panel with Direct LDL reflex; Future    TSH, 3rd generation; Future

## 2024-10-02 NOTE — PROGRESS NOTES
Call to patient in reference to her mammogram performed at [facility] on October 1, 2024.  Instructed patient that the radiologist has recommended some additional breast imaging, in order to make a final determination/result. A  from [facility] will contact her to schedule the additional imaging study/studies. Verbalizes understanding and is agreeable to proceed.    Daily Note     Today's date: 2020  Patient name: Shay Paez  : 1965  MRN: [id number]  Referring provider: Nahum Swift PA-C  Dx:   Encounter Diagnosis     ICD-10-CM    1  Trigger finger, right middle finger M65 331                   Subjective: "It feels OK"      Objective: See treatment diary below      Assessment: 30 min late (work related)  Doing well, extension/weight bearing better tolerated        Plan: Continue per plan of care             Manuals 7/21 7/28 7/30 6/26 6/30 7/2 7/7 7/10 7/14  7/16   Scar massage 8' 8' 8 5 8' 8' 8' 8' 8'  8'   Intrinsic stretch                                                                      Neuro Re-Ed                       Weight bearing on table  on wall, one hand 30 seconds, 3x  on wall, one hand 30 seconds, 3x   on wall, one hand 30 seconds, 3x     through wedge, 30 seconds, 3x through wedge, 30 seconds, 3x through wedge, 30 seconds, 3x                                                                                                                                                       Ther Ex                       ROM as tolerated 3'      3' 3' 3' 3' 5'     Place and holds 5x                     keypegs       1x with 1 marble             Wall walking  Red ball 8x  2# x 10 2# x 10 1# x 10   1# 10x Red ball 8x     Red ball 8x  Red ball 8x   velcro pinch board                     Digit extension   Extension web orange 3x15 Extension web orange 3x15 Extension web orange 3x15 lrg rb onn Tb 30x Extension web yellow 3x10 Extension web yellow 3x10 Extension web yellow 2x10, orange 2x10  Extension web yellow 2x10, orange 2x10  Extension web orange 3x15  Extension web orange 3x15   Intrinsic roll                      Pinch [place]       RG               Digiflex  green isolated 30x, blue composite 30x  green isolated 30x, blue composite 30x  green isolated 30x, blue composite 30x   Red 30x Red 30x Red isolated 30x, green composite 30x Red isolated 30x, green composite 30x green isolated 30x, blue composite 30x  green isolated 30x, blue composite 30x   Reverse blocking                   into orange putty 2x10   Ther Activity                      Grasping     G 3x 10 Green 30x    Green 30x  Green 30x       WF/WE/S/P       RPB 3 x 10       GFB 3x10       Jar turning   Cooper 10/10         Cooper 10/10 Cooper 10/10 Orange 10/10 Orange 10/10 Orange 10/10   Pinch + grasp  Green in, out with 5th   Green in, out with 5th   Green in, out with 5th  R CP + W 4th R/G CP + W 4th R/G CP + W 4th R/G CP + W 4th G CP + W 4th Green in, out with 5th  Green in, out with 5th    Dough press  in orange putty 2x10   in orange putty 2x10  in orange putty 2x10         In orange putty 2x10  In orange putty 2x10   in orange putty 2x10                                                    Modalities                       MHP 5' 5' 10 15 5' 10' 5' 5' 5'  5'

## 2024-10-29 LAB
25(OH)D3 SERPL-MCNC: 24 NG/ML (ref 30–100)
ALBUMIN SERPL-MCNC: 4.3 G/DL (ref 3.6–5.1)
ALBUMIN/GLOB SERPL: 2 (CALC) (ref 1–2.5)
ALP SERPL-CCNC: 67 U/L (ref 37–153)
ALT SERPL-CCNC: 12 U/L (ref 6–29)
AST SERPL-CCNC: 15 U/L (ref 10–35)
BASOPHILS # BLD AUTO: 48 CELLS/UL (ref 0–200)
BASOPHILS NFR BLD AUTO: 0.7 %
BILIRUB SERPL-MCNC: 0.3 MG/DL (ref 0.2–1.2)
BUN SERPL-MCNC: 21 MG/DL (ref 7–25)
BUN/CREAT SERPL: ABNORMAL (CALC) (ref 6–22)
CALCIUM SERPL-MCNC: 9.3 MG/DL (ref 8.6–10.4)
CHLORIDE SERPL-SCNC: 106 MMOL/L (ref 98–110)
CHOLEST SERPL-MCNC: 206 MG/DL
CHOLEST/HDLC SERPL: 3.5 (CALC)
CO2 SERPL-SCNC: 28 MMOL/L (ref 20–32)
CREAT SERPL-MCNC: 0.68 MG/DL (ref 0.5–1.03)
EOSINOPHIL # BLD AUTO: 211 CELLS/UL (ref 15–500)
EOSINOPHIL NFR BLD AUTO: 3.1 %
ERYTHROCYTE [DISTWIDTH] IN BLOOD BY AUTOMATED COUNT: 12.8 % (ref 11–15)
GFR/BSA.PRED SERPLBLD CYS-BASED-ARV: 100 ML/MIN/1.73M2
GLOBULIN SER CALC-MCNC: 2.1 G/DL (CALC) (ref 1.9–3.7)
GLUCOSE SERPL-MCNC: 104 MG/DL (ref 65–99)
HCT VFR BLD AUTO: 40.6 % (ref 35–45)
HDLC SERPL-MCNC: 59 MG/DL
HGB BLD-MCNC: 13.3 G/DL (ref 11.7–15.5)
LDLC SERPL CALC-MCNC: 127 MG/DL (CALC)
LYMPHOCYTES # BLD AUTO: 1972 CELLS/UL (ref 850–3900)
LYMPHOCYTES NFR BLD AUTO: 29 %
MCH RBC QN AUTO: 32 PG (ref 27–33)
MCHC RBC AUTO-ENTMCNC: 32.8 G/DL (ref 32–36)
MCV RBC AUTO: 97.8 FL (ref 80–100)
MONOCYTES # BLD AUTO: 728 CELLS/UL (ref 200–950)
MONOCYTES NFR BLD AUTO: 10.7 %
NEUTROPHILS # BLD AUTO: 3842 CELLS/UL (ref 1500–7800)
NEUTROPHILS NFR BLD AUTO: 56.5 %
NONHDLC SERPL-MCNC: 147 MG/DL (CALC)
PLATELET # BLD AUTO: 272 THOUSAND/UL (ref 140–400)
PMV BLD REES-ECKER: 10 FL (ref 7.5–12.5)
POTASSIUM SERPL-SCNC: 5.2 MMOL/L (ref 3.5–5.3)
PROT SERPL-MCNC: 6.4 G/DL (ref 6.1–8.1)
RBC # BLD AUTO: 4.15 MILLION/UL (ref 3.8–5.1)
SODIUM SERPL-SCNC: 139 MMOL/L (ref 135–146)
TRIGL SERPL-MCNC: 94 MG/DL
TSH SERPL-ACNC: 2.26 MIU/L (ref 0.4–4.5)
WBC # BLD AUTO: 6.8 THOUSAND/UL (ref 3.8–10.8)

## 2024-10-31 ENCOUNTER — TELEPHONE (OUTPATIENT)
Dept: FAMILY MEDICINE CLINIC | Facility: CLINIC | Age: 59
End: 2024-10-31

## 2024-10-31 NOTE — TELEPHONE ENCOUNTER
----- Message from HARESH Aranda sent at 10/30/2024  5:11 PM EDT -----  Please contact Gaby with results of blood work.   Vitamin d level is low, please take 2000 units of vitamin D3 daily.   All other blood work is stable.

## 2025-02-24 ENCOUNTER — OFFICE VISIT (OUTPATIENT)
Dept: FAMILY MEDICINE CLINIC | Facility: CLINIC | Age: 60
End: 2025-02-24
Payer: COMMERCIAL

## 2025-02-24 VITALS
RESPIRATION RATE: 16 BRPM | WEIGHT: 127.4 LBS | OXYGEN SATURATION: 95 % | TEMPERATURE: 97.8 F | BODY MASS INDEX: 24.05 KG/M2 | HEIGHT: 61 IN | DIASTOLIC BLOOD PRESSURE: 80 MMHG | HEART RATE: 71 BPM | SYSTOLIC BLOOD PRESSURE: 122 MMHG

## 2025-02-24 DIAGNOSIS — M54.2 CHRONIC NECK PAIN: ICD-10-CM

## 2025-02-24 DIAGNOSIS — G89.29 CHRONIC NECK PAIN: ICD-10-CM

## 2025-02-24 DIAGNOSIS — G44.52 NEW PERSISTENT DAILY HEADACHE: Primary | ICD-10-CM

## 2025-02-24 DIAGNOSIS — Z87.81 HISTORY OF TRAUMATIC FRACTURE OF VERTEBRA: ICD-10-CM

## 2025-02-24 DIAGNOSIS — M79.605 PAIN OF LEFT LOWER EXTREMITY: ICD-10-CM

## 2025-02-24 PROCEDURE — 99214 OFFICE O/P EST MOD 30 MIN: CPT | Performed by: FAMILY MEDICINE

## 2025-02-24 RX ORDER — AMOXICILLIN AND CLAVULANATE POTASSIUM 500; 125 MG/1; MG/1
TABLET, FILM COATED ORAL
COMMUNITY
Start: 2025-02-24 | End: 2025-02-24

## 2025-02-24 NOTE — PATIENT INSTRUCTIONS
If it anytime you headaches worsen we can prescribe taper of prednisone or medication for nausea.  Please keep me posted  Please let me know which migraine medication has been effective  You may continue using ibuprofen as needed  Please schedule brain MRI  Please proceed with x-ray of neck and thoracic spine, walk in  Please schedule physical therapy for treatment of neck pain and headaches  If physical therapy is not providing relief in 4 to 6 weeks-please contact me and we will consider MRI of cervical spine  Left leg ultrasound  Try muscle relaxant that can help neck and leg pain
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
## 2025-02-28 PROBLEM — M54.2 CHRONIC NECK PAIN: Status: ACTIVE | Noted: 2025-02-28

## 2025-02-28 PROBLEM — G89.29 CHRONIC NECK PAIN: Status: ACTIVE | Noted: 2025-02-28

## 2025-02-28 PROBLEM — M79.605 PAIN OF LEFT LOWER EXTREMITY: Status: ACTIVE | Noted: 2025-02-28

## 2025-02-28 PROBLEM — Z87.81: Status: ACTIVE | Noted: 2025-02-28

## 2025-02-28 PROBLEM — G44.52 NEW PERSISTENT DAILY HEADACHE: Status: ACTIVE | Noted: 2025-02-28

## 2025-02-28 NOTE — ASSESSMENT & PLAN NOTE
Persistent cervicogenic/migraine headaches, new onset, increased frequency and severity within past few months.  Proceed with brain MRI.  Referral to physical  Orders:  •  MRI brain wo contrast; Future  •  Ambulatory referral to Physical Therapy; Future

## 2025-02-28 NOTE — ASSESSMENT & PLAN NOTE
Patient presents with intermittent sharp pain in left mid hamstring.  No known injury or fall.  Possible strain.  Proceed with venous duplex to rule out VTE.  Orders:  •  VAS VENOUS DUPLEX -LOWER LIMB UNILATERAL; Future  •  tiZANidine (ZANAFLEX) 4 mg tablet; Take 1 tablet (4 mg total) by mouth daily at bedtime as needed for muscle spasms (neck pain)
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
## 2025-04-03 ENCOUNTER — OFFICE VISIT (OUTPATIENT)
Dept: FAMILY MEDICINE CLINIC | Facility: CLINIC | Age: 60
End: 2025-04-03
Payer: COMMERCIAL

## 2025-04-03 VITALS
TEMPERATURE: 97.6 F | DIASTOLIC BLOOD PRESSURE: 86 MMHG | HEART RATE: 75 BPM | HEIGHT: 61 IN | SYSTOLIC BLOOD PRESSURE: 120 MMHG | OXYGEN SATURATION: 97 % | RESPIRATION RATE: 16 BRPM | BODY MASS INDEX: 24.07 KG/M2

## 2025-04-03 DIAGNOSIS — J06.9 VIRAL UPPER RESPIRATORY TRACT INFECTION: Primary | ICD-10-CM

## 2025-04-03 LAB
SARS-COV-2 AG UPPER RESP QL IA: NEGATIVE
VALID CONTROL: NORMAL

## 2025-04-03 PROCEDURE — 99213 OFFICE O/P EST LOW 20 MIN: CPT | Performed by: FAMILY MEDICINE

## 2025-04-03 PROCEDURE — 87811 SARS-COV-2 COVID19 W/OPTIC: CPT | Performed by: FAMILY MEDICINE

## 2025-04-03 RX ORDER — AMOXICILLIN 875 MG/1
875 TABLET, COATED ORAL 2 TIMES DAILY
Qty: 14 TABLET | Refills: 0 | Status: SHIPPED | OUTPATIENT
Start: 2025-04-03 | End: 2025-04-10

## 2025-04-03 NOTE — PROGRESS NOTES
Name: Gaby Bowling      : 1965      MRN: [id number]  Encounter Provider: Cathy Khan MD  Encounter Date: 4/3/2025   Encounter department: [facility]    Assessment & Plan  Viral upper respiratory tract infection  URI symptoms x 2 days  COVID test is negative  Patient is afebrile, denies sore throat, chest tightness or wheezing  Presents with symptoms of postnasal drip, rhinorrhea and occasional cough  Advised supportive care with antihistamine and PRN cough med OTC (fexofenadine and Delsym)  If symptoms worsen, if she develops fever that she will start amoxicillin, Rx provided to keep on hand.  Orders:  •  POCT Rapid Covid Ag  •  amoxicillin (AMOXIL) 875 mg tablet; Take 1 tablet (875 mg total) by mouth 2 (two) times a day for 7 days       Patient Instructions   Please start  Allegra ( fexofenadine) 180mg  once a day nondrowsy allergy medication  If symptoms worsen at any time - start amoxicillin for 7 days.    You may use Delsym for cough if needed  Take Tylenol or Advil only if you have pain no fever    History of Present Illness      URI symptoms x 2 days  Complains of sneezing, rhinorrhea, mild cough, postnasal drip, occasional clearing of her throat.  Watery eyes.  Patient denies symptoms of sore throat, chest tightness, shortness of breath or wheezing.  She has been using ibuprofen.  Patient is traveling to [facility] to attend someone in the hospital and is worried about getting sicker.  She would like to be proactive and avoid development of bronchitis    No known sick contacts    URI   Associated symptoms include congestion and rhinorrhea. Pertinent negatives include no sore throat.     Review of Systems   Constitutional:  Negative for fatigue and fever.   HENT:  Positive for congestion, postnasal drip and rhinorrhea. Negative for sore throat.    Eyes:         Watery eyes   Respiratory: Negative.     Cardiovascular: Negative.    Neurological: Negative.      Past Medical  "History:   Diagnosis Date   • Headache    • Pyelonephritis 3/25/2022     Past Surgical History:   Procedure Laterality Date   • BREAST CYST EXCISION Right     benign   • SKIN CANCER EXCISION      8/22/24   • TRIGGER FINGER RELEASE Right 02/13/2023     Family History   Problem Relation Age of Onset   • Seizures Sister    • Lupus Sister      Social History     Tobacco Use   • Smoking status: Every Day     Current packs/day: 0.25     Average packs/day: 0.3 packs/day for 25.0 years (6.3 ttl pk-yrs)     Types: Cigarettes   • Smokeless tobacco: Never   Vaping Use   • Vaping status: Never Used   Substance and Sexual Activity   • Alcohol use: Yes     Comment: occasionally   • Drug use: No   • Sexual activity: Not on file     Current Outpatient Medications on File Prior to Visit   Medication Sig   • Ammonium Lactate 10 % CREA Apply daily   • PARoxetine (PAXIL-CR) 25 mg 24 hr tablet Take 1 tablet (25 mg total) by mouth every morning   • tiZANidine (ZANAFLEX) 4 mg tablet Take 1 tablet (4 mg total) by mouth daily at bedtime as needed for muscle spasms (neck pain)   • tretinoin (RETIN-A) 0.05 % cream Apply topically daily at bedtime   • Ciclopirox 0.77 % gel  (Patient not taking: Reported on 2/24/2025)     Allergies   Allergen Reactions   • Azithromycin Nausea Only   • Iodine - Food Allergy Swelling and Edema     Reaction Date: 14Sep2011;    • Zoloft [Sertraline]      Worsening of anxiety     Immunization History   Administered Date(s) Administered   • INFLUENZA 11/01/2018   • Influenza, recombinant, quadrivalent,injectable, preservative free 12/10/2019   • Influenza, seasonal, injectable, preservative free 11/01/2018     Objective   /86 (BP Location: Right arm, Patient Position: Sitting, Cuff Size: Standard)   Pulse 75   Temp 97.6 °F (36.4 °C) (Temporal)   Resp 16   Ht 5' 1\" (1.549 m)   SpO2 97%   BMI 24.07 kg/m²     Physical Exam  Vitals and nursing note reviewed.   Constitutional:       General: She is not in " acute distress.     Appearance: Normal appearance. She is well-developed. She is not ill-appearing.   HENT:      Head: Normocephalic and atraumatic.      Right Ear: Tympanic membrane normal.      Left Ear: Tympanic membrane normal.      Nose: Mucosal edema and congestion present.      Mouth/Throat:      Mouth: Mucous membranes are moist.      Pharynx: No posterior oropharyngeal erythema.   Eyes:      General: No scleral icterus.     Conjunctiva/sclera: Conjunctivae normal.   Cardiovascular:      Rate and Rhythm: Normal rate and regular rhythm.      Heart sounds: Normal heart sounds. No murmur heard.  Pulmonary:      Effort: Pulmonary effort is normal. No respiratory distress.      Breath sounds: Normal breath sounds. No wheezing or rhonchi.   Musculoskeletal:      Cervical back: Neck supple.   Lymphadenopathy:      Cervical: No cervical adenopathy.   Neurological:      Mental Status: She is alert and oriented to person, place, and time.      Cranial Nerves: No cranial nerve deficit.      Deep Tendon Reflexes: Reflexes are normal and symmetric.   Psychiatric:         Mood and Affect: Mood normal.         Behavior: Behavior normal.         Thought Content: Thought content normal.       Results for orders placed or performed in visit on 04/03/25   POCT Rapid Covid Ag   Result Value Ref Range    POCT SARS-CoV-2 Ag Negative Negative    VALID CONTROL Valid

## 2025-04-03 NOTE — PATIENT INSTRUCTIONS
Please start  Allegra ( fexofenadine) 180mg  once a day nondrowsy allergy medication  If symptoms worsen at any time - start amoxicillin for 7 days.    You may use Delsym for cough if needed  Take Tylenol or Advil only if you have pain no fever

## 2025-07-01 NOTE — PROGRESS NOTES
Name: Timmy Nichole      : 1965      MRN: [id number]  Encounter Provider: Josseline Gibbs MD  Encounter Date: 5/15/2023   Encounter department: [address] [place] [address]      1  Cold sore  -     cephalexin (KEFLEX) 500 mg capsule; Take 1 capsule (500 mg total) by mouth 3 (three) times a day for 7 days  Patient presents for evaluation of cold sore/HSV infection  Her symptoms started 5 days ago, no rationale for antiviral therapy  Patient is concerned about localized tenderness and enlarging area of erythema  I will start her on Keflex to cover possible secondary bacterial infection  Patient will contact me in the few days if symptoms are not improving  Subjective     Enlarginfg pimple/ redness right lateral chin  Symptoms started on Wednesday, 5 days ago   No excessive time spent outside, no known tick or spider bites  Patient is afebrile  She is concerned that chin irritation is not improving  No other rashes  She is afebrile    Insect Bite      Review of Systems   Constitutional: Negative  HENT: Negative  Respiratory: Negative  Cardiovascular: Negative      Skin:        As per HPI       Past Medical History:   Diagnosis Date   • Headache    • Pyelonephritis 3/25/2022     Past Surgical History:   Procedure Laterality Date   • BREAST CYST EXCISION Right     benign   • TRIGGER FINGER RELEASE Right 2023     Family History   Problem Relation Age of Onset   • Seizures Sister    • Lupus Sister      Social History     Socioeconomic History   • Marital status: Legally      Spouse name: None   • Number of children: None   • Years of education: None   • Highest education level: None   Occupational History     Employer: [employer]   Tobacco Use   • Smoking status: Every Day     Packs/day: 0 25     Years: 25 00     Pack years: 6 25     Types: Cigarettes   • Smokeless tobacco: Never   Vaping Use   • Vaping Use: Never used   Substance and Sexual Activity   • "Alcohol use: Yes     Comment: occasionally   • Drug use: No   • Sexual activity: None   Other Topics Concern   • None   Social History Narrative   • None     Social Determinants of Health     Financial Resource Strain: Not on file   Food Insecurity: Not on file   Transportation Needs: Not on file   Physical Activity: Not on file   Stress: Not on file   Social Connections: Not on file   Intimate Partner Violence: Not on file   Housing Stability: Not on file     Current Outpatient Medications on File Prior to Visit   Medication Sig   • PARoxetine (PAXIL-CR) 25 mg 24 hr tablet TAKE 1 TABLET BY MOUTH EVERY MORNING   • Ciclopirox 0 77 % gel      Allergies   Allergen Reactions   • Azithromycin Nausea Only   • Iodine - Food Allergy Swelling and Edema     Reaction Date: 14Sep2011;    • Zoloft [Sertraline]      Worsening of anxiety     Immunization History   Administered Date(s) Administered   • INFLUENZA 11/01/2018   • Influenza, recombinant, quadrivalent,injectable, preservative free 12/10/2019   • Influenza, seasonal, injectable, preservative free 11/01/2018       Objective     /66 (BP Location: Left arm, Patient Position: Sitting, Cuff Size: Standard)   Pulse 79   Temp 98 °F (36 7 °C) (Temporal)   Resp 16   Ht 5' 1\" (1 549 m)   SpO2 97%   BMI 23 28 kg/m²     Physical Exam  Vitals reviewed  Constitutional:       Appearance: Normal appearance  Skin:     Comments: Right lateral chin: Localized erythema consistent with HSV infection, some crusting, total area of 7 to 8 mm, tender to touch   Neurological:      General: No focal deficit present  Mental Status: She is alert and oriented to person, place, and time     Psychiatric:         Mood and Affect: Mood normal          Behavior: Behavior normal        Devoria MD [place]  " Yes

## 2025-08-12 ENCOUNTER — OFFICE VISIT (OUTPATIENT)
Dept: FAMILY MEDICINE CLINIC | Facility: CLINIC | Age: 60
End: 2025-08-12
Payer: COMMERCIAL

## 2025-08-14 ENCOUNTER — TELEPHONE (OUTPATIENT)
Age: 60
End: 2025-08-14

## 2025-08-20 ENCOUNTER — PATIENT MESSAGE (OUTPATIENT)
Dept: FAMILY MEDICINE CLINIC | Facility: CLINIC | Age: 60
End: 2025-08-20

## 2025-08-21 ENCOUNTER — OFFICE VISIT (OUTPATIENT)
Dept: FAMILY MEDICINE CLINIC | Facility: CLINIC | Age: 60
End: 2025-08-21
Payer: COMMERCIAL

## 2025-08-21 VITALS
DIASTOLIC BLOOD PRESSURE: 60 MMHG | SYSTOLIC BLOOD PRESSURE: 120 MMHG | HEIGHT: 61 IN | OXYGEN SATURATION: 94 % | TEMPERATURE: 98 F | RESPIRATION RATE: 16 BRPM | WEIGHT: 124 LBS | BODY MASS INDEX: 23.41 KG/M2 | HEART RATE: 71 BPM

## 2025-08-21 DIAGNOSIS — E78.5 DYSLIPIDEMIA: ICD-10-CM

## 2025-08-21 DIAGNOSIS — E55.9 VITAMIN D DEFICIENCY: ICD-10-CM

## 2025-08-21 DIAGNOSIS — R10.13 EPIGASTRIC PAIN: Primary | ICD-10-CM

## 2025-08-21 PROCEDURE — 99213 OFFICE O/P EST LOW 20 MIN: CPT | Performed by: FAMILY MEDICINE

## 2025-08-21 RX ORDER — PANTOPRAZOLE SODIUM 40 MG/1
40 TABLET, DELAYED RELEASE ORAL DAILY
Qty: 30 TABLET | Refills: 1 | Status: SHIPPED | OUTPATIENT
Start: 2025-08-21